# Patient Record
Sex: MALE | Race: WHITE | NOT HISPANIC OR LATINO | Employment: FULL TIME | URBAN - NONMETROPOLITAN AREA
[De-identification: names, ages, dates, MRNs, and addresses within clinical notes are randomized per-mention and may not be internally consistent; named-entity substitution may affect disease eponyms.]

---

## 2023-04-20 ENCOUNTER — OFFICE VISIT (OUTPATIENT)
Dept: PRIMARY CARE | Facility: CLINIC | Age: 63
End: 2023-04-20
Payer: COMMERCIAL

## 2023-04-20 VITALS
SYSTOLIC BLOOD PRESSURE: 116 MMHG | TEMPERATURE: 97.2 F | DIASTOLIC BLOOD PRESSURE: 64 MMHG | HEIGHT: 69 IN | BODY MASS INDEX: 21.62 KG/M2 | OXYGEN SATURATION: 99 % | RESPIRATION RATE: 18 BRPM | WEIGHT: 146 LBS | HEART RATE: 89 BPM

## 2023-04-20 DIAGNOSIS — I73.9 PAD (PERIPHERAL ARTERY DISEASE) (CMS-HCC): ICD-10-CM

## 2023-04-20 DIAGNOSIS — I25.10 CORONARY ARTERY DISEASE INVOLVING NATIVE HEART, UNSPECIFIED VESSEL OR LESION TYPE, UNSPECIFIED WHETHER ANGINA PRESENT: ICD-10-CM

## 2023-04-20 DIAGNOSIS — J44.9 CHRONIC OBSTRUCTIVE PULMONARY DISEASE, UNSPECIFIED COPD TYPE (MULTI): Primary | ICD-10-CM

## 2023-04-20 DIAGNOSIS — D50.9 IRON DEFICIENCY ANEMIA, UNSPECIFIED IRON DEFICIENCY ANEMIA TYPE: ICD-10-CM

## 2023-04-20 DIAGNOSIS — I15.9 SECONDARY HYPERTENSION: ICD-10-CM

## 2023-04-20 PROBLEM — I10 HYPERTENSION: Status: ACTIVE | Noted: 2023-04-20

## 2023-04-20 PROCEDURE — 99213 OFFICE O/P EST LOW 20 MIN: CPT | Performed by: NURSE PRACTITIONER

## 2023-04-20 PROCEDURE — 3074F SYST BP LT 130 MM HG: CPT | Performed by: NURSE PRACTITIONER

## 2023-04-20 PROCEDURE — 83550 IRON BINDING TEST: CPT

## 2023-04-20 PROCEDURE — 36415 COLL VENOUS BLD VENIPUNCTURE: CPT | Performed by: NURSE PRACTITIONER

## 2023-04-20 PROCEDURE — 83540 ASSAY OF IRON: CPT

## 2023-04-20 PROCEDURE — 3078F DIAST BP <80 MM HG: CPT | Performed by: NURSE PRACTITIONER

## 2023-04-20 RX ORDER — AMLODIPINE BESYLATE 10 MG/1
10 TABLET ORAL DAILY
Qty: 30 TABLET | Refills: 1 | Status: SHIPPED | OUTPATIENT
Start: 2023-04-20 | End: 2023-06-19 | Stop reason: SDUPTHER

## 2023-04-20 RX ORDER — CLOPIDOGREL BISULFATE 75 MG/1
75 TABLET ORAL DAILY
COMMUNITY
End: 2023-08-25 | Stop reason: ALTCHOICE

## 2023-04-20 RX ORDER — AMLODIPINE BESYLATE 10 MG/1
10 TABLET ORAL DAILY
COMMUNITY
End: 2023-04-20 | Stop reason: SDUPTHER

## 2023-04-20 RX ORDER — FLUTICASONE PROPIONATE AND SALMETEROL 250; 50 UG/1; UG/1
1 POWDER RESPIRATORY (INHALATION)
Qty: 1 EACH | Refills: 1 | Status: SHIPPED | OUTPATIENT
Start: 2023-04-20 | End: 2023-06-19 | Stop reason: SDUPTHER

## 2023-04-20 RX ORDER — ATORVASTATIN CALCIUM 40 MG/1
40 TABLET, FILM COATED ORAL DAILY
Qty: 30 TABLET | Refills: 1 | Status: SHIPPED | OUTPATIENT
Start: 2023-04-20 | End: 2023-06-19 | Stop reason: SDUPTHER

## 2023-04-20 RX ORDER — ATORVASTATIN CALCIUM 40 MG/1
40 TABLET, FILM COATED ORAL DAILY
COMMUNITY
End: 2023-04-20 | Stop reason: SDUPTHER

## 2023-04-20 RX ORDER — MULTIVITAMIN/IRON/FOLIC ACID 18MG-0.4MG
1 TABLET ORAL DAILY
COMMUNITY

## 2023-04-20 RX ORDER — FLUTICASONE PROPIONATE AND SALMETEROL 250; 50 UG/1; UG/1
POWDER RESPIRATORY (INHALATION)
COMMUNITY
Start: 2021-02-04 | End: 2023-04-20 | Stop reason: SDUPTHER

## 2023-04-20 RX ORDER — LISINOPRIL AND HYDROCHLOROTHIAZIDE 12.5; 2 MG/1; MG/1
1 TABLET ORAL DAILY
Qty: 30 TABLET | Refills: 1 | Status: SHIPPED | OUTPATIENT
Start: 2023-04-20 | End: 2023-06-19 | Stop reason: SDUPTHER

## 2023-04-20 RX ORDER — LISINOPRIL AND HYDROCHLOROTHIAZIDE 12.5; 2 MG/1; MG/1
1 TABLET ORAL DAILY
COMMUNITY
End: 2023-04-20 | Stop reason: SDUPTHER

## 2023-04-20 RX ORDER — ALBUTEROL SULFATE 90 UG/1
1 AEROSOL, METERED RESPIRATORY (INHALATION) EVERY 4 HOURS PRN
COMMUNITY
End: 2023-06-19 | Stop reason: SDUPTHER

## 2023-04-20 NOTE — PROGRESS NOTES
Subjective   Iam Acevedo is a 63 y.o. male who presents for Follow-up (6month ).  Sees Vascular q 6 mos sched next month. Still on Plavix  HTN- on multiple meds, no recent adjustments  COPD- on albuterol with rare use. And Advair- need refill. NO exacerbations or recent illness. At baseline  PAD- on plavix. Bruises easily  Nicotene-  less than 1 PPD x 50 years. Trying to quit  ETOH-  approx 1 case beer.   Exercise- none. Work FT. Very active- lifting, painting            The ROS have been reviewed otherwise negative except for what is in the HPI    Objective   Physical Exam  Constitutional:       Appearance: He is obese.   Cardiovascular:      Rate and Rhythm: Normal rate and regular rhythm.   Pulmonary:      Effort: Pulmonary effort is normal.      Breath sounds: Normal breath sounds.   Abdominal:      General: Bowel sounds are normal.      Palpations: Abdomen is soft.   Musculoskeletal:         General: Normal range of motion.   Neurological:      Mental Status: He is alert and oriented to person, place, and time.   Psychiatric:         Mood and Affect: Mood normal.         Behavior: Behavior normal.       Visit Vitals  /64   Pulse 89   Temp 36.2 °C (97.2 °F)   Resp 18        Assessment/Plan   Problem List Items Addressed This Visit    None    Iam was seen today for follow-up.  Diagnoses and all orders for this visit:  Chronic obstructive pulmonary disease, unspecified COPD type (CMS/HCC) (Primary)  Comments:  stable. refill inhalers  Orders:  -     fluticasone propion-salmeteroL (Advair Diskus) 250-50 mcg/dose diskus inhaler; Inhale 1 puff  in the morning and 1 puff in the evening.  Coronary artery disease involving native heart, unspecified vessel or lesion type, unspecified whether angina present  Comments:  stable on meds  Orders:  -     atorvastatin (Lipitor) 40 mg tablet; Take 1 tablet (40 mg) by mouth once daily.  Secondary hypertension  Comments:  very well controlled on current regimen.  No chnage. refills sent  Orders:  -     amLODIPine (Norvasc) 10 mg tablet; Take 1 tablet (10 mg) by mouth once daily. as directed  -     lisinopriL-hydrochlorothiazide 20-12.5 mg tablet; Take 1 tablet by mouth once daily.  PAD (peripheral artery disease) (CMS/HCC)  Comments:  managed by cv. on AC  Orders:  -     atorvastatin (Lipitor) 40 mg tablet; Take 1 tablet (40 mg) by mouth once daily.  Iron deficiency anemia, unspecified iron deficiency anemia type  Comments:  on supplement. update levels  Orders:  -     Iron and TIBC

## 2023-04-21 LAB
IRON (UG/DL) IN SER/PLAS: 57 UG/DL (ref 35–150)
IRON BINDING CAPACITY (UG/DL) IN SER/PLAS: 286 UG/DL (ref 240–445)
IRON SATURATION (%) IN SER/PLAS: 20 % (ref 25–45)

## 2023-06-19 DIAGNOSIS — I15.9 SECONDARY HYPERTENSION: ICD-10-CM

## 2023-06-19 DIAGNOSIS — I73.9 PAD (PERIPHERAL ARTERY DISEASE) (CMS-HCC): ICD-10-CM

## 2023-06-19 DIAGNOSIS — J44.9 CHRONIC OBSTRUCTIVE PULMONARY DISEASE, UNSPECIFIED COPD TYPE (MULTI): ICD-10-CM

## 2023-06-19 DIAGNOSIS — I25.10 CORONARY ARTERY DISEASE INVOLVING NATIVE HEART, UNSPECIFIED VESSEL OR LESION TYPE, UNSPECIFIED WHETHER ANGINA PRESENT: ICD-10-CM

## 2023-06-19 RX ORDER — AMLODIPINE BESYLATE 10 MG/1
10 TABLET ORAL DAILY
Qty: 30 TABLET | Refills: 1 | Status: SHIPPED | OUTPATIENT
Start: 2023-06-19 | End: 2023-08-25 | Stop reason: SDUPTHER

## 2023-06-19 RX ORDER — ATORVASTATIN CALCIUM 40 MG/1
40 TABLET, FILM COATED ORAL DAILY
Qty: 30 TABLET | Refills: 1 | Status: SHIPPED | OUTPATIENT
Start: 2023-06-19 | End: 2023-08-25 | Stop reason: SDUPTHER

## 2023-06-19 RX ORDER — FLUTICASONE PROPIONATE AND SALMETEROL 250; 50 UG/1; UG/1
1 POWDER RESPIRATORY (INHALATION)
Qty: 1 EACH | Refills: 1 | Status: SHIPPED | OUTPATIENT
Start: 2023-06-19 | End: 2023-08-25 | Stop reason: SDUPTHER

## 2023-06-19 RX ORDER — LISINOPRIL AND HYDROCHLOROTHIAZIDE 12.5; 2 MG/1; MG/1
1 TABLET ORAL DAILY
Qty: 30 TABLET | Refills: 1 | Status: SHIPPED | OUTPATIENT
Start: 2023-06-19 | End: 2023-08-25 | Stop reason: SDUPTHER

## 2023-06-19 RX ORDER — ALBUTEROL SULFATE 90 UG/1
1 AEROSOL, METERED RESPIRATORY (INHALATION) EVERY 4 HOURS PRN
Qty: 18 G | Refills: 1 | Status: SHIPPED | OUTPATIENT
Start: 2023-06-19 | End: 2024-03-27 | Stop reason: SDUPTHER

## 2023-06-19 NOTE — TELEPHONE ENCOUNTER
Recent PHQ 2/9 Score - 0    PHQ 2:  Date Adult PHQ 2 Score Adult PHQ 2 Interpretation   2/2/2022 0 No further screening needed       PHQ 9:     Most Recent CAM[place] 7 Score - 0             SABINA sent

## 2023-08-25 ENCOUNTER — OFFICE VISIT (OUTPATIENT)
Dept: PRIMARY CARE | Facility: CLINIC | Age: 63
End: 2023-08-25
Payer: COMMERCIAL

## 2023-08-25 VITALS
TEMPERATURE: 97.3 F | WEIGHT: 143 LBS | SYSTOLIC BLOOD PRESSURE: 120 MMHG | HEIGHT: 67 IN | DIASTOLIC BLOOD PRESSURE: 80 MMHG | HEART RATE: 96 BPM | BODY MASS INDEX: 22.44 KG/M2 | OXYGEN SATURATION: 98 %

## 2023-08-25 DIAGNOSIS — I73.9 PAD (PERIPHERAL ARTERY DISEASE) (CMS-HCC): ICD-10-CM

## 2023-08-25 DIAGNOSIS — I25.10 CORONARY ARTERY DISEASE INVOLVING NATIVE HEART, UNSPECIFIED VESSEL OR LESION TYPE, UNSPECIFIED WHETHER ANGINA PRESENT: ICD-10-CM

## 2023-08-25 DIAGNOSIS — I15.9 SECONDARY HYPERTENSION: ICD-10-CM

## 2023-08-25 DIAGNOSIS — J44.9 CHRONIC OBSTRUCTIVE PULMONARY DISEASE, UNSPECIFIED COPD TYPE (MULTI): ICD-10-CM

## 2023-08-25 PROCEDURE — 3079F DIAST BP 80-89 MM HG: CPT

## 2023-08-25 PROCEDURE — 3074F SYST BP LT 130 MM HG: CPT

## 2023-08-25 PROCEDURE — 99396 PREV VISIT EST AGE 40-64: CPT

## 2023-08-25 RX ORDER — LISINOPRIL AND HYDROCHLOROTHIAZIDE 12.5; 2 MG/1; MG/1
1 TABLET ORAL DAILY
Qty: 90 TABLET | Refills: 1 | Status: SHIPPED | OUTPATIENT
Start: 2023-08-25 | End: 2024-03-27 | Stop reason: SDUPTHER

## 2023-08-25 RX ORDER — AMLODIPINE BESYLATE 10 MG/1
10 TABLET ORAL DAILY
Qty: 90 TABLET | Refills: 1 | Status: SHIPPED | OUTPATIENT
Start: 2023-08-25 | End: 2024-03-27 | Stop reason: SDUPTHER

## 2023-08-25 RX ORDER — FERROUS SULFATE 325(65) MG
65 TABLET, DELAYED RELEASE (ENTERIC COATED) ORAL
COMMUNITY

## 2023-08-25 RX ORDER — ATORVASTATIN CALCIUM 40 MG/1
40 TABLET, FILM COATED ORAL DAILY
Qty: 90 TABLET | Refills: 1 | Status: SHIPPED | OUTPATIENT
Start: 2023-08-25 | End: 2024-03-27 | Stop reason: SDUPTHER

## 2023-08-25 RX ORDER — FLUTICASONE PROPIONATE AND SALMETEROL 250; 50 UG/1; UG/1
1 POWDER RESPIRATORY (INHALATION)
Qty: 90 EACH | Refills: 1 | Status: SHIPPED | OUTPATIENT
Start: 2023-08-25 | End: 2023-11-23

## 2023-08-25 ASSESSMENT — ENCOUNTER SYMPTOMS
UNEXPECTED WEIGHT CHANGE: 0
ABDOMINAL PAIN: 0
DEPRESSION: 0
ALLERGIC/IMMUNOLOGIC NEGATIVE: 1
FEVER: 0
MUSCULOSKELETAL NEGATIVE: 1
LOSS OF SENSATION IN FEET: 0
ACTIVITY CHANGE: 0
CARDIOVASCULAR NEGATIVE: 1
ENDOCRINE NEGATIVE: 1
SHORTNESS OF BREATH: 0
FATIGUE: 0
WEAKNESS: 0
RESPIRATORY NEGATIVE: 1
DIZZINESS: 0
GASTROINTESTINAL NEGATIVE: 1
OCCASIONAL FEELINGS OF UNSTEADINESS: 0
HEADACHES: 0
PSYCHIATRIC NEGATIVE: 1

## 2023-08-25 ASSESSMENT — PATIENT HEALTH QUESTIONNAIRE - PHQ9
1. LITTLE INTEREST OR PLEASURE IN DOING THINGS: NOT AT ALL
SUM OF ALL RESPONSES TO PHQ9 QUESTIONS 1 & 2: 0
2. FEELING DOWN, DEPRESSED OR HOPELESS: NOT AT ALL

## 2023-08-25 NOTE — PROGRESS NOTES
Subjective   Patient ID: Iam Acevedo is a 63 y.o. male who presents for Establish Care (Iam is being seen today to establish care. Needs refills. ).  Stent placed last fall - PAD    Seeing vasc surg once yearly  Now off plavix since May    He does drink alcohol, admits to about a case per week (30 pack)  Chronic anemia, appears to be macrocytic. - needs labs in October, recheck vitamins B and CBC as well as Folate.     COPD good control, Advair twice daily, rarely using albuterol.         Vitals:    08/25/23 1519   BP: 120/80   Pulse: 96   Temp: 36.3 °C (97.3 °F)   SpO2: 98%       Review of Systems   Constitutional:  Negative for activity change, fatigue, fever and unexpected weight change.   HENT: Negative.     Respiratory: Negative.  Negative for shortness of breath.    Cardiovascular: Negative.  Negative for chest pain.   Gastrointestinal: Negative.  Negative for abdominal pain.   Endocrine: Negative.    Musculoskeletal: Negative.    Skin: Negative.    Allergic/Immunologic: Negative.    Neurological:  Negative for dizziness, weakness and headaches.   Psychiatric/Behavioral: Negative.         Objective   Physical Exam  Vitals and nursing note reviewed.   Constitutional:       Appearance: Normal appearance.   HENT:      Head: Normocephalic.      Mouth/Throat:      Mouth: Mucous membranes are moist.   Cardiovascular:      Rate and Rhythm: Normal rate and regular rhythm.      Pulses: Normal pulses.      Heart sounds: Normal heart sounds. No murmur heard.     No friction rub. No gallop.   Pulmonary:      Effort: Pulmonary effort is normal. No respiratory distress.      Breath sounds: Normal breath sounds. No wheezing.   Abdominal:      General: Bowel sounds are normal. There is no distension.      Palpations: Abdomen is soft.      Tenderness: There is no abdominal tenderness.   Musculoskeletal:         General: No deformity. Normal range of motion.   Skin:     General: Skin is warm and dry.      Capillary  Refill: Capillary refill takes less than 2 seconds.   Neurological:      General: No focal deficit present.      Mental Status: He is alert and oriented to person, place, and time.   Psychiatric:         Mood and Affect: Mood normal.         Assessment/Plan   Problem List Items Addressed This Visit       CAD (coronary artery disease)    Relevant Medications    amLODIPine (Norvasc) 10 mg tablet    atorvastatin (Lipitor) 40 mg tablet    Other Relevant Orders    Vitamin B12    CBC and Auto Differential    Comprehensive Metabolic Panel    Lipid Panel    Vitamin D 25-Hydroxy,Total    Iron and TIBC    Ferritin    Folate    COPD (chronic obstructive pulmonary disease) (CMS/[facility])    Relevant Medications    fluticasone propion-salmeteroL (Advair Diskus) 250-50 mcg/dose diskus inhaler    Hypertension    Relevant Medications    amLODIPine (Norvasc) 10 mg tablet    lisinopriL-hydrochlorothiazide 20-12.5 mg tablet    PAD (peripheral artery disease) (CMS/[facility])    Relevant Medications    atorvastatin (Lipitor) 40 mg tablet            Thank you for coming in today, please call my office if you have any concerns or questions.     Ángel BONNER, CNP

## 2023-08-25 NOTE — PATIENT INSTRUCTIONS
Refills sent  Labs in 2 months with JEROME Robin    Thank you for coming in today, if any questions or concerns arise, please call my office.   Ángel Mccarty, KAILEY-CNP

## 2023-10-23 ENCOUNTER — APPOINTMENT (OUTPATIENT)
Dept: PRIMARY CARE | Facility: CLINIC | Age: 63
End: 2023-10-23
Payer: COMMERCIAL

## 2023-10-27 ENCOUNTER — CLINICAL SUPPORT (OUTPATIENT)
Dept: PRIMARY CARE | Facility: CLINIC | Age: 63
End: 2023-10-27
Payer: COMMERCIAL

## 2023-10-27 DIAGNOSIS — I25.10 CORONARY ARTERY DISEASE INVOLVING NATIVE HEART, UNSPECIFIED VESSEL OR LESION TYPE, UNSPECIFIED WHETHER ANGINA PRESENT: ICD-10-CM

## 2023-10-27 PROCEDURE — 82306 VITAMIN D 25 HYDROXY: CPT

## 2023-10-27 PROCEDURE — 83550 IRON BINDING TEST: CPT

## 2023-10-27 PROCEDURE — 80061 LIPID PANEL: CPT

## 2023-10-27 PROCEDURE — 36415 COLL VENOUS BLD VENIPUNCTURE: CPT

## 2023-10-27 PROCEDURE — 80053 COMPREHEN METABOLIC PANEL: CPT

## 2023-10-27 PROCEDURE — 82728 ASSAY OF FERRITIN: CPT

## 2023-10-27 PROCEDURE — 82746 ASSAY OF FOLIC ACID SERUM: CPT

## 2023-10-27 PROCEDURE — 82607 VITAMIN B-12: CPT

## 2023-10-27 PROCEDURE — 83540 ASSAY OF IRON: CPT

## 2023-10-27 PROCEDURE — 85025 COMPLETE CBC W/AUTO DIFF WBC: CPT

## 2023-10-28 LAB
25(OH)D3 SERPL-MCNC: 25 NG/ML (ref 30–100)
ALBUMIN SERPL BCP-MCNC: 4.2 G/DL (ref 3.4–5)
ALP SERPL-CCNC: 72 U/L (ref 33–136)
ALT SERPL W P-5'-P-CCNC: 22 U/L (ref 10–52)
ANION GAP SERPL CALC-SCNC: 19 MMOL/L (ref 10–20)
AST SERPL W P-5'-P-CCNC: 20 U/L (ref 9–39)
BASOPHILS # BLD AUTO: 0.03 X10*3/UL (ref 0–0.1)
BASOPHILS NFR BLD AUTO: 0.5 %
BILIRUB SERPL-MCNC: 0.8 MG/DL (ref 0–1.2)
BUN SERPL-MCNC: 16 MG/DL (ref 6–23)
CALCIUM SERPL-MCNC: 9.5 MG/DL (ref 8.6–10.6)
CHLORIDE SERPL-SCNC: 100 MMOL/L (ref 98–107)
CHOLEST SERPL-MCNC: 141 MG/DL (ref 0–199)
CHOLESTEROL/HDL RATIO: 1.6
CO2 SERPL-SCNC: 23 MMOL/L (ref 21–32)
CREAT SERPL-MCNC: 0.7 MG/DL (ref 0.5–1.3)
EOSINOPHIL # BLD AUTO: 0.12 X10*3/UL (ref 0–0.7)
EOSINOPHIL NFR BLD AUTO: 1.9 %
ERYTHROCYTE [DISTWIDTH] IN BLOOD BY AUTOMATED COUNT: 13 % (ref 11.5–14.5)
FERRITIN SERPL-MCNC: 103 NG/ML (ref 20–300)
FOLATE SERPL-MCNC: 15.6 NG/ML
GFR SERPL CREATININE-BSD FRML MDRD: >90 ML/MIN/1.73M*2
GLUCOSE SERPL-MCNC: 64 MG/DL (ref 74–99)
HCT VFR BLD AUTO: 45.1 % (ref 41–52)
HDLC SERPL-MCNC: 86.8 MG/DL
HGB BLD-MCNC: 14.5 G/DL (ref 13.5–17.5)
IMM GRANULOCYTES # BLD AUTO: 0.01 X10*3/UL (ref 0–0.7)
IMM GRANULOCYTES NFR BLD AUTO: 0.2 % (ref 0–0.9)
IRON SATN MFR SERPL: 65 % (ref 25–45)
IRON SERPL-MCNC: 191 UG/DL (ref 35–150)
LDLC SERPL CALC-MCNC: 44 MG/DL
LYMPHOCYTES # BLD AUTO: 1.73 X10*3/UL (ref 1.2–4.8)
LYMPHOCYTES NFR BLD AUTO: 27.2 %
MCH RBC QN AUTO: 33.6 PG (ref 26–34)
MCHC RBC AUTO-ENTMCNC: 32.2 G/DL (ref 32–36)
MCV RBC AUTO: 104 FL (ref 80–100)
MONOCYTES # BLD AUTO: 0.59 X10*3/UL (ref 0.1–1)
MONOCYTES NFR BLD AUTO: 9.3 %
NEUTROPHILS # BLD AUTO: 3.89 X10*3/UL (ref 1.2–7.7)
NEUTROPHILS NFR BLD AUTO: 60.9 %
NON HDL CHOLESTEROL: 54 MG/DL (ref 0–149)
NRBC BLD-RTO: 0 /100 WBCS (ref 0–0)
PLATELET # BLD AUTO: 307 X10*3/UL (ref 150–450)
PMV BLD AUTO: 9.4 FL (ref 7.5–11.5)
POTASSIUM SERPL-SCNC: 4.8 MMOL/L (ref 3.5–5.3)
PROT SERPL-MCNC: 6.6 G/DL (ref 6.4–8.2)
RBC # BLD AUTO: 4.32 X10*6/UL (ref 4.5–5.9)
SODIUM SERPL-SCNC: 137 MMOL/L (ref 136–145)
TIBC SERPL-MCNC: 296 UG/DL (ref 240–445)
TRIGL SERPL-MCNC: 50 MG/DL (ref 0–149)
UIBC SERPL-MCNC: 105 UG/DL (ref 110–370)
VIT B12 SERPL-MCNC: 368 PG/ML (ref 211–911)
VLDL: 10 MG/DL (ref 0–40)
WBC # BLD AUTO: 6.4 X10*3/UL (ref 4.4–11.3)

## 2023-10-30 ENCOUNTER — TELEPHONE (OUTPATIENT)
Dept: PRIMARY CARE | Facility: CLINIC | Age: 63
End: 2023-10-30
Payer: COMMERCIAL

## 2023-10-30 DIAGNOSIS — E55.9 VITAMIN D DEFICIENCY: Primary | ICD-10-CM

## 2023-10-30 RX ORDER — ERGOCALCIFEROL 1.25 MG/1
50000 CAPSULE ORAL
Qty: 12 CAPSULE | Refills: 0 | Status: SHIPPED | OUTPATIENT
Start: 2023-10-30 | End: 2024-01-22

## 2023-10-30 NOTE — TELEPHONE ENCOUNTER
----- Message from KAILEY Neville-CNP sent at 10/30/2023  1:01 PM EDT -----  Vitamin D is low, started supplement, sent to his pharmacy

## 2023-10-31 NOTE — TELEPHONE ENCOUNTER
Annabella left a message stating they did [facility] Will  medications.

## 2024-03-27 DIAGNOSIS — J44.9 CHRONIC OBSTRUCTIVE PULMONARY DISEASE, UNSPECIFIED COPD TYPE (MULTI): ICD-10-CM

## 2024-03-27 DIAGNOSIS — I15.9 SECONDARY HYPERTENSION: ICD-10-CM

## 2024-03-27 DIAGNOSIS — I73.9 PAD (PERIPHERAL ARTERY DISEASE) (CMS-HCC): ICD-10-CM

## 2024-03-27 DIAGNOSIS — I25.10 CORONARY ARTERY DISEASE INVOLVING NATIVE HEART, UNSPECIFIED VESSEL OR LESION TYPE, UNSPECIFIED WHETHER ANGINA PRESENT: ICD-10-CM

## 2024-03-28 RX ORDER — LISINOPRIL AND HYDROCHLOROTHIAZIDE 12.5; 2 MG/1; MG/1
1 TABLET ORAL DAILY
Qty: 90 TABLET | Refills: 1 | Status: SHIPPED | OUTPATIENT
Start: 2024-03-28 | End: 2024-09-24

## 2024-03-28 RX ORDER — AMLODIPINE BESYLATE 10 MG/1
10 TABLET ORAL DAILY
Qty: 90 TABLET | Refills: 1 | Status: SHIPPED | OUTPATIENT
Start: 2024-03-28

## 2024-03-28 RX ORDER — ALBUTEROL SULFATE 90 UG/1
1 AEROSOL, METERED RESPIRATORY (INHALATION) EVERY 4 HOURS PRN
Qty: 18 G | Refills: 1 | Status: SHIPPED | OUTPATIENT
Start: 2024-03-28

## 2024-03-28 RX ORDER — ATORVASTATIN CALCIUM 40 MG/1
40 TABLET, FILM COATED ORAL DAILY
Qty: 90 TABLET | Refills: 1 | Status: SHIPPED | OUTPATIENT
Start: 2024-03-28 | End: 2024-09-24

## 2024-04-01 ENCOUNTER — APPOINTMENT (OUTPATIENT)
Dept: PRIMARY CARE | Facility: CLINIC | Age: 64
End: 2024-04-01
Payer: COMMERCIAL

## 2024-05-17 ENCOUNTER — APPOINTMENT (OUTPATIENT)
Dept: VASCULAR SURGERY | Facility: HOSPITAL | Age: 64
End: 2024-05-17
Payer: COMMERCIAL

## 2024-05-17 ENCOUNTER — APPOINTMENT (OUTPATIENT)
Dept: VASCULAR MEDICINE | Facility: HOSPITAL | Age: 64
End: 2024-05-17
Payer: COMMERCIAL

## 2024-09-16 ENCOUNTER — APPOINTMENT (OUTPATIENT)
Dept: RADIOLOGY | Facility: HOSPITAL | Age: 64
End: 2024-09-16
Payer: COMMERCIAL

## 2024-09-16 ENCOUNTER — HOSPITAL ENCOUNTER (EMERGENCY)
Facility: HOSPITAL | Age: 64
Discharge: HOME | End: 2024-09-16
Attending: FAMILY MEDICINE
Payer: COMMERCIAL

## 2024-09-16 VITALS
WEIGHT: 145 LBS | TEMPERATURE: 98.4 F | SYSTOLIC BLOOD PRESSURE: 116 MMHG | HEIGHT: 68 IN | OXYGEN SATURATION: 100 % | RESPIRATION RATE: 16 BRPM | HEART RATE: 89 BPM | BODY MASS INDEX: 21.98 KG/M2 | DIASTOLIC BLOOD PRESSURE: 84 MMHG

## 2024-09-16 DIAGNOSIS — M19.032 PRIMARY OSTEOARTHRITIS OF LEFT WRIST: ICD-10-CM

## 2024-09-16 DIAGNOSIS — S63.502A SPRAIN OF LEFT WRIST, INITIAL ENCOUNTER: Primary | ICD-10-CM

## 2024-09-16 DIAGNOSIS — M79.645 THUMB PAIN, LEFT: ICD-10-CM

## 2024-09-16 PROCEDURE — 73110 X-RAY EXAM OF WRIST: CPT | Mod: LT

## 2024-09-16 PROCEDURE — 73110 X-RAY EXAM OF WRIST: CPT | Mod: LEFT SIDE | Performed by: RADIOLOGY

## 2024-09-16 PROCEDURE — 99284 EMERGENCY DEPT VISIT MOD MDM: CPT | Mod: 25

## 2024-09-16 PROCEDURE — 73130 X-RAY EXAM OF HAND: CPT | Mod: LEFT SIDE | Performed by: RADIOLOGY

## 2024-09-16 PROCEDURE — 73130 X-RAY EXAM OF HAND: CPT | Mod: LT

## 2024-09-16 ASSESSMENT — PAIN DESCRIPTION - PAIN TYPE: TYPE: ACUTE PAIN

## 2024-09-16 ASSESSMENT — COLUMBIA-SUICIDE SEVERITY RATING SCALE - C-SSRS
6. HAVE YOU EVER DONE ANYTHING, STARTED TO DO ANYTHING, OR PREPARED TO DO ANYTHING TO END YOUR LIFE?: NO
1. IN THE PAST MONTH, HAVE YOU WISHED YOU WERE DEAD OR WISHED YOU COULD GO TO SLEEP AND NOT WAKE UP?: NO
2. HAVE YOU ACTUALLY HAD ANY THOUGHTS OF KILLING YOURSELF?: NO

## 2024-09-16 ASSESSMENT — PAIN DESCRIPTION - LOCATION: LOCATION: WRIST

## 2024-09-16 ASSESSMENT — PAIN DESCRIPTION - ORIENTATION: ORIENTATION: LEFT

## 2024-09-16 ASSESSMENT — PAIN SCALES - GENERAL: PAINLEVEL_OUTOF10: 4

## 2024-09-16 ASSESSMENT — PAIN - FUNCTIONAL ASSESSMENT: PAIN_FUNCTIONAL_ASSESSMENT: 0-10

## 2024-09-16 NOTE — DISCHARGE INSTRUCTIONS
Tylenol or Motrin for pain and gentle cool compress affected area.  X-ray showed no fracture or dislocation there is some arthritis as well as mechanical sprain to the left wrist causing pain which should improve with the time.  You been referred to your company physician as well as orthopedic service.  If symptoms persist you will need to see a hand specialist.  If worsening symptoms return to ER

## 2024-09-16 NOTE — Clinical Note
Iam Acevedo was seen and treated in our emergency department on 9/16/2024.  He may return to work on 09/16/2024.  Limited use of left hand no more than 5 pound lifting no pushing or pulling with the left hand.  No repetitive type activity left hand     If you have any questions or concerns, please don't hesitate to call.      Juli Babcock MD

## 2024-09-16 NOTE — ED PROVIDER NOTES
HPI   Chief Complaint   Patient presents with    Wrist Injury     Pt presents with c/o pain in L wrist, states he injured it last Friday and it is not feeling better       Left hand and wrist x-ray showed no fracture or dislocation patient however has pain at the base of the        This is a 64-year-old male patient who is left-hand dominant came to the emergency room with a complaint of left wrist and left thumb pain pain is worse when he tried to move his thumb pain is mostly at the base of the first metacarpal and close to the second metacarpal.  Symptoms started when he was using a spray machine which apparently swung around and hit in the dorsum of the left hand at the base of the first metacarpal and side of the second metacarpal area causing pain mostly in the base of the first metacarpal and adjacent area admitted tried to move his arm he cannot fully flex his thumb due to pain in the base of the first metacarpal.  He denies injury to other part of the hand or any other part of body.  Denies any numbness or tingling finger.  He denies any prior history of wrist problems or any surgeries in the past.  He denies any other symptoms.  Denies any fever chills cough chest pain short of breath nausea vomiting or diarrhea.  Denies history of peptic ulcers or renal problem.  Injury occurred at work.    Family history: Reviewed  Social history: Reviewed, denies substance abuse.  Review of system: 10 review of system obtained review of system described in HPI otherwise negative.  Patient History   Past Medical History:   Diagnosis Date    Atherosclerosis of native arteries of extremities with intermittent claudication, left leg (CMS-[facility]) 03/21/2022    Atherosclerosis of native arteries of extremities with intermittent claudication, left leg    Encounter for immunization 09/22/2016    Flu vaccine need    Other acute postprocedural pain 02/08/2021    Acute post-operative pain    Personal history of nicotine dependence  03/22/2022    History of tobacco abuse    Personal history of nicotine dependence 11/08/2018    History of tobacco abuse    Personal history of other diseases of the digestive system 03/11/2021    History of right inguinal hernia    Pneumonia, unspecified organism     Atypical pneumonia    Solitary pulmonary nodule 04/30/2015    Lung nodule    Unspecified abdominal hernia without obstruction or gangrene     Hernia     Past Surgical History:   Procedure Laterality Date    HERNIA REPAIR  03/11/2021    Hernia Repair    OTHER SURGICAL HISTORY  10/05/2020    Vascular surgical procedure     No family history on file.  Social History     Tobacco Use    Smoking status: Every Day     Current packs/day: 1.00     Types: Cigarettes    Smokeless tobacco: Never   Vaping Use    Vaping status: Never Used   Substance Use Topics    Alcohol use: Yes     Alcohol/week: 24.0 standard drinks of alcohol     Types: 24 Cans of beer per week     Comment: weekly    Drug use: Not Currently       Physical Exam   ED Triage Vitals [09/16/24 0720]   Temperature Heart Rate Respirations BP   36.9 °C (98.4 °F) (!) 101 18 126/81      Pulse Ox Temp Source Heart Rate Source Patient Position   99 % Temporal -- --      BP Location FiO2 (%)     -- --       Physical Exam  Constitutional:       Appearance: Normal appearance.      Comments: Patient well-developed well-nourished without any obvious acute distress talking breathing comfortably.  Alert oriented x 3.  Patient was noted to have a mild edema of the first metacarpal left hand and tenderness to the base of the first and second metacarpal of the left hand, when he tried to move his thumb he could not fully flex thumb due to pain but he could move his thumb pain is most of the MP joint area.  Good capillary refill at tip of finger rest of the finger good motion nontender wrist anatomic snuffbox nontender good motion shoulder with discharge bilaterally.  Right hand without any evidence of discomfort or  tenderness.  Rest of MSK examination is normal including motion shoulder elbow wrist hip knee and ankle joint spine nontender neck is supple calf ulcer nontender Homans' sign negative.  No facial bruise edema or erythema.  Throat is clear intact lungs are clear.  Heart with regular rate and rhythm is auscultated abdomen soft nontender no guarding rebound rigidity.  Calf ulcer nontender Homans' sign negative.  Patient walking with steady gait.     HENT:      Head: Normocephalic and atraumatic.      Right Ear: External ear normal.      Left Ear: External ear normal.      Nose: Nose normal. No congestion or rhinorrhea.      Mouth/Throat:      Mouth: Mucous membranes are moist.      Comments: Patient has some missing teeth, but no gum edema Shilpi no facial edema or erythema.  Eyes:      Extraocular Movements: Extraocular movements intact.      Conjunctiva/sclera: Conjunctivae normal.      Pupils: Pupils are equal, round, and reactive to light.   Cardiovascular:      Rate and Rhythm: Normal rate and regular rhythm.      Pulses: Normal pulses.      Heart sounds: Normal heart sounds.   Pulmonary:      Effort: Pulmonary effort is normal.      Breath sounds: Normal breath sounds.   Abdominal:      General: Abdomen is flat. Bowel sounds are normal.      Palpations: Abdomen is soft.   Musculoskeletal:         General: Normal range of motion.      Cervical back: Normal range of motion and neck supple.      Comments: Patient well-developed well-nourished without any obvious acute distress talking breathing comfortably.  Alert oriented x 3.  Patient was noted to have a mild edema of the first metacarpal left hand and tenderness to the base of the first and second metacarpal of the left hand, when he tried to move his thumb he could not fully flex thumb due to pain but he could move his thumb pain is most of the MP joint area.  Good capillary refill at tip of finger rest of the finger good motion nontender wrist anatomic snuffbox  nontender good motion shoulder with discharge bilaterally.  Right hand without any evidence of discomfort or tenderness.  Rest of MSK examination is normal including motion shoulder elbow wrist hip knee and ankle joint spine nontender neck is supple calf ulcer nontender Homans' sign negative.   Skin:     General: Skin is warm.      Capillary Refill: Capillary refill takes less than 2 seconds.   Neurological:      General: No focal deficit present.      Mental Status: He is alert and oriented to person, place, and time. Mental status is at baseline.      Cranial Nerves: No cranial nerve deficit.      Sensory: No sensory deficit.      Motor: No weakness.      Coordination: Coordination normal.      Gait: Gait normal.   Psychiatric:         Mood and Affect: Mood normal.         Behavior: Behavior normal.           ED Course & MDM   Diagnoses as of 09/16/24 0837   Sprain of left wrist, initial encounter   Thumb pain, left   Primary osteoarthritis of left wrist   This is a 64-year-old male patient who is left-hand dominant came to the emergency room with a complaint of left wrist and left thumb pain pain is worse when he tried to move his thumb pain is mostly at the base of the first metacarpal and close to the second metacarpal.  Symptoms started when he was using a spray machine which apparently swung around and hit in the dorsum of the left hand at the base of the first metacarpal and side of the second metacarpal area causing pain mostly in the base of the first metacarpal and adjacent area admitted tried to move his arm he cannot fully flex his thumb due to pain in the base of the first metacarpal.  He denies injury to other part of the hand or any other part of body.  Denies any numbness or tingling finger.  He denies any prior history of wrist problems or any surgeries in the past.  He denies any other symptoms.  Denies any fever chills cough chest pain short of breath nausea vomiting or diarrhea.  Denies history  of peptic ulcers or renal problem.  Injury occurred at work.          Patient well-developed well-nourished without any obvious acute distress talking breathing comfortably.  Alert oriented x 3.  Patient was noted to have a mild edema of the first metacarpal left hand and tenderness to the base of the first and second metacarpal of the left hand, when he tried to move his thumb he could not fully flex thumb due to pain but he could move his thumb pain is most of the MP joint area.  Good capillary refill at tip of finger rest of the finger good motion nontender wrist anatomic snuffbox nontender good motion shoulder with discharge bilaterally.  Right hand without any evidence of discomfort or tenderness.  Rest of MSK examination is normal including motion shoulder elbow wrist hip knee and ankle joint spine nontender neck is supple calf ulcer nontender Homans' sign negative.  No facial bruise edema or erythema.  Throat is clear intact lungs are clear.  Heart with regular rate and rhythm is auscultated abdomen soft nontender no guarding rebound rigidity.  Calf ulcer nontender Homans' sign negative.  Patient walking with steady gait.    No data recorded                                 Medical Decision Making  Patient was found to have a negative x-ray of the left hand and wrist except degenerative joint disease no fracture or dislocation.  Patient clinically tenderness and clinical has some swelling and pain in the base of the thumb as result patient was fitted with thumb spica splint advised ibuprofen pain and ache and limited use of left hand work restriction as described 5 pound weight lifting limit no repetitive type activities refer to complete physician as well as orthopedic surgeon specializing in hand and wrist.  If any problem concern return to ER discharged home in stable condition all questions answered.  Understood instructions well  Procedure  Procedures     Juli Babcock MD  09/16/24 [id number]       Juli  MD Sadiq  09/16/24 0838

## 2024-10-01 ENCOUNTER — OFFICE VISIT (OUTPATIENT)
Dept: ORTHOPEDIC SURGERY | Facility: CLINIC | Age: 64
End: 2024-10-01
Payer: COMMERCIAL

## 2024-10-01 DIAGNOSIS — S63.502A SPRAIN OF LEFT WRIST, INITIAL ENCOUNTER: ICD-10-CM

## 2024-10-01 PROCEDURE — L3924 HFO WITHOUT JOINTS PRE OTS: HCPCS | Performed by: ORTHOPAEDIC SURGERY

## 2024-10-01 PROCEDURE — 99203 OFFICE O/P NEW LOW 30 MIN: CPT | Performed by: ORTHOPAEDIC SURGERY

## 2024-10-01 PROCEDURE — 99213 OFFICE O/P EST LOW 20 MIN: CPT | Performed by: ORTHOPAEDIC SURGERY

## 2024-10-01 ASSESSMENT — ENCOUNTER SYMPTOMS
ARTHRALGIAS: 0
WHEEZING: 0
CHILLS: 0
BACK PAIN: 1
NECK PAIN: 1
FATIGUE: 0
SHORTNESS OF BREATH: 0
NECK STIFFNESS: 0
NUMBNESS: 0
FEVER: 0
JOINT SWELLING: 1
BRUISES/BLEEDS EASILY: 1

## 2024-10-01 ASSESSMENT — PAIN SCALES - GENERAL: PAINLEVEL_OUTOF10: 4

## 2024-10-01 ASSESSMENT — PAIN - FUNCTIONAL ASSESSMENT: PAIN_FUNCTIONAL_ASSESSMENT: 0-10

## 2024-10-01 NOTE — PROGRESS NOTES
Reason for Appointment  Chief Complaint   Patient presents with    Left Wrist - Pain     History of Present Illness  New patient is a 64 y.o. male here today for evaluation of right hand and thumb pain.  ER notes and x-rays are reviewed and just show the significant osteoarthritis at the first CMC joint and the index and long MP joints.  ER notes reviewed he had a drill injury where torqued and then came up and hit his dorsum of his right thumb and hand.  He sustained a contusion.  Pain continues but is much better organ to get him to a smaller brace today and we need to place a C9 for the Comfort Cool brace but he is doing well we will let him go back to work full duty and he can just use a lighter brace isolated pain is much better swelling down no significant resultant bruising.  Never has had a significant injury to this area in the past before.     Past Medical History:   Diagnosis Date    Atherosclerosis of native arteries of extremities with intermittent claudication, left leg (CMS-[facility]) 03/21/2022    Atherosclerosis of native arteries of extremities with intermittent claudication, left leg    Encounter for immunization 09/22/2016    Flu vaccine need    Other acute postprocedural pain 02/08/2021    Acute post-operative pain    Personal history of nicotine dependence 03/22/2022    History of tobacco abuse    Personal history of nicotine dependence 11/08/2018    History of tobacco abuse    Personal history of other diseases of the digestive system 03/11/2021    History of right inguinal hernia    Pneumonia, unspecified organism     Atypical pneumonia    Solitary pulmonary nodule 04/30/2015    Lung nodule    Unspecified abdominal hernia without obstruction or gangrene     Hernia       Past Surgical History:   Procedure Laterality Date    HERNIA REPAIR  03/11/2021    Hernia Repair    OTHER SURGICAL HISTORY  10/05/2020    Vascular surgical procedure       Medication Documentation Review Audit       Reviewed by  Benjy Menard MD (Physician) on 10/01/24 at 1221      Medication Order Taking? Sig Documenting Provider Last Dose Status   albuterol 90 mcg/actuation inhaler [id number] Yes Inhale 1 puff every 4 hours if needed for wheezing or shortness of breath. Ángel Mccarty APRKATHERIN-CNP Taking Active   amLODIPine (Norvasc) 10 mg tablet [id number] Yes Take 1 tablet (10 mg) by mouth once daily. as directed Ángel CONNER MccartyJRCNP Taking Active   atorvastatin (Lipitor) 40 mg tablet [id number]  Take 1 tablet (40 mg) by mouth once daily. JAN Neville   24 2359   b complex 0.4 mg tablet [id number] Yes Take 1 tablet by mouth once daily. Historical Provider, MD Taking Active   ferrous sulfate 325 (65 Fe) MG EC tablet [id number] Yes Take 1 tablet (65 mg) by mouth once daily with a meal. Do not crush, chew, or split. Historical Provider, MD Taking Active   fluticasone propion-salmeteroL (Advair Diskus) 250-50 mcg/dose diskus inhaler [id number]  Inhale 1 puff 2 times a day. Ángel CONNER Mccarty APRKATHERINARELIS   23 2359   lisinopriL-hydrochlorothiazide 20-12.5 mg tablet [id number]  Take 1 tablet by mouth once daily. Ángel Mccarty APRKATHERINARELIS   24 2359   multivitamin with minerals (multivitamin-iron-folic acid) tablet [id number] Yes Take 1 tablet by mouth once daily. Historical Provider, MD Taking Active                    No Known Allergies    Review of Systems   Constitutional:  Negative for chills, fatigue and fever.   Respiratory:  Negative for shortness of breath and wheezing.    Cardiovascular:  Negative for chest pain and leg swelling.   Musculoskeletal:  Positive for back pain, joint swelling and neck pain. Negative for arthralgias and neck stiffness.   Skin: Negative.    Allergic/Immunologic: Negative for immunocompromised state.   Neurological:  Negative for numbness.   Hematological:  Bruises/bleeds easily.       Exam   Patient is alert and awake no acute distress mood is good good cervical  shoulder elbow wrist and hand range of motion overall isolated tenderness that is mild over the CMC joint and over the wrist negative Finkelstein's test good tendon function throughout good pulses good sensation  Assessment   Encounter Diagnoses   Name Primary?    Sprain of left wrist, initial encounter     Thumb pain, left     Primary osteoarthritis of left wrist        Plan

## 2024-10-10 DIAGNOSIS — I25.10 CORONARY ARTERY DISEASE INVOLVING NATIVE HEART, UNSPECIFIED VESSEL OR LESION TYPE, UNSPECIFIED WHETHER ANGINA PRESENT: ICD-10-CM

## 2024-10-10 DIAGNOSIS — I73.9 PAD (PERIPHERAL ARTERY DISEASE) (CMS-HCC): ICD-10-CM

## 2024-10-10 DIAGNOSIS — I15.9 SECONDARY HYPERTENSION: ICD-10-CM

## 2024-10-18 RX ORDER — ATORVASTATIN CALCIUM 40 MG/1
40 TABLET, FILM COATED ORAL DAILY
Qty: 90 TABLET | Refills: 1 | OUTPATIENT
Start: 2024-10-18 | End: 2025-04-16

## 2024-10-18 RX ORDER — LISINOPRIL AND HYDROCHLOROTHIAZIDE 12.5; 2 MG/1; MG/1
1 TABLET ORAL DAILY
Qty: 90 TABLET | Refills: 1 | OUTPATIENT
Start: 2024-10-18 | End: 2025-04-16

## 2024-10-18 RX ORDER — AMLODIPINE BESYLATE 10 MG/1
10 TABLET ORAL DAILY
Qty: 90 TABLET | Refills: 1 | OUTPATIENT
Start: 2024-10-18

## 2024-11-11 ENCOUNTER — APPOINTMENT (OUTPATIENT)
Dept: PRIMARY CARE | Facility: CLINIC | Age: 64
End: 2024-11-11
Payer: COMMERCIAL

## 2024-11-11 VITALS
OXYGEN SATURATION: 93 % | TEMPERATURE: 97 F | HEIGHT: 67 IN | WEIGHT: 144.2 LBS | DIASTOLIC BLOOD PRESSURE: 88 MMHG | HEART RATE: 78 BPM | BODY MASS INDEX: 22.63 KG/M2 | SYSTOLIC BLOOD PRESSURE: 140 MMHG

## 2024-11-11 DIAGNOSIS — I15.9 SECONDARY HYPERTENSION: ICD-10-CM

## 2024-11-11 DIAGNOSIS — Z00.00 HEALTHCARE MAINTENANCE: Primary | ICD-10-CM

## 2024-11-11 DIAGNOSIS — I73.9 PAD (PERIPHERAL ARTERY DISEASE) (CMS-HCC): ICD-10-CM

## 2024-11-11 DIAGNOSIS — I25.10 CORONARY ARTERY DISEASE INVOLVING NATIVE HEART, UNSPECIFIED VESSEL OR LESION TYPE, UNSPECIFIED WHETHER ANGINA PRESENT: ICD-10-CM

## 2024-11-11 DIAGNOSIS — J44.9 CHRONIC OBSTRUCTIVE PULMONARY DISEASE, UNSPECIFIED COPD TYPE (MULTI): ICD-10-CM

## 2024-11-11 DIAGNOSIS — Z12.11 COLON CANCER SCREENING: ICD-10-CM

## 2024-11-11 LAB
ALBUMIN SERPL BCP-MCNC: 4 G/DL (ref 3.4–5)
ALP SERPL-CCNC: 67 U/L (ref 33–136)
ALT SERPL W P-5'-P-CCNC: 20 U/L (ref 10–52)
ANION GAP SERPL CALC-SCNC: 12 MMOL/L (ref 10–20)
AST SERPL W P-5'-P-CCNC: 22 U/L (ref 9–39)
BASOPHILS # BLD AUTO: 0.04 X10*3/UL (ref 0–0.1)
BASOPHILS NFR BLD AUTO: 0.6 %
BILIRUB SERPL-MCNC: 0.4 MG/DL (ref 0–1.2)
BUN SERPL-MCNC: 16 MG/DL (ref 6–23)
CALCIUM SERPL-MCNC: 9.1 MG/DL (ref 8.6–10.3)
CHLORIDE SERPL-SCNC: 101 MMOL/L (ref 98–107)
CHOLEST SERPL-MCNC: 164 MG/DL (ref 0–199)
CHOLESTEROL/HDL RATIO: 1.8
CO2 SERPL-SCNC: 25 MMOL/L (ref 21–32)
CREAT SERPL-MCNC: 0.87 MG/DL (ref 0.5–1.3)
EGFRCR SERPLBLD CKD-EPI 2021: >90 ML/MIN/1.73M*2
EOSINOPHIL # BLD AUTO: 0.17 X10*3/UL (ref 0–0.7)
EOSINOPHIL NFR BLD AUTO: 2.5 %
ERYTHROCYTE [DISTWIDTH] IN BLOOD BY AUTOMATED COUNT: 14.4 % (ref 11.5–14.5)
GLUCOSE SERPL-MCNC: 70 MG/DL (ref 74–99)
HCT VFR BLD AUTO: 39.8 % (ref 41–52)
HDLC SERPL-MCNC: 89.8 MG/DL
HGB BLD-MCNC: 13.2 G/DL (ref 13.5–17.5)
IMM GRANULOCYTES # BLD AUTO: 0.02 X10*3/UL (ref 0–0.7)
IMM GRANULOCYTES NFR BLD AUTO: 0.3 % (ref 0–0.9)
LDLC SERPL CALC-MCNC: 62 MG/DL
LYMPHOCYTES # BLD AUTO: 1.95 X10*3/UL (ref 1.2–4.8)
LYMPHOCYTES NFR BLD AUTO: 28.7 %
MCH RBC QN AUTO: 32.8 PG (ref 26–34)
MCHC RBC AUTO-ENTMCNC: 33.2 G/DL (ref 32–36)
MCV RBC AUTO: 99 FL (ref 80–100)
MONOCYTES # BLD AUTO: 0.65 X10*3/UL (ref 0.1–1)
MONOCYTES NFR BLD AUTO: 9.6 %
NEUTROPHILS # BLD AUTO: 3.96 X10*3/UL (ref 1.2–7.7)
NEUTROPHILS NFR BLD AUTO: 58.3 %
NON HDL CHOLESTEROL: 74 MG/DL (ref 0–149)
NRBC BLD-RTO: 0 /100 WBCS (ref 0–0)
PLATELET # BLD AUTO: 288 X10*3/UL (ref 150–450)
POTASSIUM SERPL-SCNC: 4.8 MMOL/L (ref 3.5–5.3)
PROT SERPL-MCNC: 6 G/DL (ref 6.4–8.2)
RBC # BLD AUTO: 4.02 X10*6/UL (ref 4.5–5.9)
SODIUM SERPL-SCNC: 133 MMOL/L (ref 136–145)
TRIGL SERPL-MCNC: 59 MG/DL (ref 0–149)
TSH SERPL-ACNC: 2.75 MIU/L (ref 0.44–3.98)
VLDL: 12 MG/DL (ref 0–40)
WBC # BLD AUTO: 6.8 X10*3/UL (ref 4.4–11.3)

## 2024-11-11 PROCEDURE — 99396 PREV VISIT EST AGE 40-64: CPT

## 2024-11-11 PROCEDURE — 83036 HEMOGLOBIN GLYCOSYLATED A1C: CPT

## 2024-11-11 PROCEDURE — 80061 LIPID PANEL: CPT

## 2024-11-11 PROCEDURE — 82306 VITAMIN D 25 HYDROXY: CPT

## 2024-11-11 PROCEDURE — 80053 COMPREHEN METABOLIC PANEL: CPT

## 2024-11-11 PROCEDURE — 82746 ASSAY OF FOLIC ACID SERUM: CPT

## 2024-11-11 PROCEDURE — 84443 ASSAY THYROID STIM HORMONE: CPT

## 2024-11-11 PROCEDURE — 3008F BODY MASS INDEX DOCD: CPT

## 2024-11-11 PROCEDURE — 85025 COMPLETE CBC W/AUTO DIFF WBC: CPT

## 2024-11-11 PROCEDURE — 3079F DIAST BP 80-89 MM HG: CPT

## 2024-11-11 PROCEDURE — 82607 VITAMIN B-12: CPT

## 2024-11-11 PROCEDURE — 3077F SYST BP >= 140 MM HG: CPT

## 2024-11-11 RX ORDER — ATORVASTATIN CALCIUM 40 MG/1
40 TABLET, FILM COATED ORAL DAILY
Qty: 30 TABLET | Refills: 0 | Status: SHIPPED | OUTPATIENT
Start: 2024-11-11 | End: 2024-12-11

## 2024-11-11 RX ORDER — AMLODIPINE BESYLATE 10 MG/1
10 TABLET ORAL DAILY
Qty: 30 TABLET | Refills: 0 | Status: SHIPPED | OUTPATIENT
Start: 2024-11-11 | End: 2024-12-11

## 2024-11-11 RX ORDER — FLUTICASONE PROPIONATE AND SALMETEROL 250; 50 UG/1; UG/1
1 POWDER RESPIRATORY (INHALATION)
Qty: 60 EACH | Refills: 0 | Status: SHIPPED | OUTPATIENT
Start: 2024-11-11 | End: 2024-12-11

## 2024-11-11 RX ORDER — LISINOPRIL AND HYDROCHLOROTHIAZIDE 12.5; 2 MG/1; MG/1
1 TABLET ORAL DAILY
Qty: 30 TABLET | Refills: 0 | Status: SHIPPED | OUTPATIENT
Start: 2024-11-11 | End: 2024-12-11

## 2024-11-11 RX ORDER — ALBUTEROL SULFATE 90 UG/1
1 INHALANT RESPIRATORY (INHALATION) EVERY 4 HOURS PRN
Qty: 18 G | Refills: 1 | Status: SHIPPED | OUTPATIENT
Start: 2024-11-11

## 2024-11-11 ASSESSMENT — PROMIS GLOBAL HEALTH SCALE
RATE_AVERAGE_PAIN: 0
CARRYOUT_SOCIAL_ACTIVITIES: EXCELLENT
RATE_PHYSICAL_HEALTH: GOOD
RATE_GENERAL_HEALTH: VERY GOOD
CARRYOUT_PHYSICAL_ACTIVITIES: COMPLETELY
RATE_QUALITY_OF_LIFE: EXCELLENT
RATE_SOCIAL_SATISFACTION: EXCELLENT
EMOTIONAL_PROBLEMS: NEVER
RATE_MENTAL_HEALTH: EXCELLENT

## 2024-11-11 ASSESSMENT — ENCOUNTER SYMPTOMS
LOSS OF SENSATION IN FEET: 0
DEPRESSION: 0
OCCASIONAL FEELINGS OF UNSTEADINESS: 0

## 2024-11-11 ASSESSMENT — PATIENT HEALTH QUESTIONNAIRE - PHQ9
SUM OF ALL RESPONSES TO PHQ9 QUESTIONS 1 AND 2: 0
1. LITTLE INTEREST OR PLEASURE IN DOING THINGS: NOT AT ALL
2. FEELING DOWN, DEPRESSED OR HOPELESS: NOT AT ALL

## 2024-11-11 NOTE — PROGRESS NOTES
"Subjective   Patient ID: Iam Acevedo \"Brandon\" is a 64 y.o. male who presents for Annual Exam.  Subjective  Brandon Acevedo is a 64 y.o. male and is here for a comprehensive physical exam. The patient reports no problems.    Do you take any herbs or supplements that were not prescribed by a doctor? no  Are you taking calcium supplements? no  Are you taking aspirin daily? no       Do you have pain that bothers you in your daily life? no  [unfilled]     Objective  [unfilled]    Assessment/Plan  Healthy male exam.      1. Doing well, COPD stable, declined CT lung screening, declined colonoscopy, ordered cologuard  2. Patient Counseling:  --Nutrition: Stressed importance of moderation in sodium/caffeine intake, saturated fat and cholesterol, caloric balance, sufficient intake of fresh fruits, vegetables, fiber, calcium, iron, and 1 mg of folate supplement per day (for females capable of pregnancy).  --Exercise: Stressed the importance of regular exercise.   --Substance Abuse: Discussed cessation/primary prevention of tobacco, alcohol, or other drug use; driving or other dangerous activities under the influence; availability of treatment for abuse.    --Sexuality: Discussed sexually transmitted diseases, partner selection, use of condoms, avoidance of unintended pregnancy  and contraceptive alternatives.   --Injury prevention: Discussed safety belts, safety helmets, smoke detector, smoking near bedding or upholstery.   --Dental health: Discussed importance of regular tooth brushing, flossing, and dental visits.  --Immunizations reviewed.  --Discussed benefits of screening colonoscopy.  --After hours service discussed with patient  3. Discussed the patient's BMI with him.  The BMI is in the acceptable range.  4. Follow up as needed for acute illness          Vitals:    11/11/24 1609   BP: 140/88   Pulse: 78   Temp: 36.1 °C (97 °F)   SpO2: 93%       Review of Systems    Objective   Physical Exam    Assessment/Plan "   Problem List Items Addressed This Visit       CAD (coronary artery disease)    Relevant Medications    amLODIPine (Norvasc) 10 mg tablet    atorvastatin (Lipitor) 40 mg tablet    COPD (chronic obstructive pulmonary disease) (Multi)    Relevant Medications    albuterol 90 mcg/actuation inhaler    fluticasone propion-salmeteroL (Advair Diskus) 250-50 mcg/dose diskus inhaler    Hypertension    Relevant Medications    amLODIPine (Norvasc) 10 mg tablet    lisinopriL-hydrochlorothiazide 20-12.5 mg tablet    PAD (peripheral artery disease) (CMS-HCC)    Relevant Medications    atorvastatin (Lipitor) 40 mg tablet    Other Relevant Orders    Referral to Vascular Surgery     Other Visit Diagnoses       Healthcare maintenance    -  Primary    Relevant Orders    Vitamin D 25-Hydroxy,Total (for eval of Vitamin D levels)    CBC and Auto Differential    Comprehensive Metabolic Panel    Lipid Panel    TSH with reflex to Free T4 if abnormal    Folate    Vitamin B12    Hemoglobin A1C    Colon cancer screening        Relevant Orders    Cologuard® colon cancer screening                 Thank you for coming in today, please call my office if you have any concerns or questions.     Ángel BONNER, CNP

## 2024-11-11 NOTE — PATIENT INSTRUCTIONS
Blood work  Referral to Dr. Stern, Vascular surgeon for PAD    Thank you for coming in today, if any questions or concerns arise, please call my office.   Ángel Mccarty, KAILEY-CNP

## 2024-11-12 LAB
25(OH)D3 SERPL-MCNC: 19 NG/ML (ref 30–100)
EST. AVERAGE GLUCOSE BLD GHB EST-MCNC: 108 MG/DL
FOLATE SERPL-MCNC: 13.5 NG/ML
HBA1C MFR BLD: 5.4 %
VIT B12 SERPL-MCNC: 341 PG/ML (ref 211–911)

## 2024-11-13 NOTE — RESULT ENCOUNTER NOTE
Results are normal, please notify the patient. Low vitamin d I would add 2k units daily otc for the winter

## 2024-12-10 DIAGNOSIS — I15.9 SECONDARY HYPERTENSION: ICD-10-CM

## 2024-12-10 DIAGNOSIS — J44.9 CHRONIC OBSTRUCTIVE PULMONARY DISEASE, UNSPECIFIED COPD TYPE (MULTI): Primary | ICD-10-CM

## 2024-12-10 DIAGNOSIS — I25.10 CORONARY ARTERY DISEASE INVOLVING NATIVE HEART, UNSPECIFIED VESSEL OR LESION TYPE, UNSPECIFIED WHETHER ANGINA PRESENT: ICD-10-CM

## 2024-12-10 DIAGNOSIS — I73.9 PAD (PERIPHERAL ARTERY DISEASE) (CMS-HCC): ICD-10-CM

## 2024-12-11 RX ORDER — ATORVASTATIN CALCIUM 40 MG/1
40 TABLET, FILM COATED ORAL DAILY
Qty: 90 TABLET | Refills: 1 | Status: SHIPPED | OUTPATIENT
Start: 2024-12-11 | End: 2025-06-09

## 2024-12-11 RX ORDER — FLUTICASONE PROPIONATE AND SALMETEROL 250; 50 UG/1; UG/1
1 POWDER RESPIRATORY (INHALATION)
Qty: 180 EACH | Refills: 1 | Status: SHIPPED | OUTPATIENT
Start: 2024-12-11 | End: 2025-06-09

## 2024-12-11 RX ORDER — AMLODIPINE BESYLATE 10 MG/1
10 TABLET ORAL DAILY
Qty: 90 TABLET | Refills: 1 | Status: SHIPPED | OUTPATIENT
Start: 2024-12-11 | End: 2025-06-09

## 2024-12-11 RX ORDER — LISINOPRIL AND HYDROCHLOROTHIAZIDE 12.5; 2 MG/1; MG/1
1 TABLET ORAL DAILY
Qty: 90 TABLET | Refills: 1 | Status: SHIPPED | OUTPATIENT
Start: 2024-12-11 | End: 2025-06-09

## 2024-12-26 ENCOUNTER — OFFICE VISIT (OUTPATIENT)
Dept: VASCULAR SURGERY | Facility: CLINIC | Age: 64
End: 2024-12-26
Payer: COMMERCIAL

## 2024-12-26 VITALS
HEIGHT: 67 IN | BODY MASS INDEX: 22.68 KG/M2 | SYSTOLIC BLOOD PRESSURE: 131 MMHG | OXYGEN SATURATION: 98 % | HEART RATE: 93 BPM | DIASTOLIC BLOOD PRESSURE: 80 MMHG | WEIGHT: 144.5 LBS

## 2024-12-26 DIAGNOSIS — I73.9 PAD (PERIPHERAL ARTERY DISEASE) (CMS-HCC): ICD-10-CM

## 2024-12-26 PROCEDURE — 99214 OFFICE O/P EST MOD 30 MIN: CPT | Performed by: SURGERY

## 2024-12-26 PROCEDURE — 3079F DIAST BP 80-89 MM HG: CPT | Performed by: SURGERY

## 2024-12-26 PROCEDURE — 3008F BODY MASS INDEX DOCD: CPT | Performed by: SURGERY

## 2024-12-26 PROCEDURE — 3074F SYST BP LT 130 MM HG: CPT | Performed by: SURGERY

## 2024-12-26 PROCEDURE — 99204 OFFICE O/P NEW MOD 45 MIN: CPT | Performed by: SURGERY

## 2024-12-26 ASSESSMENT — PATIENT HEALTH QUESTIONNAIRE - PHQ9
2. FEELING DOWN, DEPRESSED OR HOPELESS: NOT AT ALL
SUM OF ALL RESPONSES TO PHQ9 QUESTIONS 1 AND 2: 0
1. LITTLE INTEREST OR PLEASURE IN DOING THINGS: NOT AT ALL

## 2024-12-26 ASSESSMENT — PAIN SCALES - GENERAL: PAINLEVEL_OUTOF10: 0-NO PAIN

## 2024-12-26 NOTE — PATIENT INSTRUCTIONS
- Plan for PVR with exercise to fully evaluate degree of perfusion  - Continue to wean down smoking  - Followup in 1 month to discuss. Can schedule PVR on same day  - Call office [phone] with any questions or concerns

## 2024-12-26 NOTE — PROGRESS NOTES
Vascular Surgery Clinic Note    CC: BL LE CLTI 3    HPI:  Iam Acevedo is 64 y.o. male with history of BL LE CLTI 3, left worse than right. He has a history of left femoropopliteal atherectomy and iliac stent by Dr. Cyr. He does still smoke 1 ppd but has cut down significantly from 3 ppd. He was taken off plavix by Deena Zhou due to bleeding intolerance. He does have history of cardiac stents for MI. Denies rest pain or foot wounds    Past Vascular History:  3/10/22 ([facility]) - left iliac stent, left femoropopliteal atherectomy    Past Vascular Testing:  RONNA (5/12/23) - R 1.1 (0.68), L 1.09 (0.75)    Medical History:  Patient Active Problem List   Diagnosis    CAD (coronary artery disease)    Claudication of lower extremity (CMS-[facility])    COPD (chronic obstructive pulmonary disease) (Multi)    Hypertension    PAD (peripheral artery disease) (CMS-HCC)    Primary osteoarthritis of left wrist    Thumb pain, left    Sprain of left wrist        Meds:   Current Outpatient Medications on File Prior to Visit   Medication Sig Dispense Refill    albuterol 90 mcg/actuation inhaler Inhale 1 puff every 4 hours if needed for wheezing or shortness of breath. 18 g 1    amLODIPine (Norvasc) 10 mg tablet Take 1 tablet (10 mg) by mouth once daily. 90 tablet 1    atorvastatin (Lipitor) 40 mg tablet Take 1 tablet (40 mg) by mouth once daily. 90 tablet 1    b complex 0.4 mg tablet Take 1 tablet by mouth once daily.      ferrous sulfate 325 (65 Fe) MG EC tablet Take 1 tablet (65 mg) by mouth once daily with a meal. Do not crush, chew, or split.      fluticasone propion-salmeteroL (Advair Diskus) 250-50 mcg/dose diskus inhaler Inhale 1 puff 2 times a day. 180 each 1    lisinopriL-hydrochlorothiazide 20-12.5 mg tablet Take 1 tablet by mouth once daily. 90 tablet 1    multivitamin with minerals (multivitamin-iron-folic acid) tablet Take 1 tablet by mouth once daily.      fluticasone propion-salmeteroL (Advair Diskus) 250-50 mcg/dose  diskus inhaler Inhale 1 puff 2 times a day. (Patient not taking: Reported on 11/11/2024) 90 each 1    fluticasone propion-salmeteroL (Advair Diskus) 250-50 mcg/dose diskus inhaler Inhale 1 puff 2 times a day. Rinse mouth with water after use to reduce aftertaste and incidence of candidiasis. Do not swallow. 60 each 0     No current facility-administered medications on file prior to visit.        Allergies:   No Known Allergies    SH:    Social Drivers of Health     Tobacco Use: High Risk (12/26/2024)    Patient History     Smoking Tobacco Use: Every Day     Smokeless Tobacco Use: Never     Passive Exposure: Not on file   Alcohol Use: Not on file   Financial Resource Strain: Not on file   Food Insecurity: Not on file   Transportation Needs: Not on file   Physical Activity: Not on file   Stress: Not on file   Social Connections: Not on file   Intimate Partner Violence: Not At Risk (8/25/2023)    Humiliation, Afraid, Rape, and Kick questionnaire     Fear of Current or Ex-Partner: No     Emotionally Abused: No     Physically Abused: No     Sexually Abused: No   Depression: Not at risk (12/26/2024)    PHQ-2     PHQ-2 Score: 0   Housing Stability: Not on file   Utilities: Not on file   Digital Equity: Not on file   Health Literacy: Not on file        FH:  No family history on file.     ROS:  All systems were reviewed and are negative except as per HPI.    Objective:  Vitals:  Vitals:    12/26/24 1058   BP: 131/80   Pulse: 93   SpO2:         Exam:  Constitutional: normal, well appearing  HEENT: normocephalic  CV: regular rate  RESP: symmetric expansion, unlabored breathing  GI: soft, nontender, nondistended  MSK: normal ROM  INT: no lesions  PSYCH: appropriate mood  NEURO: no deficits  VASC: palpable femoral bilaterally, left weaker than right; nonpalpable pedal pulses but no wounds    Assessment & Plan:  Iam Acevedo is 64 y.o. male with BL LE CLTI 3 hx of left iliac stent and femoropopliteal atherectomy    - Plan  for PVR with exercise to fully evaluate degree of perfusion  - Continue to wean down smoking  - Followup in 1 month to discuss. Can schedule PVR on same day      Meds  - Atorvastatin 40 mg    Screening/Surveillance  - PVR with exercise (January 2025)    Next Followup  - 1 month    Mabel Turner MD

## 2025-01-30 ENCOUNTER — HOSPITAL ENCOUNTER (OUTPATIENT)
Dept: VASCULAR MEDICINE | Facility: CLINIC | Age: 65
Discharge: HOME | End: 2025-01-30
Payer: COMMERCIAL

## 2025-01-30 ENCOUNTER — APPOINTMENT (OUTPATIENT)
Dept: VASCULAR SURGERY | Facility: CLINIC | Age: 65
End: 2025-01-30
Payer: COMMERCIAL

## 2025-01-30 DIAGNOSIS — I73.9 PAD (PERIPHERAL ARTERY DISEASE) (CMS-HCC): ICD-10-CM

## 2025-01-30 PROCEDURE — 93924 LWR XTR VASC STDY BILAT: CPT

## 2025-01-30 PROCEDURE — 93924 LWR XTR VASC STDY BILAT: CPT | Performed by: INTERNAL MEDICINE

## 2025-02-06 ENCOUNTER — APPOINTMENT (OUTPATIENT)
Dept: VASCULAR SURGERY | Facility: CLINIC | Age: 65
End: 2025-02-06
Payer: COMMERCIAL

## 2025-02-06 VITALS
OXYGEN SATURATION: 95 % | HEIGHT: 67 IN | WEIGHT: 145.5 LBS | DIASTOLIC BLOOD PRESSURE: 82 MMHG | SYSTOLIC BLOOD PRESSURE: 136 MMHG | HEART RATE: 87 BPM | BODY MASS INDEX: 22.84 KG/M2

## 2025-02-06 DIAGNOSIS — I73.9 PAD (PERIPHERAL ARTERY DISEASE) (CMS-HCC): Primary | ICD-10-CM

## 2025-02-06 PROCEDURE — 3078F DIAST BP <80 MM HG: CPT | Performed by: SURGERY

## 2025-02-06 PROCEDURE — 99214 OFFICE O/P EST MOD 30 MIN: CPT | Performed by: SURGERY

## 2025-02-06 PROCEDURE — 3074F SYST BP LT 130 MM HG: CPT | Performed by: SURGERY

## 2025-02-06 PROCEDURE — 3008F BODY MASS INDEX DOCD: CPT | Performed by: SURGERY

## 2025-02-06 ASSESSMENT — PATIENT HEALTH QUESTIONNAIRE - PHQ9
2. FEELING DOWN, DEPRESSED OR HOPELESS: NOT AT ALL
1. LITTLE INTEREST OR PLEASURE IN DOING THINGS: NOT AT ALL
SUM OF ALL RESPONSES TO PHQ9 QUESTIONS 1 AND 2: 0

## 2025-02-06 ASSESSMENT — PAIN SCALES - GENERAL: PAINLEVEL_OUTOF10: 0-NO PAIN

## 2025-02-06 NOTE — PROGRESS NOTES
Vascular Surgery Clinic Note    CC: BL LE CLTI 3     HPI:  Iam Acevedo is 64 y.o. male with history of BL LE CLTI 3, left worse than right. He has a history of left femoropopliteal atherectomy and iliac stent by Dr. Cyr. He does still smoke 1 ppd but has cut down significantly from 3 ppd. He was taken off plavix by Deena Zhou due to bleeding intolerance. He does have history of cardiac stents for MI. Denies rest pain or foot wounds.    He was last seen in vascular surgery clinic on 12/26/24. He presents today to discuss his most recent PVR results.    Past Vascular History:  3/10/22 ([facility]) - left iliac stent, left femoropopliteal atherectomy     Past Vascular Testing:  PVR (1/30/25) - R 0.87 (0.5), L 0.56 (0.38)  RONNA (5/12/23) - R 1.1 (0.68), L 1.09 (0.75)      Medical History:  Patient Active Problem List   Diagnosis    CAD (coronary artery disease)    Claudication of lower extremity (CMS-[facility])    COPD (chronic obstructive pulmonary disease) (Multi)    Hypertension    PAD (peripheral artery disease) (CMS-HCC)    Primary osteoarthritis of left wrist    Thumb pain, left    Sprain of left wrist        Meds:   Current Outpatient Medications on File Prior to Visit   Medication Sig Dispense Refill    albuterol 90 mcg/actuation inhaler Inhale 1 puff every 4 hours if needed for wheezing or shortness of breath. 18 g 1    amLODIPine (Norvasc) 10 mg tablet Take 1 tablet (10 mg) by mouth once daily. 90 tablet 1    atorvastatin (Lipitor) 40 mg tablet Take 1 tablet (40 mg) by mouth once daily. 90 tablet 1    b complex 0.4 mg tablet Take 1 tablet by mouth once daily.      ferrous sulfate 325 (65 Fe) MG EC tablet Take 1 tablet (65 mg) by mouth once daily with a meal. Do not crush, chew, or split.      fluticasone propion-salmeteroL (Advair Diskus) 250-50 mcg/dose diskus inhaler Inhale 1 puff 2 times a day. 180 each 1    lisinopriL-hydrochlorothiazide 20-12.5 mg tablet Take 1 tablet by mouth once daily. 90 tablet 1     multivitamin with minerals (multivitamin-iron-folic acid) tablet Take 1 tablet by mouth once daily.      fluticasone propion-salmeteroL (Advair Diskus) 250-50 mcg/dose diskus inhaler Inhale 1 puff 2 times a day. (Patient not taking: Reported on 11/11/2024) 90 each 1    fluticasone propion-salmeteroL (Advair Diskus) 250-50 mcg/dose diskus inhaler Inhale 1 puff 2 times a day. Rinse mouth with water after use to reduce aftertaste and incidence of candidiasis. Do not swallow. 60 each 0     No current facility-administered medications on file prior to visit.        Allergies:   No Known Allergies    SH:    Social Drivers of Health     Tobacco Use: High Risk (2/6/2025)    Patient History     Smoking Tobacco Use: Every Day     Smokeless Tobacco Use: Never     Passive Exposure: Not on file   Alcohol Use: Not on file   Financial Resource Strain: Not on file   Food Insecurity: Not on file   Transportation Needs: Not on file   Physical Activity: Not on file   Stress: Not on file   Social Connections: Not on file   Intimate Partner Violence: Not At Risk (8/25/2023)    Humiliation, Afraid, Rape, and Kick questionnaire     Fear of Current or Ex-Partner: No     Emotionally Abused: No     Physically Abused: No     Sexually Abused: No   Depression: Not at risk (2/6/2025)    PHQ-2     PHQ-2 Score: 0   Housing Stability: Not on file   Utilities: Not on file   Digital Equity: Not on file   Health Literacy: Not on file        FH:  No family history on file.     ROS:  All systems were reviewed and are negative except as per HPI.    Objective:  Vitals:  Vitals:    02/06/25 1130   BP: 136/82   Pulse: 87   SpO2:         Exam:  Constitutional: normal, well appearing  HEENT: normocephalic  CV: regular rate  RESP: symmetric expansion, unlabored breathing  GI: soft, nontender, nondistended  MSK: normal ROM  INT: no lesions  PSYCH: appropriate mood  NEURO: no deficits  VASC: No foot wounds    Assessment & Plan:  Iam Acevedo is 64 y.o.  male with BL LE CLTI (L > R)    - CTA a/p with runoff to assess iliac stent and blood flow for further evaluation and planning  - Smoking cessation advised  - Followup in 3 weeks to discuss results    Meds  - Atorvastatin 40mg    Screening/Surveillance  - CTA a/p runoff (February 2025)    Next Followup  - 3 weeks    Mabel Turner MD

## 2025-02-06 NOTE — PATIENT INSTRUCTIONS
- CTA a/p with runoff to assess iliac stent and blood flow for further evaluation and planning  - Call radiology [phone] to schedule scan  - Followup in 3 weeks to discuss results  - Call office [phone] with any questions or concerns

## 2025-02-11 DIAGNOSIS — F17.200 SMOKER: Primary | ICD-10-CM

## 2025-02-11 LAB
ALBUMIN SERPL-MCNC: 4.3 G/DL (ref 3.6–5.1)
BUN SERPL-MCNC: 13 MG/DL (ref 7–25)
BUN/CREAT SERPL: ABNORMAL (CALC) (ref 6–22)
CALCIUM SERPL-MCNC: 9.2 MG/DL (ref 8.6–10.3)
CHLORIDE SERPL-SCNC: 100 MMOL/L (ref 98–110)
CO2 SERPL-SCNC: 26 MMOL/L (ref 20–32)
CREAT SERPL-MCNC: 0.77 MG/DL (ref 0.7–1.35)
EGFRCR SERPLBLD CKD-EPI 2021: 100 ML/MIN/1.73M2
GLUCOSE SERPL-MCNC: 78 MG/DL (ref 65–99)
PHOSPHATE SERPL-MCNC: 3.6 MG/DL (ref 2.5–4.5)
POTASSIUM SERPL-SCNC: 3.9 MMOL/L (ref 3.5–5.3)
SODIUM SERPL-SCNC: 134 MMOL/L (ref 135–146)

## 2025-02-11 RX ORDER — IBUPROFEN 200 MG
1 TABLET ORAL EVERY 24 HOURS
Qty: 30 PATCH | Refills: 0 | Status: SHIPPED | OUTPATIENT
Start: 2025-02-11 | End: 2025-03-13

## 2025-02-20 ENCOUNTER — HOSPITAL ENCOUNTER (OUTPATIENT)
Dept: RADIOLOGY | Facility: HOSPITAL | Age: 65
Discharge: HOME | End: 2025-02-20
Payer: COMMERCIAL

## 2025-02-20 DIAGNOSIS — I73.9 PAD (PERIPHERAL ARTERY DISEASE) (CMS-HCC): ICD-10-CM

## 2025-02-20 PROCEDURE — 2550000001 HC RX 255 CONTRASTS: Performed by: SURGERY

## 2025-02-20 PROCEDURE — 75635 CT ANGIO ABDOMINAL ARTERIES: CPT

## 2025-02-20 RX ADMIN — IOHEXOL 100 ML: 350 INJECTION, SOLUTION INTRAVENOUS at 14:36

## 2025-02-27 ENCOUNTER — OFFICE VISIT (OUTPATIENT)
Dept: VASCULAR SURGERY | Facility: CLINIC | Age: 65
End: 2025-02-27
Payer: COMMERCIAL

## 2025-02-27 VITALS
HEIGHT: 67 IN | WEIGHT: 149.4 LBS | HEART RATE: 84 BPM | DIASTOLIC BLOOD PRESSURE: 84 MMHG | BODY MASS INDEX: 23.45 KG/M2 | SYSTOLIC BLOOD PRESSURE: 135 MMHG | OXYGEN SATURATION: 95 %

## 2025-02-27 DIAGNOSIS — I73.9 PAD (PERIPHERAL ARTERY DISEASE) (CMS-HCC): Primary | ICD-10-CM

## 2025-02-27 PROCEDURE — 99215 OFFICE O/P EST HI 40 MIN: CPT | Performed by: SURGERY

## 2025-02-27 PROCEDURE — 3008F BODY MASS INDEX DOCD: CPT | Performed by: SURGERY

## 2025-02-27 PROCEDURE — 3079F DIAST BP 80-89 MM HG: CPT | Performed by: SURGERY

## 2025-02-27 PROCEDURE — 3074F SYST BP LT 130 MM HG: CPT | Performed by: SURGERY

## 2025-02-27 ASSESSMENT — PATIENT HEALTH QUESTIONNAIRE - PHQ9
1. LITTLE INTEREST OR PLEASURE IN DOING THINGS: NOT AT ALL
2. FEELING DOWN, DEPRESSED OR HOPELESS: NOT AT ALL
SUM OF ALL RESPONSES TO PHQ9 QUESTIONS 1 AND 2: 0

## 2025-02-27 ASSESSMENT — PAIN SCALES - GENERAL: PAINLEVEL_OUTOF10: 0-NO PAIN

## 2025-02-27 NOTE — PATIENT INSTRUCTIONS
- Plan for left femoral endarterectomy and angiography at [facility] [place]  - Cardiac clearance   - Will call with scheduling updates and timing  - Call office [phone] with any questions or concerns

## 2025-03-09 NOTE — PROGRESS NOTES
Vascular Surgery Clinic Note    CC: BL LE CLTI 3     HPI:  Iam Acevedo is 64 y.o. male with history of BL LE CLTI 3, left worse than right. He has a history of left femoropopliteal atherectomy and iliac stent by Dr. Cyr. He does still smoke 1 ppd but has cut down significantly from 3 ppd. He was taken off plavix by De[facility] Zhou due to bleeding intolerance. He does have history of cardiac stents for MI. Denies rest pain or foot wounds.      His CTA demonstrates left femoral disease and infrainguinal disease. He has nearly quit smoking now.    Past Vascular History:      Past Vascular Testing:      Medical History:  Patient Active Problem List   Diagnosis    CAD (coronary artery disease)    Claudication of lower extremity (CMS-[facility])    COPD (chronic obstructive pulmonary disease) (Multi)    Hypertension    PAD (peripheral artery disease) (CMS-HCC)    Primary osteoarthritis of left wrist    Thumb pain, left    Sprain of left wrist        Meds:   Current Outpatient Medications on File Prior to Visit   Medication Sig Dispense Refill    albuterol 90 mcg/actuation inhaler Inhale 1 puff every 4 hours if needed for wheezing or shortness of breath. 18 g 1    amLODIPine (Norvasc) 10 mg tablet Take 1 tablet (10 mg) by mouth once daily. 90 tablet 1    atorvastatin (Lipitor) 40 mg tablet Take 1 tablet (40 mg) by mouth once daily. 90 tablet 1    b complex 0.4 mg tablet Take 1 tablet by mouth once daily.      ferrous sulfate 325 (65 Fe) MG EC tablet Take 1 tablet (65 mg) by mouth once daily with a meal. Do not crush, chew, or split.      fluticasone propion-salmeteroL (Advair Diskus) 250-50 mcg/dose diskus inhaler Inhale 1 puff 2 times a day. 180 each 1    lisinopriL-hydrochlorothiazide 20-12.5 mg tablet Take 1 tablet by mouth once daily. 90 tablet 1    multivitamin with minerals (multivitamin-iron-folic acid) tablet Take 1 tablet by mouth once daily.      nicotine (Nicoderm CQ) 14 mg/24 hr patch Place 1 patch over 24  hours on the skin once every 24 hours. 30 patch 0    fluticasone propion-salmeteroL (Advair Diskus) 250-50 mcg/dose diskus inhaler Inhale 1 puff 2 times a day. (Patient not taking: Reported on 11/11/2024) 90 each 1    fluticasone propion-salmeteroL (Advair Diskus) 250-50 mcg/dose diskus inhaler Inhale 1 puff 2 times a day. Rinse mouth with water after use to reduce aftertaste and incidence of candidiasis. Do not swallow. 60 each 0     No current facility-administered medications on file prior to visit.        Allergies:   No Known Allergies    SH:    Social Drivers of Health     Tobacco Use: High Risk (2/27/2025)    Patient History     Smoking Tobacco Use: Some Days     Smokeless Tobacco Use: Never     Passive Exposure: Not on file   Alcohol Use: Not on file   Financial Resource Strain: Not on file   Food Insecurity: Not on file   Transportation Needs: Not on file   Physical Activity: Not on file   Stress: Not on file   Social Connections: Not on file   Intimate Partner Violence: Not At Risk (8/25/2023)    Humiliation, Afraid, Rape, and Kick questionnaire     Fear of Current or Ex-Partner: No     Emotionally Abused: No     Physically Abused: No     Sexually Abused: No   Depression: Not at risk (2/27/2025)    PHQ-2     PHQ-2 Score: 0   Housing Stability: Not on file   Utilities: Not on file   Digital Equity: Not on file   Health Literacy: Not on file        FH:  No family history on file.     ROS:  All systems were reviewed and are negative except as per HPI.    Objective:  Vitals:  Vitals:    02/27/25 1434   BP: 135/84   Pulse: 84   SpO2:         Exam:  Constitutional: normal, well appearing  HEENT: normocephalic  CV: regular rate  RESP: symmetric expansion, unlabored breathing  GI: soft, nontender, nondistended  MSK: normal ROM  INT: no lesions  PSYCH: appropriate mood  NEURO: no deficits  VASC:     Assessment & Plan:  Iam Acevedo is 64 y.o. male with LLE CLTI 3  - Plan for left femoral endarterectomy and  angiography at [facility] [place]  - Cardiac clearance   - Will call with scheduling updates and timing    Meds      Screening/Surveillance      Next Followup      Mabel Turner MD

## 2025-03-09 NOTE — H&P (VIEW-ONLY)
Vascular Surgery Clinic Note    CC: BL LE CLTI 3     HPI:  Iam Acevedo is 64 y.o. male with history of BL LE CLTI 3, left worse than right. He has a history of left femoropopliteal atherectomy and iliac stent by Dr. Cyr. He does still smoke 1 ppd but has cut down significantly from 3 ppd. He was taken off plavix by Deena Zhou due to bleeding intolerance. He does have history of cardiac stents for MI. Denies rest pain or foot wounds.      His CTA demonstrates left femoral disease and infrainguinal disease. He has nearly quit smoking now.    Past Vascular History:      Past Vascular Testing:      Medical History:  Patient Active Problem List   Diagnosis    CAD (coronary artery disease)    Claudication of lower extremity (CMS-[facility])    COPD (chronic obstructive pulmonary disease) (Multi)    Hypertension    PAD (peripheral artery disease) (CMS-HCC)    Primary osteoarthritis of left wrist    Thumb pain, left    Sprain of left wrist        Meds:   Current Outpatient Medications on File Prior to Visit   Medication Sig Dispense Refill    albuterol 90 mcg/actuation inhaler Inhale 1 puff every 4 hours if needed for wheezing or shortness of breath. 18 g 1    amLODIPine (Norvasc) 10 mg tablet Take 1 tablet (10 mg) by mouth once daily. 90 tablet 1    atorvastatin (Lipitor) 40 mg tablet Take 1 tablet (40 mg) by mouth once daily. 90 tablet 1    b complex 0.4 mg tablet Take 1 tablet by mouth once daily.      ferrous sulfate 325 (65 Fe) MG EC tablet Take 1 tablet (65 mg) by mouth once daily with a meal. Do not crush, chew, or split.      fluticasone propion-salmeteroL (Advair Diskus) 250-50 mcg/dose diskus inhaler Inhale 1 puff 2 times a day. 180 each 1    lisinopriL-hydrochlorothiazide 20-12.5 mg tablet Take 1 tablet by mouth once daily. 90 tablet 1    multivitamin with minerals (multivitamin-iron-folic acid) tablet Take 1 tablet by mouth once daily.      nicotine (Nicoderm CQ) 14 mg/24 hr patch Place 1 patch over 24  hours on the skin once every 24 hours. 30 patch 0    fluticasone propion-salmeteroL (Advair Diskus) 250-50 mcg/dose diskus inhaler Inhale 1 puff 2 times a day. (Patient not taking: Reported on 11/11/2024) 90 each 1    fluticasone propion-salmeteroL (Advair Diskus) 250-50 mcg/dose diskus inhaler Inhale 1 puff 2 times a day. Rinse mouth with water after use to reduce aftertaste and incidence of candidiasis. Do not swallow. 60 each 0     No current facility-administered medications on file prior to visit.        Allergies:   No Known Allergies    SH:    Social Drivers of Health     Tobacco Use: High Risk (2/27/2025)    Patient History     Smoking Tobacco Use: Some Days     Smokeless Tobacco Use: Never     Passive Exposure: Not on file   Alcohol Use: Not on file   Financial Resource Strain: Not on file   Food Insecurity: Not on file   Transportation Needs: Not on file   Physical Activity: Not on file   Stress: Not on file   Social Connections: Not on file   Intimate Partner Violence: Not At Risk (8/25/2023)    Humiliation, Afraid, Rape, and Kick questionnaire     Fear of Current or Ex-Partner: No     Emotionally Abused: No     Physically Abused: No     Sexually Abused: No   Depression: Not at risk (2/27/2025)    PHQ-2     PHQ-2 Score: 0   Housing Stability: Not on file   Utilities: Not on file   Digital Equity: Not on file   Health Literacy: Not on file        FH:  No family history on file.     ROS:  All systems were reviewed and are negative except as per HPI.    Objective:  Vitals:  Vitals:    02/27/25 1434   BP: 135/84   Pulse: 84   SpO2:         Exam:  Constitutional: normal, well appearing  HEENT: normocephalic  CV: regular rate  RESP: symmetric expansion, unlabored breathing  GI: soft, nontender, nondistended  MSK: normal ROM  INT: no lesions  PSYCH: appropriate mood  NEURO: no deficits  VASC:     Assessment & Plan:  Iam Acevedo is 64 y.o. male with LLE CLTI 3  - Plan for left femoral endarterectomy and  angiography at [place]  - Cardiac clearance   - Will call with scheduling updates and timing    Meds      Screening/Surveillance      Next Followup      Mabel Turner MD

## 2025-03-17 ENCOUNTER — PRE-ADMISSION TESTING (OUTPATIENT)
Dept: PREADMISSION TESTING | Facility: HOSPITAL | Age: 65
End: 2025-03-17
Payer: COMMERCIAL

## 2025-03-17 ENCOUNTER — ANESTHESIA EVENT (OUTPATIENT)
Dept: OPERATING ROOM | Facility: HOSPITAL | Age: 65
End: 2025-03-17
Payer: COMMERCIAL

## 2025-03-17 VITALS
DIASTOLIC BLOOD PRESSURE: 84 MMHG | SYSTOLIC BLOOD PRESSURE: 121 MMHG | TEMPERATURE: 98.4 F | WEIGHT: 145.1 LBS | OXYGEN SATURATION: 97 % | HEART RATE: 100 BPM | HEIGHT: 67 IN | BODY MASS INDEX: 22.78 KG/M2

## 2025-03-17 DIAGNOSIS — Z01.811 PREOPERATIVE RESPIRATORY EXAMINATION: ICD-10-CM

## 2025-03-17 DIAGNOSIS — Z01.810 PREOPERATIVE CARDIOVASCULAR EXAMINATION: ICD-10-CM

## 2025-03-17 DIAGNOSIS — Z01.818 PREOP TESTING: ICD-10-CM

## 2025-03-17 DIAGNOSIS — I73.9 PAD (PERIPHERAL ARTERY DISEASE) (CMS-HCC): Primary | ICD-10-CM

## 2025-03-17 DIAGNOSIS — Z01.818 PREOPERATIVE CLEARANCE: ICD-10-CM

## 2025-03-17 DIAGNOSIS — I25.10 CORONARY ARTERY DISEASE INVOLVING NATIVE HEART WITHOUT ANGINA PECTORIS, UNSPECIFIED VESSEL OR LESION TYPE: ICD-10-CM

## 2025-03-17 DIAGNOSIS — I50.9 HEART FAILURE, UNSPECIFIED HF CHRONICITY, UNSPECIFIED HEART FAILURE TYPE: ICD-10-CM

## 2025-03-17 LAB
ABO GROUP (TYPE) IN BLOOD: NORMAL
ANION GAP SERPL CALC-SCNC: 14 MMOL/L (ref 10–20)
ANTIBODY SCREEN: NORMAL
BUN SERPL-MCNC: 13 MG/DL (ref 6–23)
CALCIUM SERPL-MCNC: 9.5 MG/DL (ref 8.6–10.6)
CHLORIDE SERPL-SCNC: 98 MMOL/L (ref 98–107)
CO2 SERPL-SCNC: 26 MMOL/L (ref 21–32)
CREAT SERPL-MCNC: 0.72 MG/DL (ref 0.5–1.3)
EGFRCR SERPLBLD CKD-EPI 2021: >90 ML/MIN/1.73M*2
ERYTHROCYTE [DISTWIDTH] IN BLOOD BY AUTOMATED COUNT: 12.8 % (ref 11.5–14.5)
GLUCOSE SERPL-MCNC: 91 MG/DL (ref 74–99)
HCT VFR BLD AUTO: 43.8 % (ref 41–52)
HGB BLD-MCNC: 14.9 G/DL (ref 13.5–17.5)
MCH RBC QN AUTO: 32.8 PG (ref 26–34)
MCHC RBC AUTO-ENTMCNC: 34 G/DL (ref 32–36)
MCV RBC AUTO: 97 FL (ref 80–100)
NRBC BLD-RTO: 0 /100 WBCS (ref 0–0)
PLATELET # BLD AUTO: 284 X10*3/UL (ref 150–450)
POTASSIUM SERPL-SCNC: 4.7 MMOL/L (ref 3.5–5.3)
RBC # BLD AUTO: 4.54 X10*6/UL (ref 4.5–5.9)
RH FACTOR (ANTIGEN D): NORMAL
SODIUM SERPL-SCNC: 133 MMOL/L (ref 136–145)
WBC # BLD AUTO: 7.5 X10*3/UL (ref 4.4–11.3)

## 2025-03-17 PROCEDURE — 85027 COMPLETE CBC AUTOMATED: CPT | Performed by: SURGERY

## 2025-03-17 PROCEDURE — 80048 BASIC METABOLIC PNL TOTAL CA: CPT | Performed by: SURGERY

## 2025-03-17 PROCEDURE — 86901 BLOOD TYPING SEROLOGIC RH(D): CPT | Performed by: SURGERY

## 2025-03-17 PROCEDURE — 99205 OFFICE O/P NEW HI 60 MIN: CPT | Performed by: ANESTHESIOLOGY

## 2025-03-17 RX ORDER — ASPIRIN 81 MG/1
81 TABLET ORAL DAILY
COMMUNITY

## 2025-03-17 RX ORDER — CHLORHEXIDINE GLUCONATE ORAL RINSE 1.2 MG/ML
15 SOLUTION DENTAL DAILY
Qty: 30 ML | Refills: 0 | Status: SHIPPED | OUTPATIENT
Start: 2025-03-17 | End: 2025-03-19

## 2025-03-17 RX ORDER — CHLORHEXIDINE GLUCONATE 40 MG/ML
1 SOLUTION TOPICAL DAILY
Qty: 25 ML | Refills: 0 | Status: SHIPPED | OUTPATIENT
Start: 2025-03-17 | End: 2025-03-22

## 2025-03-17 ASSESSMENT — DUKE ACTIVITY SCORE INDEX (DASI)
CAN YOU TAKE CARE OF YOURSELF (EAT, DRESS, BATHE, OR USE TOILET): YES
CAN YOU DO LIGHT WORK AROUND THE HOUSE LIKE DUSTING OR WASHING DISHES: YES
TOTAL_SCORE: 42.7
CAN YOU DO MODERATE WORK AROUND THE HOUSE LIKE VACUUMING, SWEEPING FLOORS OR CARRYING GROCERIES: YES
CAN YOU DO HEAVY WORK AROUND THE HOUSE LIKE SCRUBBING FLOORS OR LIFTING AND MOVING HEAVY FURNITURE: YES
DASI METS SCORE: 8
CAN YOU PARTICIPATE IN MODERATE RECREATIONAL ACTIVITIES LIKE GOLF, BOWLING, DANCING, DOUBLES TENNIS OR THROWING A BASEBALL OR FOOTBALL: YES
CAN YOU DO YARD WORK LIKE RAKING LEAVES, WEEDING OR PUSHING A MOWER: YES
CAN YOU CLIMB A FLIGHT OF STAIRS OR WALK UP A HILL: YES
CAN YOU WALK INDOORS, SUCH AS AROUND YOUR HOUSE: YES
CAN YOU WALK A BLOCK OR TWO ON LEVEL GROUND: YES
CAN YOU HAVE SEXUAL RELATIONS: YES
CAN YOU RUN A SHORT DISTANCE: NO
CAN YOU PARTICIPATE IN STRENOUS SPORTS LIKE SWIMMING, SINGLES TENNIS, FOOTBALL, BASKETBALL, OR SKIING: NO

## 2025-03-17 ASSESSMENT — ENCOUNTER SYMPTOMS
COUGH: 0
TROUBLE SWALLOWING: 0
NUMBNESS: 0
NECK PAIN: 0
NECK SWELLING: 0
DYSURIA: 0
NAUSEA: 0
VISUAL CHANGE: 0
DIFFICULTY URINATING: 0
FEVER: 0
RHINORRHEA: 0
DIARRHEA: 0
ARTHRALGIAS: 0
MYALGIAS: 0
WEAKNESS: 0
SINUS CONGESTION: 0
WHEEZING: 0
PALPITATIONS: 0
EXCESSIVE BLEEDING: 0
CHILLS: 0
SKIN CHANGES: 0
EYE DISCHARGE: 0

## 2025-03-17 ASSESSMENT — PAIN - FUNCTIONAL ASSESSMENT: PAIN_FUNCTIONAL_ASSESSMENT: 0-10

## 2025-03-17 ASSESSMENT — PAIN SCALES - GENERAL: PAINLEVEL_OUTOF10: 0 - NO PAIN

## 2025-03-17 NOTE — PREPROCEDURE INSTRUCTIONS
Thank you for visiting [facility] today for your pre-procedure evaluation, you were seen by Dr. Ken Jarvis ([phone])    This summary includes instructions and information to aid you during your perioperative period.  Please read carefully. If you have any questions about your visit today, please call the number listed above.  If you become ill or have any changes to your health before your surgery, please contact your primary care provider and alert your surgeon.    Preparing for your Surgery       Exercises  Preoperative Deep Breathing Exercises  Why it is important to do deep breathing exercises before my surgery?  Deep breathing exercises strengthen your breathing muscles.  This helps you to recover after your surgery and decreases the chance of breathing complications.  How are the deep breathing exercises done?  Sit straight with your back supported.  Breathe in deeply and slowly through your nose. Your lower rib cage should expand and your abdomen may move forward.  Hold that breath for 3 to 5 seconds.  Breathe out through pursed lips, slowly and completely.  Rest and repeat 10 times every hour while awake.  Rest longer if you become dizzy or lightheaded.        Preoperative Brain Exercises    What are brain exercises?  A brain exercise is any activity that engages your thinking (cognitive) skills.    What types of activities are considered brain exercises?  Jigsaw puzzles, crossword puzzles, word jumble, memory games, word search, and many more.  Many can be found free online or on your phone via a mobile maria a.    Why should I do brain exercises before my surgery?  More recent research has shown brain exercise before surgery can lower the risk of postoperative delirium (confusion) which can be especially important for older adults.  Patients who did brain exercises for 5 to 10 hours the days before surgery, cut their risk of postoperative delirium in half up to 1  week after surgery.    Sit-to-Stand Exercise    What is the sit-to-stand exercise?  The sit-to-stand exercise strengthens the muscles of your lower body and muscles in the center of your body (core muscles for stability) helping to maintain and improve your strength and mobility.  How do I do the sit-to-stand exercise?  The goal is to do this exercise without using your arms or hands.  If this is too difficult, use your arms and hands or a chair with armrests to help slowly push yourself to the standing position and lower yourself back to the sitting position. As the movement becomes easier use your arms and hands less.    Steps to the sit-to-stand exercise  Sit up tall in a sturdy chair, knees bent, feet flat on the floor shoulder-width apart.  Shift your hips/pelvis forward in the chair to correctly position yourself for the next movement.  Lean forward at your hips.  Stand up straight putting equal weight on both feet.  Check to be sure you are properly aligned with the chair, in a slow controlled movement sit back down.  Repeat this exercise 10-15 times.  If needed you can do it fewer times until your strength improves.  Rest for 1 minute.  Do another 10-15 sit-to-stand exercises.  Try to do this in the morning and evening.        Instructions    Preoperative Fasting Guidelines    Why must I stop eating and drinking near surgery time?  With sedation, food or liquid in your stomach can enter your lungs causing serious complications  Food can increase nausea and vomiting  When do I need to stop eating and drinking before my surgery?      Do not eat any food after midnight the night before your surgery/procedure. You may have up to 13.5 ounces of clear liquid until TWO hours before your instructed arrival time to the hospital.  This includes water, black tea/coffee, (no milk or cream) apple juice, and electrolyte drinks (Gatorade). You may chew gum until TWO hours before your surgery/procedure            Simple  things you can do to help prevent blood clots     Blood clots are blockages that can form in the body's veins. When a blood clot forms in your deep veins, it may be called a deep vein thrombosis, or DVT for short. Blood clots can happen in any part of the body where blood flows, but they are most common in the arms and legs. If a piece of a blood clot breaks free and travels to the lungs, it is called a pulmonary embolus (PE). A PE can be a very serious problem.         Being in the hospital or having surgery can raise your chances of getting a blood clot because you may not be well enough to move around as much as you normally do.         Ways you can help prevent blood clots in the hospital       Wearing SCDs  SCDs stands for Sequential Compression Devices.   SCDs are special sleeves that wrap around your legs. They attach to a pump that fills them with air to gently squeeze your legs every few minutes.  This helps return the blood in your legs to your heart.   SCDs should only be taken off when walking or bathing. SCDs may not be comfortable, but they can help save your life.              Pump SCD leg sleeves  Wearing compression stockings - if your doctor orders them. These special snug-fitting stockings gently squeeze your legs to help blood flow.       Walking. Walking helps move the blood in your legs.   If your doctor says it is ok, try walking the halls at least   5 times a day. Ask us to help you get up, so you don't fall.      Taking any blood-thinning medicines your doctor orders.              Ways you can help prevent blood clots at home         Wearing compression stockings - if your doctor orders them.   Walking - to help move the blood in your legs.    Taking any blood-thinning medicines your doctor orders.      Signs of a blood clot or PE    Tell your doctor or nurse right away if you have any of the problems listed below.         If you are at home, seek medical care right away. Call 911 for chest  pain or problems breathing.            Signs of a blood clot (DVT) - such as pain, swelling, redness, or warmth in your arm or legs.  Signs of a pulmonary embolism (PE) - such as chest pain or feeling short of breath      Tobacco and Alcohol;  Do not drink alcohol or smoke within 24 hours of surgery.  It is best to quit smoking for as long as possible before any surgery or procedure.    Quitting Smoking; Recognizing Dangerous Situations:  1-Alcohol use during the first month after quitting, 2-Being around smoke or someone who smokes 3-Times situation routinely smoked  4-Triggers-car, breaks, coffee, when awakening, social events  Coping Skills: 1-Learning new ways to manage stress 2-Exercising 3-Relaxation breathing   4-Change routines 5-Distraction techniques    Websites:  Smoke-Free - offers free text messages and an maria a to help you quit. Info includes eating and mood issues that may come with quitting. There is a Live Helpline to talk to an expert. Go to smokefree.gov; Become an Ex-Smoker - the free EX Plan is based on scientific research and useful advice from ex-smokers. It isn't just about quitting smoking.  It's about re-learning life without cigarettes using a 3-step program.  Go to becomeanex.org   Centers for Disease Control offer many suggestions for helping you quit. Includes a Quit Guide and real-life stories. There are sections for specific groups such as LGBT, , different ethnic groups, and pregnant women.  Go to cdc.gov/tobacco/campaign/tips    Other Resources:  [place] Tobacco Quit Line - call 1-800-QUIT-NOW or 1-241.659.6225.  [facility] 2-1-1 - to find local programs and resources. Call 211 or go to 211.org.  [facility] Tobacco Cessation Program - call 355-631-0021.  American Lung Association offers classes for quitting smoking. Some places may charge a fee. For a list of classes, go to lung.org or call 1-800-LUNG-[place].     Some things to think about:; The health benefits of quitting  smoking can help most of the major parts of your body. There is no safe amount of cigarette smoke. Quitting smoking can add years to your life. When you quit, you'll also protect your loved ones from dangerous secondhand smoke. Make a plan, join a support group, and talk to your physician to assist in quitting smoking.                                                                                                              , Quitting Alcohol; Things to think about: Alcohol use disorder is a health condition that can improve with treatment. Each person is unique. A treatment that is good for one person may not be a good fit for someone else. Simply knowing the different options can be an important first step. Treatment can be inpatient, where you stay at a facility, or outpatient, where you stay at home. Your insurance plan may cover some treatment costs.   Resources:    NIAAA Alcohol Treatment Navigator - from the National [place] on Alcohol Abuse and  Alcoholism. Offers an online tool to help you find the right treatment for you - near you. Go to alcoholtreatment.niaaa.nih.gov.  [facility] National Hotline  - from the Substance Abuse and Mental Health Services Administration. A free, confidential 24-hour treatment referral and information service for anyone facing mental and/or substance use disorders. Go to findtreatment.gov or call 0-039-733-HELP (3164).    Alcoholics Anonymous (AA) - offers group meetings and a 12-step program to anyone who has a drinking problem. Go to aa.org or call 238-700-9074    [facility] 2-1-1 - to find local programs and resources. Call 211 or go to 211.org    Other people you can talk to about treatment options include your primary care doctor, health insurance plan, local health department, or employee assistance program. You can also ask to talk to a hospital .                   The Week before Surgery        Seven days before Surgery  Check your [facility] medication  instructions  Do the exercises provided to you by [facility]   Arrange for a responsible, adult licensed  to take you home after surgery and stay with you for 24 hours.  You will not be permitted to drive yourself home if you have received any anesthetic/sedation  Six days before surgery  Check your [facility] medication instructions  Do the exercises provided to you by [facility]   Start using Chlorhexidene (CHG) body wash if prescribed  Five days before surgery  Check your [facility] medication instructions  Do the exercises provided to you by [facility]   Continue to use CHG body wash if prescribed  Three days before surgery  Check your [facility] medication instructions  Do the exercises provided to you by [facility]   Continue to use CHG body wash if prescribed  Two days before surgery  Check your [facility] medication instructions  Do the exercises provided to you by [facility]   Continue to use CHG body wash if prescribed    The Day before Surgery       Check your [facility] medication and all other [facility] instructions including when to stop eating and drinking  You will be called with your arrival time for surgery in the late afternoon.  If you do not receive a call please reach out to your surgeon's office.  Do not smoke or drink 24 hours before surgery  Prepare items to bring with you to the hospital  Shower with your chlorhexidine wash if prescribed  Brush your teeth and use your chlorhexidine dental rinse if prescribed    The Day of Surgery       Check your [facility] medication instructions  Ensure you follow the instructions for when to stop eating and drinking  Shower, if prescribed use CHG.  Do not apply any lotions, creams, moisturizers, perfume or deodorant  Brush your teeth and use your CHG dental rinse if prescribed  Wear loose comfortable clothing  Avoid make-up  Remove  jewelry and piercings, consider professional piercing removal with a plastic spacer if needed  Bring photo ID and Insurance card  Bring an accurate medication list that includes medication dose,  frequency and allergies  Bring a copy of your advanced directives (will, health care power of )  Bring any devices and controllers as well as medical devices you have been provided with for surgery (CPAP, slings, braces, etc.)  Dentures, eyeglasses, and contacts will be removed before surgery, please bring cases for contacts or glasses

## 2025-03-17 NOTE — CPM/PAT H&P
"CPM/PAT Evaluation       Name: Iam Acevedo (Iam Acevedo \"Brandon\")  /Age: 1960/64 y.o.     In-Person       Chief Complaint: Left Lower Extremity Endarterectomy with Dr. Turner on 3/28    HPI  65 y/o male scheduled for Left Lower extremity Endarterectomy on 3/28 with Dr. Turner secondary.  PMHX includes CAD, HTN, PAD, and COPD.  Presents to [facility] today for perioperative risk stratification and optimization.     Past Medical History:   Diagnosis Date    Atherosclerosis of native arteries of extremities with intermittent claudication, left leg (CMS-[facility]) 2022    Atherosclerosis of native arteries of extremities with intermittent claudication, left leg    Encounter for immunization 2016    Flu vaccine need    Other acute postprocedural pain 2021    Acute post-operative pain    Personal history of nicotine dependence 2022    History of tobacco abuse    Personal history of nicotine dependence 2018    History of tobacco abuse    Personal history of other diseases of the digestive system 2021    History of right inguinal hernia    Pneumonia, unspecified organism     Atypical pneumonia    Solitary pulmonary nodule 2015    Lung nodule    Unspecified abdominal hernia without obstruction or gangrene     Hernia       Past Surgical History:   Procedure Laterality Date    HERNIA REPAIR  2021    Hernia Repair    MULTIPLE TOOTH EXTRACTIONS Bilateral 10/21/2024    OTHER SURGICAL HISTORY  10/05/2020    Vascular surgical procedure       Patient  reports being sexually active.    No family history on file.    No Known Allergies    Prior to Admission medications    Medication Sig Start Date End Date Taking? Authorizing Provider   albuterol 90 mcg/actuation inhaler Inhale 1 puff every 4 hours if needed for wheezing or shortness of breath. 24   Ángel Mccarty, APRN-CNP   amLODIPine (Norvasc) 10 mg tablet Take 1 tablet (10 mg) by mouth once daily. 24  Ángel" JAN Hedrick   atorvastatin (Lipitor) 40 mg tablet Take 1 tablet (40 mg) by mouth once daily. 12/11/24 6/9/25  JAN Neville   b complex 0.4 mg tablet Take 1 tablet by mouth once daily.    Historical Provider, MD   ferrous sulfate 325 (65 Fe) MG EC tablet Take 1 tablet (65 mg) by mouth once daily with a meal. Do not crush, chew, or split.    Historical Provider, MD   fluticasone propion-salmeteroL (Advair Diskus) 250-50 mcg/dose diskus inhaler Inhale 1 puff 2 times a day.  Patient not taking: Reported on 11/11/2024 8/25/23 11/11/24  JAN Neville   fluticasone propion-salmeteroL (Advair Diskus) 250-50 mcg/dose diskus inhaler Inhale 1 puff 2 times a day. Rinse mouth with water after use to reduce aftertaste and incidence of candidiasis. Do not swallow. 11/11/24 12/11/24  JAN Neville   fluticasone propion-salmeteroL (Advair Diskus) 250-50 mcg/dose diskus inhaler Inhale 1 puff 2 times a day. 12/11/24 6/9/25  JAN Neville   lisinopriL-hydrochlorothiazide 20-12.5 mg tablet Take 1 tablet by mouth once daily. 12/11/24 6/9/25  JAN Neville   multivitamin with minerals (multivitamin-iron-folic acid) tablet Take 1 tablet by mouth once daily.    Historical Provider, MD   nicotine (Nicoderm CQ) 14 mg/24 hr patch Place 1 patch over 24 hours on the skin once every 24 hours. 2/11/25 3/13/25  JAN Neville        PAT ROS:   Constitutional:    no fever   no chills  Neuro/Psych:    no numbness   no weakness  Eyes:    no discharge   no vision loss  Ears:    no ear pain   no hearing loss  Nose:    no nasal discharge   no sinus congestion  Mouth:    no dental issues   no mouth pain  Throat:    no throat pain   no dysphagia  Neck:    no neck pain   neck swelling  Cardio:    no chest pain   no palpitations  Respiratory:    no cough   no wheezing  Endocrine:    no cold intolerance   no heat intolerance  GI:    no diarrhea   no nausea  :    no  "difficulty urinating   no dysuria  Musculoskeletal:    no arthralgias   no myalgias  Hematologic:    no excessive bleeding   no history of blood transfusion  Skin:   no skin changes   no rash      Physical Exam  Vitals and nursing note reviewed.   Constitutional:       Appearance: Normal appearance.   HENT:      Head: Normocephalic and atraumatic.      Nose: Nose normal.      Mouth/Throat:      Mouth: Mucous membranes are moist.   Eyes:      Pupils: Pupils are equal, round, and reactive to light.   Cardiovascular:      Rate and Rhythm: Normal rate and regular rhythm.   Pulmonary:      Effort: Pulmonary effort is normal.      Breath sounds: Normal breath sounds.   Abdominal:      General: Abdomen is flat.      Palpations: Abdomen is soft.   Musculoskeletal:      Cervical back: Normal range of motion and neck supple.   Skin:     General: Skin is dry.   Neurological:      General: No focal deficit present.      Mental Status: He is alert and oriented to person, place, and time.   Psychiatric:         Mood and Affect: Mood normal.         Behavior: Behavior normal.          PAT AIRWAY:   Airway:     Mallampati::  II    TM distance::  >3 FB    Neck ROM::  Full   upper dentures and lower dentures      Testing/Diagnostic:     Patient Specialist/PCP:     Visit Vitals  /84   Pulse 100   Temp 36.9 °C (98.4 °F) (Oral)   Ht 1.702 m (5' 7\")   Wt 65.8 kg (145 lb 1.6 oz)   SpO2 97%   BMI 22.73 kg/m²   Smoking Status Some Days   BSA 1.76 m²       DASI Risk Score      Flowsheet Row Pre-Admission Testing from 3/17/2025 in [facility] Questionnaire Series Submission from 3/9/2025 in [facility] with Generic Provider Monisha   Can you take care of yourself (eat, dress, bathe, or use toilet)?  2.75 filed at 03/17/2025 1038 2.75  filed at 03/09/2025 2015   Can you walk indoors, such as around your house? 1.75 filed at 03/17/2025 1038 1.75  filed at 03/09/2025 2015   Can you walk a block or two on level ground?  " 2.75 filed at 03/17/2025 1038 2.75  filed at 03/09/2025 2015   Can you climb a flight of stairs or walk up a hill? 5.5 filed at 03/17/2025 1038 5.5  filed at 03/09/2025 2015   Can you run a short distance? 0 filed at 03/17/2025 1038 0  filed at 03/09/2025 2015   Can you do light work around the house like dusting or washing dishes? 2.7 filed at 03/17/2025 1038 2.7  filed at 03/09/2025 2015   Can you do moderate work around the house like vacuuming, sweeping floors or carrying groceries? 3.5 filed at 03/17/2025 1038 3.5  filed at 03/09/2025 2015   Can you do heavy work around the house like scrubbing floors or lifting and moving heavy furniture?  8 filed at 03/17/2025 1038 8  filed at 03/09/2025 2015   Can you do yard work like raking leaves, weeding or pushing a mower? 4.5 filed at 03/17/2025 1038 4.5  filed at 03/09/2025 2015   Can you have sexual relations? 5.25 filed at 03/17/2025 1038 5.25  filed at 03/09/2025 2015   Can you participate in moderate recreational activities like golf, bowling, dancing, doubles tennis or throwing a baseball or football? 6 filed at 03/17/2025 1038 6  filed at 03/09/2025 2015   Can you participate in strenous sports like swimming, singles tennis, football, basketball, or skiing? 0 filed at 03/17/2025 1038 0  filed at 03/09/2025 2015   DASI SCORE 42.7 filed at 03/17/2025 1038 42.7  filed at 03/09/2025 2015   METS Score (Will be calculated only when all the questions are answered) 8 filed at 03/17/2025 1038 8  filed at 03/09/2025 2015          Caprini DVT Assessment      Flowsheet Row Pre-Admission Testing from 3/17/2025 in [facility]   DVT Score (IF A SCORE IS NOT CALCULATING, MUST SELECT A BMI TO COMPLETE) 8 filed at 03/17/2025 1114   Surgical Factors Major surgery planned, lasting over 3 hours filed at 03/17/2025 1114   BMI (BMI MUST BE CHOSEN) 30 or less filed at 03/17/2025 1114          Modified Frailty Index    No data to display       [id number] Stroke Risk  Points  Current as of 55 minutes ago        N/A 0 to 9 Points:      Last Change: N/A          The [id number] risk score (Lip EBONY, et al. 2009. © 2010 American College [employer]) quantifies the risk of stroke for a patient with atrial fibrillation. For patients without atrial fibrillation or under the age of 18 this score appears as N/A. Higher score values generally indicate higher risk of stroke.        This score is not applicable to this patient. Components are not calculated.          Revised Cardiac Risk Index      Flowsheet Row Pre-Admission Testing from 3/17/2025 in [facility]   High-Risk Surgery (Intraperitoneal, Intrathoracic,Suprainguinal vascular) 0 filed at 03/17/2025 1112   History of ischemic heart disease (History of MI, History of positive exercuse test, Current chest paint considered due to myocardial ischemia, Use of nitrate therapy, ECG with pathological Q Waves) 1 filed at 03/17/2025 1112   History of congestive heart failure (pulmonary edemia, bilateral rales or S3 gallop, Paroxysmal nocturnal dyspnea, CXR showing pulmonary vascular redistribution) 0 filed at 03/17/2025 1112   History of cerebrovascular disease (Prior TIA or stroke) 0 filed at 03/17/2025 1112   Pre-operative insulin treatment 0 filed at 03/17/2025 1112   Pre-operative creatinine>2 mg/dl 0 filed at 03/17/2025 1112   Revised Cardiac Risk Calculator 1 filed at 03/17/2025 1112          Apfel Simplified Score    No data to display       Risk Analysis Index Results This Encounter    No data found in the last 10 encounters.       Stop Bang Score      Flowsheet Row Pre-Admission Testing from 3/17/2025 in [facility] Questionnaire Series Submission from 3/9/2025 in [facility] with Generic Provider Monisha   Do you snore loudly? 0 filed at 03/17/2025 1037 0  filed at 03/09/2025 2015   Do you often feel tired or fatigued after your sleep? 0 filed at 03/17/2025 1037 0  filed at 03/09/2025 2015    Has anyone ever observed you stop breathing in your sleep? 0 filed at 03/17/2025 1037 0  filed at 03/09/2025 2015   Do you have or are you being treated for high blood pressure? 1 filed at 03/17/2025 1037 1  filed at 03/09/2025 2015   Recent BMI (Calculated) 22.7 filed at 03/17/2025 1037 23.4  filed at 03/09/2025 2015   Is BMI greater than 35 kg/m2? 0=No filed at 03/17/2025 1037 0=No  filed at 03/09/2025 2015   Age older than 50 years old? 1=Yes filed at 03/17/2025 1037 1=Yes  filed at 03/09/2025 2015   Is your neck circumference greater than 17 inches (Male) or 16 inches (Female)? 0 filed at 03/17/2025 1037 --   Gender - Male 1=Yes filed at 03/17/2025 1037 1=Yes  filed at 03/09/2025 2015   STOP-BANG Total Score 3 filed at 03/17/2025 1037 --          Prodigy: High Risk  Total Score: 16              Prodigy Age Score      Prodigy Gender Score          ARISCAT Score for Postoperative Pulmonary Complications      Flowsheet Row Pre-Admission Testing from 3/17/2025 in [facility]   Age Calculated Score 3 filed at 03/17/2025 1113   Preoperative SpO2 0 filed at 03/17/2025 1113   Respiratory infection in the last month Either upper or lower (i.e., URI, bronchitis, pneumonia), with fever and antibiotic treatment 0 filed at 03/17/2025 1113   Preoperative anemia (Hgb less than 10 g/dl) 0 filed at 03/17/2025 1113   Surgical incision  0 filed at 03/17/2025 1113   Duration of surgery  16 filed at 03/17/2025 1113   Emergency Procedure  0 filed at 03/17/2025 1113   ARISCAT Total Score  19 filed at 03/17/2025 1113          [place] Perioperative Risk for Myocardial Infarction or Cardiac Arrest (LEE)      Flowsheet Row Pre-Admission Testing from 3/17/2025 in [facility]   Calculated Age Score 1.28 filed at 03/17/2025 1112   Functional Status  0 filed at 03/17/2025 1112   ASA Class  -1.92 filed at 03/17/2025 1112   Creatinine 0 filed at 03/17/2025 1112            Assessment and Plan:   No results found  for this or any previous visit (from the past 24 hours).     Assessment and Plan:    65 y/o male scheduled for Left Lower extremity Endarterectomy on 3/28 with Dr. Johnny saavedra.  PMHX includes CAD, HTN, PAD, and COPD.  Presents to [facility] today for perioperative risk stratification and optimization.     Neuro:  No neurologic diagnosis or significant findings on chart review, clinical presentation and evaluation.  No grossly apparent neurologic perioperative risk.    Patient is at increased risk for perioperative CVA secondary to  cardiac disease, HTN    HEENT:  No HEENT diagnosis or significant findings on chart review or clinical presentation and evaluation. No further preoperative testing/intervention indicated at this time.    Cardiovascular:  Pmh Of PAD, HTN and CAD      History of BL LE CLTI 3, left worse than right. He has a history of left femoropopliteal atherectomy and iliac stent by Dr. Cyr     Currently taking Lisinopril-hydrochlorothiazide, amlodipine and atorvastatin     [facility] 11/20/2017   1. Non-obstructive coronary artery disease and no evidence of significant coronary artery disease.    Cardiologist is Dr. Schuyler Giang, currently pending a stress test and ECHO. Following up with Dr. Schuyler Giang on 3/19/2025 to receive final results. Records have been requested to be faxed    UPDATE:  3/10/2025 NSR with septal infarct that was seen on previous EKG  3/13/2025 Nuclear Stress Test:   No chest discomfort or ischemic EKG changes with exercise  Normal Left ventricular systolic function  No perfusion evidence of scar or focal ischemia   4. LVEF 70%, normal regional and global function    3/12/2025 Carotid Ultrasound  Showed less than 50% occulusion of bilateral carotic arteries.     METS: 8  RCRI: 1 point, 6.0% risk for postoperative MACE   LEE: 1.28% risk for 30 day postoperative MACE      Pulmonary:  History of Extensive tobacco use and COPD currently taking Advair and  Albuterol.    Currently on Room air and saturation on 97%     Stop Bang score is 3 placing patient at intermediate risk for ARJUN  ARISCAT: <26 points, 1.6% risk of in-hospital postoperative pulmonary complication    Smoking cessation discussed with patient, patient also provided cessation education/hotline handout, Pumonary toilet education discussed, patient also provided deep breathing exercises and incentive spirometry educational handout    Renal:   No renal diagnosis, however patient is at increase risk for perioperative renal complications secondary to  Age equal to or greater than 56, HTN, PAD, COPD, use of an ace, arb, or NSAID    Endocrine:  No endocrine diagnosis or significant findings on chart review or clinical presentation and evaluation. No further testing or intervention is indicated at this time.    Hematologic:  No hematologic diagnosis, however patient is at an increased risk for DVT  Caprini Score 8, patient at High risk for perioperative DVT.  Patient provided with VTE education/handout.    Gastrointestinal:   No GI diagnosis or significant findings on chart review or clinical presentation and evaluation.   Eat-10 score 0      Infectious disease:   No infectious diagnosis or significant findings on chart review or clinical presentation and evaluation.       Musculoskeletal:   No diagnosis or significant findings on chart review or clinical presentation and evaluation.     Anesthesia/Airway:  No anesthesia complications      Medication instructions and NPO guidelines reviewed with the patient.  All questions or concerns discussed and addressed.      Patient seen and staffed with Dr. Key

## 2025-03-24 ENCOUNTER — HOSPITAL ENCOUNTER (OUTPATIENT)
Dept: RADIOLOGY | Facility: HOSPITAL | Age: 65
Discharge: HOME | End: 2025-03-24
Payer: COMMERCIAL

## 2025-03-24 DIAGNOSIS — I71.21 ANEURYSM OF ASCENDING AORTA WITHOUT RUPTURE: ICD-10-CM

## 2025-03-24 PROCEDURE — 71275 CT ANGIOGRAPHY CHEST: CPT

## 2025-03-24 PROCEDURE — 2550000001 HC RX 255 CONTRASTS: Performed by: INTERNAL MEDICINE

## 2025-03-24 RX ADMIN — IOHEXOL 75 ML: 350 INJECTION, SOLUTION INTRAVENOUS at 14:45

## 2025-03-27 NOTE — PROGRESS NOTES
"Pharmacy Medication History Review    Iam Acevedo \"Brandon\" is a 64 y.o. male who is planned to be admitted for PAD (peripheral artery disease) (CMS-[facility]). Pharmacy called the patient prior to their scheduled procedure and reviewed the patient's [id number] medications for accuracy.    Medications ADDED:  none  Medications CHANGED:  none  Medications REMOVED:   none    Please review updated prior to admission medication list and comments regarding how patient may be taking medications differently by going to Admission tab --> Admission Orders --> Admit Orders / Review prior to admission medications.     Preferred pharmacy, last doses of medications, and allergies to be confirmed with patient by nursing the day of procedure.     Sources used to complete the med history include:  [employer]  Pharmacy dispense history  Spouse Good historian  Chart Review  Care Everywhere     Below are additional concerns with the patient's PTA list.  States patient uses their albuterol inhaler as needed, not daily    Myrna Davison CPhT  Please reach out via Secure Chat for questions   "

## 2025-03-28 ENCOUNTER — ANESTHESIA (OUTPATIENT)
Dept: OPERATING ROOM | Facility: HOSPITAL | Age: 65
End: 2025-03-28
Payer: COMMERCIAL

## 2025-03-28 ENCOUNTER — APPOINTMENT (OUTPATIENT)
Dept: RADIOLOGY | Facility: HOSPITAL | Age: 65
End: 2025-03-28
Payer: COMMERCIAL

## 2025-03-28 ENCOUNTER — HOSPITAL ENCOUNTER (INPATIENT)
Facility: HOSPITAL | Age: 65
LOS: 1 days | Discharge: HOME | End: 2025-03-29
Attending: SURGERY | Admitting: SURGERY
Payer: COMMERCIAL

## 2025-03-28 DIAGNOSIS — I73.9 PAD (PERIPHERAL ARTERY DISEASE) (CMS-HCC): Primary | ICD-10-CM

## 2025-03-28 LAB
ABO GROUP (TYPE) IN BLOOD: NORMAL
ACT BLD: 149 SEC (ref 83–199)
ACT BLD: 157 SEC (ref 83–199)
ACT BLD: 237 SEC (ref 83–199)
ACT BLD: 303 SEC (ref 83–199)
ACT BLD: 326 SEC (ref 83–199)
RH FACTOR (ANTIGEN D): NORMAL

## 2025-03-28 PROCEDURE — 2500000004 HC RX 250 GENERAL PHARMACY W/ HCPCS (ALT 636 FOR OP/ED)

## 2025-03-28 PROCEDURE — A35355 PR THROMBOENDARTECTMY ILIOFEMORAL

## 2025-03-28 PROCEDURE — 3700000002 HC GENERAL ANESTHESIA TIME - EACH INCREMENTAL 1 MINUTE: Performed by: SURGERY

## 2025-03-28 PROCEDURE — 04CL0ZZ EXTIRPATION OF MATTER FROM LEFT FEMORAL ARTERY, OPEN APPROACH: ICD-10-PCS | Performed by: SURGERY

## 2025-03-28 PROCEDURE — 35355 RECHANNELING OF ARTERY: CPT | Performed by: SURGERY

## 2025-03-28 PROCEDURE — 04CJ0ZZ EXTIRPATION OF MATTER FROM LEFT EXTERNAL ILIAC ARTERY, OPEN APPROACH: ICD-10-PCS | Performed by: SURGERY

## 2025-03-28 PROCEDURE — 3600000009 HC OR TIME - EACH INCREMENTAL 1 MINUTE - PROCEDURE LEVEL FOUR: Performed by: SURGERY

## 2025-03-28 PROCEDURE — S4991 NICOTINE PATCH NONLEGEND: HCPCS | Performed by: STUDENT IN AN ORGANIZED HEALTH CARE EDUCATION/TRAINING PROGRAM

## 2025-03-28 PROCEDURE — 36415 COLL VENOUS BLD VENIPUNCTURE: CPT | Performed by: ANESTHESIOLOGY

## 2025-03-28 PROCEDURE — 2500000004 HC RX 250 GENERAL PHARMACY W/ HCPCS (ALT 636 FOR OP/ED): Mod: JZ | Performed by: ANESTHESIOLOGY

## 2025-03-28 PROCEDURE — 96372 THER/PROPH/DIAG INJ SC/IM: CPT

## 2025-03-28 PROCEDURE — 3600000004 HC OR TIME - INITIAL BASE CHARGE - PROCEDURE LEVEL FOUR: Performed by: SURGERY

## 2025-03-28 PROCEDURE — 2500000001 HC RX 250 WO HCPCS SELF ADMINISTERED DRUGS (ALT 637 FOR MEDICARE OP): Performed by: STUDENT IN AN ORGANIZED HEALTH CARE EDUCATION/TRAINING PROGRAM

## 2025-03-28 PROCEDURE — 2500000002 HC RX 250 W HCPCS SELF ADMINISTERED DRUGS (ALT 637 FOR MEDICARE OP, ALT 636 FOR OP/ED): Performed by: STUDENT IN AN ORGANIZED HEALTH CARE EDUCATION/TRAINING PROGRAM

## 2025-03-28 PROCEDURE — C1762 CONN TISS, HUMAN(INC FASCIA): HCPCS | Performed by: SURGERY

## 2025-03-28 PROCEDURE — 2500000004 HC RX 250 GENERAL PHARMACY W/ HCPCS (ALT 636 FOR OP/ED): Performed by: STUDENT IN AN ORGANIZED HEALTH CARE EDUCATION/TRAINING PROGRAM

## 2025-03-28 PROCEDURE — A35355 PR THROMBOENDARTECTMY ILIOFEMORAL: Performed by: ANESTHESIOLOGY

## 2025-03-28 PROCEDURE — C1769 GUIDE WIRE: HCPCS | Performed by: SURGERY

## 2025-03-28 PROCEDURE — 2720000007 HC OR 272 NO HCPCS: Performed by: SURGERY

## 2025-03-28 PROCEDURE — 2060000001 HC INTERMEDIATE ICU ROOM DAILY

## 2025-03-28 PROCEDURE — B41G1ZZ FLUOROSCOPY OF LEFT LOWER EXTREMITY ARTERIES USING LOW OSMOLAR CONTRAST: ICD-10-PCS | Performed by: SURGERY

## 2025-03-28 PROCEDURE — 04UJ0KZ SUPPLEMENT LEFT EXTERNAL ILIAC ARTERY WITH NONAUTOLOGOUS TISSUE SUBSTITUTE, OPEN APPROACH: ICD-10-PCS | Performed by: SURGERY

## 2025-03-28 PROCEDURE — 3700000001 HC GENERAL ANESTHESIA TIME - INITIAL BASE CHARGE: Performed by: SURGERY

## 2025-03-28 PROCEDURE — 7100000001 HC RECOVERY ROOM TIME - INITIAL BASE CHARGE: Performed by: SURGERY

## 2025-03-28 PROCEDURE — 36620 INSERTION CATHETER ARTERY: CPT | Performed by: ANESTHESIOLOGY

## 2025-03-28 PROCEDURE — 7100000002 HC RECOVERY ROOM TIME - EACH INCREMENTAL 1 MINUTE: Performed by: SURGERY

## 2025-03-28 PROCEDURE — 2780000003 HC OR 278 NO HCPCS: Performed by: SURGERY

## 2025-03-28 PROCEDURE — 85347 COAGULATION TIME ACTIVATED: CPT

## 2025-03-28 PROCEDURE — 2500000005 HC RX 250 GENERAL PHARMACY W/O HCPCS

## 2025-03-28 PROCEDURE — 04UL0KZ SUPPLEMENT LEFT FEMORAL ARTERY WITH NONAUTOLOGOUS TISSUE SUBSTITUTE, OPEN APPROACH: ICD-10-PCS | Performed by: SURGERY

## 2025-03-28 DEVICE — XENOSURE BIOLOGIC PATCH, 0.8CM X 8CM, EIFU
Type: IMPLANTABLE DEVICE | Site: ARTERIAL | Status: FUNCTIONAL
Brand: XENOSURE BIOLOGIC PATCH

## 2025-03-28 RX ORDER — SODIUM CHLORIDE, SODIUM LACTATE, POTASSIUM CHLORIDE, CALCIUM CHLORIDE 600; 310; 30; 20 MG/100ML; MG/100ML; MG/100ML; MG/100ML
100 INJECTION, SOLUTION INTRAVENOUS CONTINUOUS
Status: DISCONTINUED | OUTPATIENT
Start: 2025-03-28 | End: 2025-03-28 | Stop reason: HOSPADM

## 2025-03-28 RX ORDER — ONDANSETRON 4 MG/1
4 TABLET, ORALLY DISINTEGRATING ORAL EVERY 8 HOURS PRN
Status: DISCONTINUED | OUTPATIENT
Start: 2025-03-28 | End: 2025-03-29 | Stop reason: HOSPADM

## 2025-03-28 RX ORDER — OXYCODONE HYDROCHLORIDE 5 MG/1
5 TABLET ORAL EVERY 4 HOURS PRN
Status: DISCONTINUED | OUTPATIENT
Start: 2025-03-28 | End: 2025-03-28 | Stop reason: HOSPADM

## 2025-03-28 RX ORDER — OXYCODONE HYDROCHLORIDE 5 MG/1
10 TABLET ORAL EVERY 4 HOURS PRN
Status: DISCONTINUED | OUTPATIENT
Start: 2025-03-28 | End: 2025-03-29 | Stop reason: HOSPADM

## 2025-03-28 RX ORDER — FENTANYL CITRATE 50 UG/ML
INJECTION, SOLUTION INTRAMUSCULAR; INTRAVENOUS AS NEEDED
Status: DISCONTINUED | OUTPATIENT
Start: 2025-03-28 | End: 2025-03-28

## 2025-03-28 RX ORDER — AMLODIPINE BESYLATE 10 MG/1
10 TABLET ORAL DAILY
Status: DISCONTINUED | OUTPATIENT
Start: 2025-03-28 | End: 2025-03-29 | Stop reason: HOSPADM

## 2025-03-28 RX ORDER — ONDANSETRON HYDROCHLORIDE 2 MG/ML
4 INJECTION, SOLUTION INTRAVENOUS EVERY 8 HOURS PRN
Status: DISCONTINUED | OUTPATIENT
Start: 2025-03-28 | End: 2025-03-29 | Stop reason: HOSPADM

## 2025-03-28 RX ORDER — FLUTICASONE FUROATE AND VILANTEROL 200; 25 UG/1; UG/1
1 POWDER RESPIRATORY (INHALATION)
Status: DISCONTINUED | OUTPATIENT
Start: 2025-03-28 | End: 2025-03-28 | Stop reason: SDUPTHER

## 2025-03-28 RX ORDER — ONDANSETRON HYDROCHLORIDE 2 MG/ML
4 INJECTION, SOLUTION INTRAVENOUS ONCE AS NEEDED
Status: DISCONTINUED | OUTPATIENT
Start: 2025-03-28 | End: 2025-03-28 | Stop reason: HOSPADM

## 2025-03-28 RX ORDER — CEFAZOLIN 1 G/1
INJECTION, POWDER, FOR SOLUTION INTRAVENOUS AS NEEDED
Status: DISCONTINUED | OUTPATIENT
Start: 2025-03-28 | End: 2025-03-28

## 2025-03-28 RX ORDER — METOPROLOL TARTRATE 1 MG/ML
INJECTION, SOLUTION INTRAVENOUS AS NEEDED
Status: DISCONTINUED | OUTPATIENT
Start: 2025-03-28 | End: 2025-03-28

## 2025-03-28 RX ORDER — ONDANSETRON HYDROCHLORIDE 2 MG/ML
INJECTION, SOLUTION INTRAVENOUS AS NEEDED
Status: DISCONTINUED | OUTPATIENT
Start: 2025-03-28 | End: 2025-03-28

## 2025-03-28 RX ORDER — MEPERIDINE HYDROCHLORIDE 25 MG/ML
12.5 INJECTION INTRAMUSCULAR; INTRAVENOUS; SUBCUTANEOUS EVERY 10 MIN PRN
Status: DISCONTINUED | OUTPATIENT
Start: 2025-03-28 | End: 2025-03-28 | Stop reason: HOSPADM

## 2025-03-28 RX ORDER — ACETAMINOPHEN 325 MG/1
650 TABLET ORAL EVERY 6 HOURS SCHEDULED
Status: DISCONTINUED | OUTPATIENT
Start: 2025-03-28 | End: 2025-03-29 | Stop reason: HOSPADM

## 2025-03-28 RX ORDER — HYDROMORPHONE HYDROCHLORIDE 0.2 MG/ML
0.2 INJECTION INTRAMUSCULAR; INTRAVENOUS; SUBCUTANEOUS EVERY 5 MIN PRN
Status: DISCONTINUED | OUTPATIENT
Start: 2025-03-28 | End: 2025-03-28 | Stop reason: HOSPADM

## 2025-03-28 RX ORDER — PHENYLEPHRINE HYDROCHLORIDE 10 MG/ML
INJECTION INTRAVENOUS AS NEEDED
Status: DISCONTINUED | OUTPATIENT
Start: 2025-03-28 | End: 2025-03-28

## 2025-03-28 RX ORDER — PROTAMINE SULFATE 10 MG/ML
INJECTION, SOLUTION INTRAVENOUS AS NEEDED
Status: DISCONTINUED | OUTPATIENT
Start: 2025-03-28 | End: 2025-03-28

## 2025-03-28 RX ORDER — LIDOCAINE HCL/PF 100 MG/5ML
SYRINGE (ML) INTRAVENOUS AS NEEDED
Status: DISCONTINUED | OUTPATIENT
Start: 2025-03-28 | End: 2025-03-28

## 2025-03-28 RX ORDER — NORETHINDRONE AND ETHINYL ESTRADIOL 0.5-0.035
KIT ORAL AS NEEDED
Status: DISCONTINUED | OUTPATIENT
Start: 2025-03-28 | End: 2025-03-28

## 2025-03-28 RX ORDER — SODIUM CHLORIDE, SODIUM LACTATE, POTASSIUM CHLORIDE, CALCIUM CHLORIDE 600; 310; 30; 20 MG/100ML; MG/100ML; MG/100ML; MG/100ML
INJECTION, SOLUTION INTRAVENOUS CONTINUOUS PRN
Status: DISCONTINUED | OUTPATIENT
Start: 2025-03-28 | End: 2025-03-28

## 2025-03-28 RX ORDER — CEFAZOLIN SODIUM 2 G/100ML
2 INJECTION, SOLUTION INTRAVENOUS EVERY 8 HOURS
Status: COMPLETED | OUTPATIENT
Start: 2025-03-28 | End: 2025-03-29

## 2025-03-28 RX ORDER — MIDAZOLAM HYDROCHLORIDE 1 MG/ML
INJECTION INTRAMUSCULAR; INTRAVENOUS AS NEEDED
Status: DISCONTINUED | OUTPATIENT
Start: 2025-03-28 | End: 2025-03-28

## 2025-03-28 RX ORDER — HEPARIN SODIUM 5000 [USP'U]/ML
5000 INJECTION, SOLUTION INTRAVENOUS; SUBCUTANEOUS EVERY 8 HOURS
Status: DISCONTINUED | OUTPATIENT
Start: 2025-03-28 | End: 2025-03-29 | Stop reason: HOSPADM

## 2025-03-28 RX ORDER — ATORVASTATIN CALCIUM 40 MG/1
40 TABLET, FILM COATED ORAL DAILY
Status: DISCONTINUED | OUTPATIENT
Start: 2025-03-28 | End: 2025-03-29 | Stop reason: HOSPADM

## 2025-03-28 RX ORDER — OXYCODONE HYDROCHLORIDE 5 MG/1
5 TABLET ORAL EVERY 6 HOURS PRN
Status: DISCONTINUED | OUTPATIENT
Start: 2025-03-28 | End: 2025-03-29 | Stop reason: HOSPADM

## 2025-03-28 RX ORDER — LIDOCAINE HYDROCHLORIDE 10 MG/ML
0.1 INJECTION, SOLUTION INFILTRATION; PERINEURAL ONCE
Status: DISCONTINUED | OUTPATIENT
Start: 2025-03-28 | End: 2025-03-28 | Stop reason: HOSPADM

## 2025-03-28 RX ORDER — NALOXONE HYDROCHLORIDE 0.4 MG/ML
0.2 INJECTION, SOLUTION INTRAMUSCULAR; INTRAVENOUS; SUBCUTANEOUS EVERY 5 MIN PRN
Status: DISCONTINUED | OUTPATIENT
Start: 2025-03-28 | End: 2025-03-29 | Stop reason: HOSPADM

## 2025-03-28 RX ORDER — HEPARIN SODIUM 1000 [USP'U]/ML
INJECTION, SOLUTION INTRAVENOUS; SUBCUTANEOUS AS NEEDED
Status: DISCONTINUED | OUTPATIENT
Start: 2025-03-28 | End: 2025-03-28

## 2025-03-28 RX ORDER — IBUPROFEN 200 MG
1 TABLET ORAL EVERY 24 HOURS
Status: DISCONTINUED | OUTPATIENT
Start: 2025-03-28 | End: 2025-03-29 | Stop reason: HOSPADM

## 2025-03-28 RX ORDER — FLUTICASONE FUROATE AND VILANTEROL 200; 25 UG/1; UG/1
1 POWDER RESPIRATORY (INHALATION)
Status: DISCONTINUED | OUTPATIENT
Start: 2025-03-29 | End: 2025-03-29 | Stop reason: HOSPADM

## 2025-03-28 RX ORDER — ROCURONIUM BROMIDE 10 MG/ML
INJECTION, SOLUTION INTRAVENOUS AS NEEDED
Status: DISCONTINUED | OUTPATIENT
Start: 2025-03-28 | End: 2025-03-28

## 2025-03-28 RX ORDER — PROPOFOL 10 MG/ML
INJECTION, EMULSION INTRAVENOUS AS NEEDED
Status: DISCONTINUED | OUTPATIENT
Start: 2025-03-28 | End: 2025-03-28

## 2025-03-28 RX ORDER — ASPIRIN 81 MG/1
81 TABLET ORAL DAILY
Status: DISCONTINUED | OUTPATIENT
Start: 2025-03-29 | End: 2025-03-29 | Stop reason: HOSPADM

## 2025-03-28 RX ORDER — DROPERIDOL 2.5 MG/ML
0.62 INJECTION, SOLUTION INTRAMUSCULAR; INTRAVENOUS ONCE AS NEEDED
Status: DISCONTINUED | OUTPATIENT
Start: 2025-03-28 | End: 2025-03-28 | Stop reason: HOSPADM

## 2025-03-28 RX ORDER — ALBUTEROL SULFATE 90 UG/1
1 INHALANT RESPIRATORY (INHALATION) EVERY 4 HOURS PRN
Status: DISCONTINUED | OUTPATIENT
Start: 2025-03-28 | End: 2025-03-29 | Stop reason: HOSPADM

## 2025-03-28 RX ADMIN — PHENYLEPHRINE HYDROCHLORIDE 80 MCG: 10 INJECTION INTRAVENOUS at 10:50

## 2025-03-28 RX ADMIN — NICOTINE 1 PATCH: 14 PATCH, EXTENDED RELEASE TRANSDERMAL at 19:00

## 2025-03-28 RX ADMIN — ROCURONIUM 50 MG: 50 INJECTION, SOLUTION INTRAVENOUS at 10:19

## 2025-03-28 RX ADMIN — HEPARIN SODIUM 2000 UNITS: 1000 INJECTION, SOLUTION INTRAVENOUS; SUBCUTANEOUS at 12:22

## 2025-03-28 RX ADMIN — ROCURONIUM 20 MG: 50 INJECTION, SOLUTION INTRAVENOUS at 11:25

## 2025-03-28 RX ADMIN — OXYCODONE HYDROCHLORIDE 5 MG: 5 TABLET ORAL at 21:07

## 2025-03-28 RX ADMIN — DEXAMETHASONE SODIUM PHOSPHATE 4 MG: 4 INJECTION INTRA-ARTICULAR; INTRALESIONAL; INTRAMUSCULAR; INTRAVENOUS; SOFT TISSUE at 10:41

## 2025-03-28 RX ADMIN — MIDAZOLAM HYDROCHLORIDE 2 MG: 2 INJECTION, SOLUTION INTRAMUSCULAR; INTRAVENOUS at 10:12

## 2025-03-28 RX ADMIN — METOPROLOL TARTRATE 5 MG: 5 INJECTION, SOLUTION INTRAVENOUS at 10:18

## 2025-03-28 RX ADMIN — PROTAMINE SULFATE 40 MG: 10 INJECTION, SOLUTION INTRAVENOUS at 12:41

## 2025-03-28 RX ADMIN — SODIUM CHLORIDE, POTASSIUM CHLORIDE, SODIUM LACTATE AND CALCIUM CHLORIDE: 600; 310; 30; 20 INJECTION, SOLUTION INTRAVENOUS at 10:05

## 2025-03-28 RX ADMIN — PROPOFOL 100 MG: 10 INJECTION, EMULSION INTRAVENOUS at 10:18

## 2025-03-28 RX ADMIN — PROPOFOL 20 MG: 10 INJECTION, EMULSION INTRAVENOUS at 11:25

## 2025-03-28 RX ADMIN — PHENYLEPHRINE HYDROCHLORIDE 80 MCG: 10 INJECTION INTRAVENOUS at 10:57

## 2025-03-28 RX ADMIN — ONDANSETRON 4 MG: 2 INJECTION INTRAMUSCULAR; INTRAVENOUS at 12:56

## 2025-03-28 RX ADMIN — ROCURONIUM 20 MG: 50 INJECTION, SOLUTION INTRAVENOUS at 10:53

## 2025-03-28 RX ADMIN — FENTANYL CITRATE 50 MCG: 50 INJECTION, SOLUTION INTRAMUSCULAR; INTRAVENOUS at 10:44

## 2025-03-28 RX ADMIN — SODIUM CHLORIDE, SODIUM LACTATE, POTASSIUM CHLORIDE, AND CALCIUM CHLORIDE: 600; 310; 30; 20 INJECTION, SOLUTION INTRAVENOUS at 10:45

## 2025-03-28 RX ADMIN — SUGAMMADEX 200 MG: 100 INJECTION, SOLUTION INTRAVENOUS at 13:14

## 2025-03-28 RX ADMIN — EPHEDRINE SULFATE 5 MG: 50 INJECTION, SOLUTION INTRAVENOUS at 11:10

## 2025-03-28 RX ADMIN — HYDROMORPHONE HYDROCHLORIDE 0.2 MG: 0.2 INJECTION, SOLUTION INTRAMUSCULAR; INTRAVENOUS; SUBCUTANEOUS at 14:37

## 2025-03-28 RX ADMIN — ACETAMINOPHEN 650 MG: 325 TABLET ORAL at 19:00

## 2025-03-28 RX ADMIN — PHENYLEPHRINE HYDROCHLORIDE 80 MCG: 10 INJECTION INTRAVENOUS at 10:53

## 2025-03-28 RX ADMIN — FENTANYL CITRATE 50 MCG: 50 INJECTION, SOLUTION INTRAMUSCULAR; INTRAVENOUS at 13:07

## 2025-03-28 RX ADMIN — LIDOCAINE HYDROCHLORIDE 100 MG: 20 INJECTION INTRAVENOUS at 10:18

## 2025-03-28 RX ADMIN — ROCURONIUM 20 MG: 50 INJECTION, SOLUTION INTRAVENOUS at 12:33

## 2025-03-28 RX ADMIN — PHENYLEPHRINE HYDROCHLORIDE 80 MCG: 10 INJECTION INTRAVENOUS at 11:07

## 2025-03-28 RX ADMIN — HEPARIN SODIUM 5000 UNITS: 5000 INJECTION, SOLUTION INTRAVENOUS; SUBCUTANEOUS at 19:00

## 2025-03-28 RX ADMIN — HYDROMORPHONE HYDROCHLORIDE 0.2 MG: 0.2 INJECTION, SOLUTION INTRAMUSCULAR; INTRAVENOUS; SUBCUTANEOUS at 14:21

## 2025-03-28 RX ADMIN — PHENYLEPHRINE HYDROCHLORIDE 80 MCG: 10 INJECTION INTRAVENOUS at 11:00

## 2025-03-28 RX ADMIN — PHENYLEPHRINE HYDROCHLORIDE 120 MCG: 10 INJECTION INTRAVENOUS at 11:34

## 2025-03-28 RX ADMIN — HEPARIN SODIUM 7000 UNITS: 1000 INJECTION, SOLUTION INTRAVENOUS; SUBCUTANEOUS at 11:03

## 2025-03-28 RX ADMIN — HYDROMORPHONE HYDROCHLORIDE 0.5 MG: 1 INJECTION, SOLUTION INTRAMUSCULAR; INTRAVENOUS; SUBCUTANEOUS at 13:35

## 2025-03-28 RX ADMIN — ROCURONIUM 20 MG: 50 INJECTION, SOLUTION INTRAVENOUS at 11:55

## 2025-03-28 RX ADMIN — PHENYLEPHRINE HYDROCHLORIDE 80 MCG: 10 INJECTION INTRAVENOUS at 12:41

## 2025-03-28 RX ADMIN — ATORVASTATIN CALCIUM 40 MG: 40 TABLET, FILM COATED ORAL at 21:07

## 2025-03-28 RX ADMIN — HYDROMORPHONE HYDROCHLORIDE 0.5 MG: 1 INJECTION, SOLUTION INTRAMUSCULAR; INTRAVENOUS; SUBCUTANEOUS at 13:46

## 2025-03-28 RX ADMIN — ROCURONIUM 10 MG: 50 INJECTION, SOLUTION INTRAVENOUS at 12:51

## 2025-03-28 RX ADMIN — CEFAZOLIN SODIUM 2 G: 2 INJECTION, SOLUTION INTRAVENOUS at 19:00

## 2025-03-28 RX ADMIN — CEFAZOLIN 2 G: 330 INJECTION, POWDER, FOR SOLUTION INTRAMUSCULAR; INTRAVENOUS at 10:40

## 2025-03-28 SDOH — ECONOMIC STABILITY: HOUSING INSECURITY: AT ANY TIME IN THE PAST 12 MONTHS, WERE YOU HOMELESS OR LIVING IN A SHELTER (INCLUDING NOW)?: NO

## 2025-03-28 SDOH — SOCIAL STABILITY: SOCIAL INSECURITY: HAVE YOU HAD ANY THOUGHTS OF HARMING ANYONE ELSE?: NO

## 2025-03-28 SDOH — SOCIAL STABILITY: SOCIAL INSECURITY
WITHIN THE LAST YEAR, HAVE YOU BEEN RAPED OR FORCED TO HAVE ANY KIND OF SEXUAL ACTIVITY BY YOUR PARTNER OR EX-PARTNER?: NO

## 2025-03-28 SDOH — SOCIAL STABILITY: SOCIAL INSECURITY: WITHIN THE LAST YEAR, HAVE YOU BEEN HUMILIATED OR EMOTIONALLY ABUSED IN OTHER WAYS BY YOUR PARTNER OR EX-PARTNER?: NO

## 2025-03-28 SDOH — ECONOMIC STABILITY: HOUSING INSECURITY: IN THE LAST 12 MONTHS, WAS THERE A TIME WHEN YOU WERE NOT ABLE TO PAY THE MORTGAGE OR RENT ON TIME?: NO

## 2025-03-28 SDOH — SOCIAL STABILITY: SOCIAL INSECURITY: DO YOU FEEL ANYONE HAS EXPLOITED OR TAKEN ADVANTAGE OF YOU FINANCIALLY OR OF YOUR PERSONAL PROPERTY?: NO

## 2025-03-28 SDOH — SOCIAL STABILITY: SOCIAL INSECURITY: ABUSE: ADULT

## 2025-03-28 SDOH — SOCIAL STABILITY: SOCIAL INSECURITY: DOES ANYONE TRY TO KEEP YOU FROM HAVING/CONTACTING OTHER FRIENDS OR DOING THINGS OUTSIDE YOUR HOME?: NO

## 2025-03-28 SDOH — ECONOMIC STABILITY: FOOD INSECURITY: HOW HARD IS IT FOR YOU TO PAY FOR THE VERY BASICS LIKE FOOD, HOUSING, MEDICAL CARE, AND HEATING?: NOT VERY HARD

## 2025-03-28 SDOH — ECONOMIC STABILITY: FOOD INSECURITY: WITHIN THE PAST 12 MONTHS, THE FOOD YOU BOUGHT JUST DIDN'T LAST AND YOU DIDN'T HAVE MONEY TO GET MORE.: NEVER TRUE

## 2025-03-28 SDOH — SOCIAL STABILITY: SOCIAL INSECURITY
WITHIN THE LAST YEAR, HAVE YOU BEEN KICKED, HIT, SLAPPED, OR OTHERWISE PHYSICALLY HURT BY YOUR PARTNER OR EX-PARTNER?: NO

## 2025-03-28 SDOH — SOCIAL STABILITY: SOCIAL INSECURITY: DO YOU FEEL UNSAFE GOING BACK TO THE PLACE WHERE YOU ARE LIVING?: NO

## 2025-03-28 SDOH — ECONOMIC STABILITY: TRANSPORTATION INSECURITY: IN THE PAST 12 MONTHS, HAS LACK OF TRANSPORTATION KEPT YOU FROM MEDICAL APPOINTMENTS OR FROM GETTING MEDICATIONS?: NO

## 2025-03-28 SDOH — SOCIAL STABILITY: SOCIAL INSECURITY: ARE YOU OR HAVE YOU BEEN THREATENED OR ABUSED PHYSICALLY, EMOTIONALLY, OR SEXUALLY BY ANYONE?: NO

## 2025-03-28 SDOH — SOCIAL STABILITY: SOCIAL INSECURITY: HAVE YOU HAD THOUGHTS OF HARMING ANYONE ELSE?: NO

## 2025-03-28 SDOH — SOCIAL STABILITY: SOCIAL INSECURITY: WITHIN THE LAST YEAR, HAVE YOU BEEN AFRAID OF YOUR PARTNER OR EX-PARTNER?: NO

## 2025-03-28 SDOH — SOCIAL STABILITY: SOCIAL INSECURITY: ARE THERE ANY APPARENT SIGNS OF INJURIES/BEHAVIORS THAT COULD BE RELATED TO ABUSE/NEGLECT?: NO

## 2025-03-28 SDOH — ECONOMIC STABILITY: HOUSING INSECURITY: DO YOU FEEL UNSAFE GOING BACK TO THE PLACE WHERE YOU LIVE?: NO

## 2025-03-28 SDOH — ECONOMIC STABILITY: INCOME INSECURITY: IN THE PAST 12 MONTHS HAS THE ELECTRIC, GAS, OIL, OR WATER COMPANY THREATENED TO SHUT OFF SERVICES IN YOUR HOME?: NO

## 2025-03-28 SDOH — ECONOMIC STABILITY: HOUSING INSECURITY: IN THE PAST 12 MONTHS, HOW MANY TIMES HAVE YOU MOVED WHERE YOU WERE LIVING?: 0

## 2025-03-28 SDOH — ECONOMIC STABILITY: FOOD INSECURITY: WITHIN THE PAST 12 MONTHS, YOU WORRIED THAT YOUR FOOD WOULD RUN OUT BEFORE YOU GOT THE MONEY TO BUY MORE.: NEVER TRUE

## 2025-03-28 SDOH — SOCIAL STABILITY: SOCIAL INSECURITY: HAS ANYONE EVER THREATENED TO HURT YOUR FAMILY OR YOUR PETS?: NO

## 2025-03-28 SDOH — SOCIAL STABILITY: SOCIAL INSECURITY: WERE YOU ABLE TO COMPLETE ALL THE BEHAVIORAL HEALTH SCREENINGS?: YES

## 2025-03-28 ASSESSMENT — PAIN SCALES - GENERAL
PAINLEVEL_OUTOF10: 7
PAINLEVEL_OUTOF10: 6
PAINLEVEL_OUTOF10: 6
PAINLEVEL_OUTOF10: 5 - MODERATE PAIN
PAINLEVEL_OUTOF10: 6
PAINLEVEL_OUTOF10: 5 - MODERATE PAIN
PAINLEVEL_OUTOF10: 5 - MODERATE PAIN
PAINLEVEL_OUTOF10: 0 - NO PAIN
PAINLEVEL_OUTOF10: 6
PAINLEVEL_OUTOF10: 5 - MODERATE PAIN
PAINLEVEL_OUTOF10: 7
PAINLEVEL_OUTOF10: 8
PAINLEVEL_OUTOF10: 8
PAINLEVEL_OUTOF10: 6
PAINLEVEL_OUTOF10: 8

## 2025-03-28 ASSESSMENT — ACTIVITIES OF DAILY LIVING (ADL)
TOILETING: INDEPENDENT
ADEQUATE_TO_COMPLETE_ADL: YES
GROOMING: INDEPENDENT
HEARING - LEFT EAR: FUNCTIONAL
BATHING: INDEPENDENT
DRESSING YOURSELF: INDEPENDENT
HEARING - RIGHT EAR: FUNCTIONAL
WALKS IN HOME: INDEPENDENT
FEEDING YOURSELF: INDEPENDENT
PATIENT'S MEMORY ADEQUATE TO SAFELY COMPLETE DAILY ACTIVITIES?: YES
JUDGMENT_ADEQUATE_SAFELY_COMPLETE_DAILY_ACTIVITIES: YES
LACK_OF_TRANSPORTATION: NO

## 2025-03-28 ASSESSMENT — PATIENT HEALTH QUESTIONNAIRE - PHQ9
2. FEELING DOWN, DEPRESSED OR HOPELESS: NOT AT ALL
SUM OF ALL RESPONSES TO PHQ9 QUESTIONS 1 & 2: 0
1. LITTLE INTEREST OR PLEASURE IN DOING THINGS: NOT AT ALL

## 2025-03-28 ASSESSMENT — COGNITIVE AND FUNCTIONAL STATUS - GENERAL
DAILY ACTIVITIY SCORE: 24
MOBILITY SCORE: 24
PATIENT BASELINE BEDBOUND: NO

## 2025-03-28 ASSESSMENT — LIFESTYLE VARIABLES
AUDIT-C TOTAL SCORE: 0
AUDIT-C TOTAL SCORE: 0
HOW MANY STANDARD DRINKS CONTAINING ALCOHOL DO YOU HAVE ON A TYPICAL DAY: PATIENT DOES NOT DRINK
SKIP TO QUESTIONS 9-10: 1
HOW OFTEN DO YOU HAVE 6 OR MORE DRINKS ON ONE OCCASION: NEVER
HOW OFTEN DO YOU HAVE A DRINK CONTAINING ALCOHOL: NEVER

## 2025-03-28 ASSESSMENT — COLUMBIA-SUICIDE SEVERITY RATING SCALE - C-SSRS
1. IN THE PAST MONTH, HAVE YOU WISHED YOU WERE DEAD OR WISHED YOU COULD GO TO SLEEP AND NOT WAKE UP?: NO
6. HAVE YOU EVER DONE ANYTHING, STARTED TO DO ANYTHING, OR PREPARED TO DO ANYTHING TO END YOUR LIFE?: NO
2. HAVE YOU ACTUALLY HAD ANY THOUGHTS OF KILLING YOURSELF?: NO

## 2025-03-28 ASSESSMENT — PAIN DESCRIPTION - ORIENTATION: ORIENTATION: LEFT

## 2025-03-28 ASSESSMENT — PAIN DESCRIPTION - LOCATION: LOCATION: GROIN

## 2025-03-28 ASSESSMENT — PAIN - FUNCTIONAL ASSESSMENT
PAIN_FUNCTIONAL_ASSESSMENT: 0-10
PAIN_FUNCTIONAL_ASSESSMENT: 0-10

## 2025-03-28 NOTE — BRIEF OP NOTE
"Date: 3/28/2025  OR Location: [facility] OR    Name: Iam Acevedo \"Brandon\", : 1960, Age: 64 y.o., MRN: [id number], Sex: male    Diagnosis  Pre-op Diagnosis      * PAD (peripheral artery disease) (CMS-HCC) [I73.9] Post-op Diagnosis     * PAD (peripheral artery disease) (CMS-HCC) [I73.9]     Procedures  ENDARTERECTOMY, LOWER EXTREMITY  03547 - OH TEAEC W/WO PATCH GRAFT COMMON FEMORAL    ANGIOGRAM, LOWER EXTREMITY  34525 - CHG ANGIOGRAPHY EXTREMITY UNILATERAL RS&I    Left femoral endarterectomy with bovine patch angioplasty, left lower extremity completion angiogram    Surgeons      * Mabel Turner - Primary    Resident/Fellow/Other Assistant:  Surgeons and Role:     * Myrna Deshpande MD - Fellow    Staff:   Circulator: Preston  Scrub Person: Sarah  Scrub Person: Leia  Circulator: Miriam  Relief Circulator: Mago  Relief Scrub: Mago    Anesthesia Staff: Anesthesiologist: Jonathan Rizzo MD  CRNA: Hasmukh Reich APRN-CRNA; KAILEY Skinner-CRNA  SRNA: Milagro Mancia RN    Procedure Summary  Anesthesia: General  ASA: III  Estimated Blood Loss: 100mL  Intra-op Medications:   Administrations occurring from 1005 to 1605 on 25:   Medication Name Total Dose   ceFAZolin (Ancef) 1 gram/ 3 mL injection - reconstituted 330 mg/mL 2 g   dexAMETHasone (Decadron) injection 4 mg/mL 4 mg   ePHEDrine injection 5 mg   fentaNYL (Sublimaze) injection 50 mcg/mL 100 mcg   heparin injection 1,000 units/mL 9,000 Units   LR bolus Cannot be calculated   LR infusion Cannot be calculated   lidocaine (cardiac) injection 2% prefilled syringe 100 mg   metoprolol 5 mg/5 mL 5 mg   midazolam PF (Versed) injection 1 mg/mL 2 mg   ondansetron (Zofran) 2 mg/mL injection 4 mg   phenylephrine (Derrek-Synephrine) injection 600 mcg   propofol (Diprivan) injection 10 mg/mL 120 mg   protamine injection 40 mg   rocuronium (ZeMuron) 50 mg/5 mL injection 140 mg   sugammadex (Bridion) 200 mg/2 mL injection 200 mg              Anesthesia Record "               Intraprocedure I/O Totals          Intake    LR bolus 1000.00 mL    LR infusion 100.00 mL    Total Intake 1100 mL       Output    Urine 200 mL    Total Output 200 mL       Net    Net Volume 900 mL          Specimen:   ID Type Source Tests Collected by Time   1 : LEFT FEMORAL PLAQUE Tissue PLAQUE SURGICAL PATHOLOGY EXAM Mabel Turner MD 3/28/2025 5829                  Findings: heavy calcification of left common femoral, angiogram with occlusion of popliteal artery and distal reconstitution with two vessel runoff to the foot    Complications:  None; patient tolerated the procedure well.     Disposition: PACU - hemodynamically stable.  Condition: stable  Specimens Collected:   ID Type Source Tests Collected by Time   1 : LEFT FEMORAL PLAQUE Tissue PLAQUE SURGICAL PATHOLOGY EXAM Mabel Turner MD 3/28/2025 1129     Attending Attestation:     Mabel Turner  Phone Number: [phone]

## 2025-03-28 NOTE — ANESTHESIA PREPROCEDURE EVALUATION
"Patient: Iam Acevedo \"Brandon\"    Procedure Information       Date/Time: 03/28/25 1005    Procedures:       ENDARTERECTOMY, LOWER EXTREMITY (Left)      ANGIOGRAM, LOWER EXTREMITY (Left)    Location: [facility] OR 17 / [facility] OR    Surgeons: Mabel Turner MD            Relevant Problems   Cardiac   (+) CAD (coronary artery disease)   (+) Hypertension   (+) PAD (peripheral artery disease) (CMS-HCC)      Pulmonary   (+) COPD (chronic obstructive pulmonary disease) (Multi)      Musculoskeletal   (+) Primary osteoarthritis of left wrist       Clinical information reviewed:     Meds               NPO Detail:  No data recorded     PHYSICAL EXAM    Anesthesia Plan    History of general anesthesia?: yes  History of complications of general anesthesia?: no    ASA 3     general     intravenous induction   Postoperative administration of opioids is intended.  Trial extubation is planned.      "

## 2025-03-28 NOTE — ANESTHESIA PROCEDURE NOTES
Airway  Date/Time: 3/28/2025 10:25 AM  Urgency: elective    Airway not difficult    Staffing  Performed: [facility]   Authorized by: Jonathan Rizzo MD    Performed by: Milagro Mancia RN  Patient location during procedure: OR    Indications and Patient Condition  Indications for airway management: anesthesia  Spontaneous Ventilation: absent  Sedation level: deep  Preoxygenated: yes  Patient position: sniffing  Mask difficulty assessment: 1 - vent by mask  Planned trial extubation    Final Airway Details  Final airway type: endotracheal airway      Successful airway: ETT  Cuffed: yes   Successful intubation technique: direct laryngoscopy  Facilitating devices/methods: intubating stylet  Endotracheal tube insertion site: oral  Blade: Lilia  Blade size: #4  ETT size (mm): 7.5  Cormack-Lehane Classification: grade I - full view of glottis  Placement verified by: chest auscultation and capnometry   Measured from: lips  ETT to lips (cm): 20  Number of attempts at approach: 1

## 2025-03-28 NOTE — ANESTHESIA PROCEDURE NOTES
Arterial Line:    Date/Time: 3/28/2025 10:40 AM    Staffing  Performed: [facility]   Authorized by: Jonathan Rizzo MD    Performed by: Milagro Mancia RN    An arterial line was placed. Procedure performed using ultrasound guidance.in the OR for the following indication(s): continuous blood pressure monitoring and blood sampling needed.    A 20 gauge (size), 1 and 3/4 inch (length), Angiocath (type) catheter was placed into the Left radial artery, secured by tape,   Seldinger technique used.  Events:  patient tolerated procedure well with no complications.

## 2025-03-28 NOTE — ANESTHESIA PROCEDURE NOTES
Peripheral IV  Date/Time: 3/28/2025 10:40 AM  Inserted by: KAILEY Hardy-CRNA    Placement  Needle size: 16 G  Laterality: left  Location: hand  Local anesthetic: none  Site prep: chlorhexidine  Technique: anatomical landmarks  Attempts: 1

## 2025-03-29 VITALS
DIASTOLIC BLOOD PRESSURE: 79 MMHG | SYSTOLIC BLOOD PRESSURE: 123 MMHG | HEIGHT: 67 IN | BODY MASS INDEX: 24.6 KG/M2 | WEIGHT: 156.75 LBS | TEMPERATURE: 97.5 F | RESPIRATION RATE: 17 BRPM | OXYGEN SATURATION: 97 % | HEART RATE: 80 BPM

## 2025-03-29 LAB
ANION GAP SERPL CALC-SCNC: 10 MMOL/L (ref 10–20)
BUN SERPL-MCNC: 11 MG/DL (ref 6–23)
CALCIUM SERPL-MCNC: 8.4 MG/DL (ref 8.6–10.6)
CHLORIDE SERPL-SCNC: 102 MMOL/L (ref 98–107)
CO2 SERPL-SCNC: 26 MMOL/L (ref 21–32)
CREAT SERPL-MCNC: 0.69 MG/DL (ref 0.5–1.3)
EGFRCR SERPLBLD CKD-EPI 2021: >90 ML/MIN/1.73M*2
ERYTHROCYTE [DISTWIDTH] IN BLOOD BY AUTOMATED COUNT: 12.9 % (ref 11.5–14.5)
GLUCOSE SERPL-MCNC: 79 MG/DL (ref 74–99)
HCT VFR BLD AUTO: 38.8 % (ref 41–52)
HGB BLD-MCNC: 12.9 G/DL (ref 13.5–17.5)
MAGNESIUM SERPL-MCNC: 2.09 MG/DL (ref 1.6–2.4)
MCH RBC QN AUTO: 33.9 PG (ref 26–34)
MCHC RBC AUTO-ENTMCNC: 33.2 G/DL (ref 32–36)
MCV RBC AUTO: 102 FL (ref 80–100)
NRBC BLD-RTO: 0 /100 WBCS (ref 0–0)
PLATELET # BLD AUTO: 233 X10*3/UL (ref 150–450)
POTASSIUM SERPL-SCNC: 4 MMOL/L (ref 3.5–5.3)
RBC # BLD AUTO: 3.81 X10*6/UL (ref 4.5–5.9)
SODIUM SERPL-SCNC: 134 MMOL/L (ref 136–145)
WBC # BLD AUTO: 8.6 X10*3/UL (ref 4.4–11.3)

## 2025-03-29 PROCEDURE — 85027 COMPLETE CBC AUTOMATED: CPT | Performed by: STUDENT IN AN ORGANIZED HEALTH CARE EDUCATION/TRAINING PROGRAM

## 2025-03-29 PROCEDURE — 2500000001 HC RX 250 WO HCPCS SELF ADMINISTERED DRUGS (ALT 637 FOR MEDICARE OP): Performed by: STUDENT IN AN ORGANIZED HEALTH CARE EDUCATION/TRAINING PROGRAM

## 2025-03-29 PROCEDURE — 80048 BASIC METABOLIC PNL TOTAL CA: CPT | Performed by: STUDENT IN AN ORGANIZED HEALTH CARE EDUCATION/TRAINING PROGRAM

## 2025-03-29 PROCEDURE — 2500000004 HC RX 250 GENERAL PHARMACY W/ HCPCS (ALT 636 FOR OP/ED): Performed by: STUDENT IN AN ORGANIZED HEALTH CARE EDUCATION/TRAINING PROGRAM

## 2025-03-29 PROCEDURE — 36415 COLL VENOUS BLD VENIPUNCTURE: CPT | Performed by: STUDENT IN AN ORGANIZED HEALTH CARE EDUCATION/TRAINING PROGRAM

## 2025-03-29 PROCEDURE — 83735 ASSAY OF MAGNESIUM: CPT

## 2025-03-29 PROCEDURE — 97161 PT EVAL LOW COMPLEX 20 MIN: CPT | Mod: GP

## 2025-03-29 RX ORDER — OXYCODONE HYDROCHLORIDE 5 MG/1
5 TABLET ORAL EVERY 6 HOURS PRN
Qty: 12 TABLET | Refills: 0 | Status: SHIPPED | OUTPATIENT
Start: 2025-03-29 | End: 2025-04-01

## 2025-03-29 RX ADMIN — ASPIRIN 81 MG: 81 TABLET, COATED ORAL at 09:04

## 2025-03-29 RX ADMIN — HEPARIN SODIUM 5000 UNITS: 5000 INJECTION, SOLUTION INTRAVENOUS; SUBCUTANEOUS at 12:28

## 2025-03-29 RX ADMIN — ACETAMINOPHEN 650 MG: 325 TABLET ORAL at 00:36

## 2025-03-29 RX ADMIN — ACETAMINOPHEN 650 MG: 325 TABLET ORAL at 09:04

## 2025-03-29 RX ADMIN — CEFAZOLIN SODIUM 2 G: 2 INJECTION, SOLUTION INTRAVENOUS at 03:10

## 2025-03-29 RX ADMIN — HEPARIN SODIUM 5000 UNITS: 5000 INJECTION, SOLUTION INTRAVENOUS; SUBCUTANEOUS at 03:10

## 2025-03-29 ASSESSMENT — COGNITIVE AND FUNCTIONAL STATUS - GENERAL
MOVING TO AND FROM BED TO CHAIR: A LITTLE
MOBILITY SCORE: 24
WALKING IN HOSPITAL ROOM: A LITTLE
MOBILITY SCORE: 18
DAILY ACTIVITIY SCORE: 24
MOVING FROM LYING ON BACK TO SITTING ON SIDE OF FLAT BED WITH BEDRAILS: A LITTLE
TURNING FROM BACK TO SIDE WHILE IN FLAT BAD: A LITTLE
CLIMB 3 TO 5 STEPS WITH RAILING: A LITTLE
MOBILITY SCORE: 24
STANDING UP FROM CHAIR USING ARMS: A LITTLE
DAILY ACTIVITIY SCORE: 24

## 2025-03-29 ASSESSMENT — PAIN SCALES - GENERAL
PAINLEVEL_OUTOF10: 0 - NO PAIN
PAINLEVEL_OUTOF10: 2

## 2025-03-29 ASSESSMENT — ACTIVITIES OF DAILY LIVING (ADL): ADL_ASSISTANCE: INDEPENDENT

## 2025-03-29 ASSESSMENT — PAIN - FUNCTIONAL ASSESSMENT: PAIN_FUNCTIONAL_ASSESSMENT: 0-10

## 2025-03-29 NOTE — DISCHARGE SUMMARY
Discharge Diagnosis  PAD (peripheral artery disease) (CMS-[facility])    Issues Requiring Follow-Up  1 month follow up with Dr. Turner   Postoperative RONNA/TBI in 1 month (prior to follow up visit)    Test Results Pending At Discharge  Pending Labs       Order Current Status    Surgical Pathology Exam In process            Hospital Course  Iam Acevedo is a 64 year old male with a PMHx of CAD, PAD s/p L femoropopliteal atherectomy and iliac stent (Dr. Cyr), COPD, and HTN who presented as a surgical admit due to bilateral lower extremity CLTI 3 (L > R). He went to the OR with Dr. Turner on 3/28 for left femoral endarterectomy with patch angioplasty and completion angiogram. He recovered well postoperatively without acute concerns. On POD1, he was hemodynamically stable, saturating well on RA, tolerating a diet, voiding spontaneously, and able to walk around the unit without difficulty with pain well controlled. He worked with PT on POD1 who recommended no further PT. He was deemed medically stable for discharge home with appropriate postoperative follow up on POD1.     Pertinent Physical Exam At Time of Discharge  Constitutional: no acute distress  Neuro: alert and conversant  CV: non-cyanotic  Pulm: non-labored on 3L NC saturating %  Skin: warm and dry  Extremities: groin incision c/d/i  Pulse Exam: monophasic L PT/DP    Home Medications     Medication List      START taking these medications     oxyCODONE 5 mg immediate release tablet; Commonly known as: Roxicodone;   Take 1 tablet (5 mg) by mouth every 6 hours if needed for severe pain (7 -   10) for up to 3 days.     CONTINUE taking these medications     albuterol 90 mcg/actuation inhaler; Inhale 1 puff every 4 hours if   needed for wheezing or shortness of breath.   amLODIPine 10 mg tablet; Commonly known as: Norvasc; Take 1 tablet (10   mg) by mouth once daily.   aspirin 81 mg EC tablet   atorvastatin 40 mg tablet; Commonly known as: Lipitor; Take 1 tablet  (40   mg) by mouth once daily.   b complex 0.4 mg tablet   ferrous sulfate 325 (65 Fe) mg EC tablet   * fluticasone propion-salmeteroL 250-50 mcg/dose diskus inhaler;   Commonly known as: Advair Diskus; Inhale 1 puff 2 times a day.   * fluticasone propion-salmeteroL 250-50 mcg/dose diskus inhaler;   Commonly known as: Advair Diskus; Inhale 1 puff 2 times a day. Rinse mouth   with water after use to reduce aftertaste and incidence of candidiasis. Do   not swallow.   * fluticasone propion-salmeteroL 250-50 mcg/dose diskus inhaler;   Commonly known as: Advair Diskus; Inhale 1 puff 2 times a day.   lisinopriL-hydrochlorothiazide 20-12.5 mg tablet; Take 1 tablet by mouth   once daily.   multivitamin with minerals tablet   nicotine 14 mg/24 hr patch; Commonly known as: Nicoderm CQ; Place 1   patch over 24 hours on the skin once every 24 hours.  * This list has 3 medication(s) that are the same as other medications   prescribed for you. Read the directions carefully, and ask your doctor or   other care provider to review them with you.     STOP taking these medications     chlorhexidine 4 % external liquid; Commonly known as: Hibiclens       Outpatient Follow-Up  Future Appointments   Date Time Provider Department Center   4/7/2025 10:15 AM GEN MOBILE ADMIN ROOM PET CT [facility]   4/7/2025 11:15 AM GEN MOBILE PET CT [facility]   4/8/2025  4:00 PM Genesis Rivera MD [id number]       Patient discussed with Dr. Elia Bowden MD  PGY-1   Vascular Surgery  Service Pager: [phone]

## 2025-03-29 NOTE — CARE PLAN
The patient's goals for the shift include      The clinical goals for the shift include Patient will remain HDS throughout shift      Problem: Pain - Adult  Goal: Verbalizes/displays adequate comfort level or baseline comfort level  Outcome: Progressing     Problem: Safety - Adult  Goal: Free from fall injury  Outcome: Progressing     Problem: Discharge Planning  Goal: Discharge to home or other facility with appropriate resources  Outcome: Progressing     Problem: Chronic Conditions and Co-morbidities  Goal: Patient's chronic conditions and co-morbidity symptoms are monitored and maintained or improved  Outcome: Progressing     Problem: Nutrition  Goal: Nutrient intake appropriate for maintaining nutritional needs  Outcome: Progressing     Problem: Indwelling Catheter Maintenance  Goal: I will have no complications from indwelling catheter  Outcome: Progressing

## 2025-03-29 NOTE — CARE PLAN
The patient's goals for the shift include      The clinical goals for the shift include Patient remain free of falls/injury throughout shift.      Problem: Safety - Adult  Goal: Free from fall injury  Outcome: Progressing

## 2025-03-29 NOTE — PROGRESS NOTES
"Physical Therapy    Physical Therapy Evaluation    Patient Name: Iam Acevedo \"Brandon\"  MRN: [id number]  Department: [place] [address]  Room: [address] [place] [address]  Today's Date: 3/29/2025   Time Calculation  Start Time: 0919  Stop Time: 0939  Time Calculation (min): 20 min    Assessment/Plan   PT Assessment  Barriers to Discharge Home: No anticipated barriers  Medical Staff Made Aware: Yes  End of Session Communication: Bedside nurse  Assessment Comment: Patient is a 63yo M s/p left femoral endarterectomy with patch angioplasty and completion angiogram. Patient is indep at baseline and lives with wife who can assist if needed. Patient was supervision for mobility. no further acute PT needs.  End of Session Patient Position: Up in chair, Alarm off, not on at start of session  IP OR SWING BED PT PLAN  Inpatient or Swing Bed: Inpatient  PT Plan  PT Plan: PT Eval only  PT Eval Only Reason: At baseline function  PT Frequency: PT eval only  PT Discharge Recommendations: No further acute PT  PT - OK to Discharge: Yes    Subjective   General Visit Information:  General  Reason for Referral: s/p left femoral endarterectomy with patch angioplasty and completion angiogram  Past Medical History Relevant to Rehab: CAD, COPD, PAD,  Prior to Session Communication: Bedside nurse  Patient Position Received: Bed, 3 rail up, Alarm off, not on at start of session  General Comment: Pt supine in bed, RN cleared. very pleasant and cooperative  Home Living:  Home Living  Type of Home: House  Lives With: Spouse  Home Adaptive Equipment: Walker rolling or standard, Cane (alot of DME from late mother in law)  Home Layout: One level  Home Access: Stairs to enter with rails  Entrance Stairs-Number of Steps: 3  Bathroom Shower/Tub: Tub/shower unit  Bathroom Equipment: Grab bars in shower, Shower chair with back  Prior Level of Function:  Prior Function Per Pt/Caregiver Report  Level of [place]: Independent with ADLs and functional transfers, Independent " with homemaking with ambulation  ADL Assistance: Independent  Homemaking Assistance: Independent  Ambulatory Assistance: Independent  Vocational:  (works as a corporate )  Prior Function Comments: + drive, denies falls  Precautions:  Precautions  Medical Precautions: Fall precautions      Date/Time Vitals Session Patient Position Pulse Resp SpO2 BP MAP (mmHg)    03/29/25 0919 --  --  72  --  95 %  --  --                 Objective   Pain:  Pain Assessment  Pain Assessment: 0-10  0-10 (Numeric) Pain Score: 2  Pain Type: Surgical pain  Pain Location: Groin  Cognition:  Cognition  Overall Cognitive Status: Within Functional Limits  Orientation Level: Oriented X4    General Assessments:     Activity Tolerance  Endurance: Endurance does not limit participation in activity    Sensation  Light Touch: No apparent deficits    Functional Assessments:  Bed Mobility  Bed Mobility: Yes  Bed Mobility 1  Bed Mobility 1: Supine to sitting  Level of Assistance 1: Distant supervision    Transfers  Transfer: Yes  Transfer 1  Transfer From 1: Sit to, Stand to  Transfer to 1: Stand, Sit  Technique 1: Sit to stand, Stand to sit  Transfer Level of Assistance 1: Close supervision  Trials/Comments 1: stood from EOB    Ambulation/Gait Training  Ambulation/Gait Training Performed: Yes  Ambulation/Gait Training 1  Surface 1: Level tile  Device 1: No device  Assistance 1: Distant supervision  Comments/Distance (ft) 1: 150' x2, steady  Extremity/Trunk Assessments:  RLE   RLE : Within Functional Limits  LLE   LLE : Within Functional Limits  Outcome Measures:  [facility] Basic Mobility  Turning from your back to your side while in a flat bed without using bedrails: A little  Moving from lying on your back to sitting on the side of a flat bed without using bedrails: A little  Moving to and from bed to chair (including a wheelchair): A little  Standing up from a chair using your arms (e.g. wheelchair or bedside chair): A little  To walk in  hospital room: A little  Climbing 3-5 steps with railing: A little  Basic Mobility - Total Score: 18    Encounter Problems       Encounter Problems (Active)       Pain - Adult              Education Documentation  Precautions, taught by Nichole Alvarez PT at 3/29/2025 10:45 AM.  Learner: Patient  Readiness: Acceptance  Method: Explanation  Response: Verbalizes Understanding  Comment: edu on role of PT and dc plan    Mobility Training, taught by Nichole Alvarez PT at 3/29/2025 10:45 AM.  Learner: Patient  Readiness: Acceptance  Method: Explanation  Response: Verbalizes Understanding  Comment: edu on role of PT and dc plan    Education Comments  No comments found.

## 2025-03-29 NOTE — PROGRESS NOTES
VASCULAR SURGERY PROGRESS NOTE  Assessment/Plan   Iam Acevedo is 64 y.o. male with history of BL LE CLTI 3 (L > R) who is now s/p left femoral endarterectomy with patch angioplasty, completion angiogram  on 3/28.    Plan:  Q4h NV checks  Pain control as needed  Wean O2 as tolerated  ASA 81   Regular diet  [place] removed, TOV  Complete 24h perioperative antibiotics  PT/OT  SQH DVT ppx  Dispo: out of SDU, to RNF  Possible dc later today if able to wean off O2 and evaluated by PT    Patient discussed with Dr. Elia Bowden MD  PGY-1   Vascular Surgery  Service Pager: [phone]    Subjective   NAEON. Patient denies chest pain, shortness of breath. Denies abdominal pain. On 3L NC, not on home O2 - will wean as tolerated (saturating %).     Objective   Vitals:  Heart Rate:  [56-90]   Temp:  [36 °C (96.8 °F)-36.9 °C (98.4 °F)]   Resp:  [10-25]   BP: (100-136)/(66-77)   Weight:  [71.1 kg (156 lb 12 oz)]   SpO2:  [94 %-100 %]     Exam:  Constitutional: no acute distress  Neuro: alert and conversant  CV: non-cyanotic  Pulm: non-labored on 3L NC saturating %  Skin: warm and dry  Extremities: groin incision c/d/i  Pulse Exam: monophasic L PT/DP    Labs:  Results from last 7 days   Lab Units 03/29/25  0759   WBC AUTO x10*3/uL 8.6   HEMOGLOBIN g/dL 12.9*   PLATELETS AUTO x10*3/uL 233      Results from last 7 days   Lab Units 03/29/25  0759   SODIUM mmol/L 134*   POTASSIUM mmol/L 4.0   CHLORIDE mmol/L 102   CO2 mmol/L 26   BUN mg/dL 11   CREATININE mg/dL 0.69   GLUCOSE mg/dL 79   MAGNESIUM mg/dL 2.09

## 2025-03-30 NOTE — OP NOTE
"ENDARTERECTOMY, LOWER EXTREMITY (L), ANGIOGRAM, LOWER EXTREMITY (L) Operative Note     Date: 3/28/2025  OR Location: [facility] OR    Name: Iam Acevedo \"Brandon\", : 1960, Age: 64 y.o., MRN: [id number], Sex: male    Diagnosis  Pre-op Diagnosis      * PAD (peripheral artery disease) (CMS-HCC) [I73.9] Post-op Diagnosis     * PAD (peripheral artery disease) (CMS-HCC) [I73.9]     Procedures  Left iliofemoral endarterectomy with bovine patch angioplasty  Selective angiography of the left lower extremity      Surgeons      * Mabel Turner - Primary    Resident/Fellow/Other Assistant:  Surgeons and Role:     * Myrna Deshpande MD - Fellow    Staff:   Circulator: Preston Alfordub Person: Sarah Trujillo Person: Leia  Circulator: Miriam Sanderson Circulator: Mago  Relief Scrub: Mago    Anesthesia Staff: Anesthesiologist: Jonathan Rizzo MD  CRNA: KAILEY Hardy-CRNA; KAILEY Skinner-CRNA  SRNA: Milagro Mancia RN    Procedure Summary  Anesthesia: General  ASA: III  Estimated Blood Loss: 50 mL  Intra-op Medications:   Administrations occurring from 1005 to 1605 on 25:   Medication Name Total Dose   ceFAZolin (Ancef) 1 gram/ 3 mL injection - reconstituted 330 mg/mL 2 g   dexAMETHasone (Decadron) injection 4 mg/mL 4 mg   ePHEDrine injection 5 mg   fentaNYL (Sublimaze) injection 50 mcg/mL 100 mcg   heparin injection 1,000 units/mL 9,000 Units   HYDROmorphone (Dilaudid) injection 0.5 mg 1 mg   HYDROmorphone PF (Dilaudid) injection 0.2 mg 0.4 mg   LR bolus Cannot be calculated   LR infusion Cannot be calculated   lactated Ringer's infusion Cannot be calculated   lidocaine (cardiac) injection 2% prefilled syringe 100 mg   metoprolol 5 mg/5 mL 5 mg   midazolam PF (Versed) injection 1 mg/mL 2 mg   ondansetron (Zofran) 2 mg/mL injection 4 mg   phenylephrine (Derrek-Synephrine) injection 600 mcg   propofol (Diprivan) injection 10 mg/mL 120 mg   protamine injection 40 mg   rocuronium (ZeMuron) 50 mg/5 mL injection " "140 mg   sugammadex (Bridion) 200 mg/2 mL injection 200 mg              Anesthesia Record               Intraprocedure I/O Totals          Intake    LR bolus 1000.00 mL    LR infusion 100.00 mL    Autologous Blood 0 mL    Cell Saver 0 mL    Total Intake 1100 mL       Output    Urine 200 mL    Est. Blood Loss 25 mL    NG/OG Tube Output 0 mL    Other 0 mL    Total Output 225 mL       Net    Net Volume 875 mL          Specimen:   ID Type Source Tests Collected by Time   1 : LEFT FEMORAL PLAQUE Tissue PLAQUE SURGICAL PATHOLOGY EXAM Mabel Turner MD 3/28/2025 1129                 Drains and/or Catheters:   [REMOVED] Urethral Catheter (Removed)   Site Assessment Clean;Skin intact 03/28/25 2213   Collection Container Standard drainage bag 03/28/25 1530   Securement Method Securing device (Describe) 03/28/25 1530   Reason for Continuing Urinary Catheterization surgical procedures: urological/gynecological, pelvic oncology, anal, prolonged surgical procedure 03/28/25 1530   Output (mL) 200 mL 03/29/25 0042       Tourniquet Times:         Implants:  Implants       Type Name Action Serial No.      Implant GRAFT, XENOSURE, BIOLOGIC PATCH, 0.8CM X 8CM - [id number] Implanted               Findings: Bulky left femoral plaque    Indications: Ima Acevedo \"Ignacio" is an 64 y.o. male who is having surgery for PAD (peripheral artery disease) (CMS-[facility]) [I73.9]. Patient presents with LLE CLTI 3 lifestyle limiting claudication. He had previous endovascular intervention by Dr. Cyr and now has recurrence of symptoms. His noninvasive studies are significant for left RONNA 0.56, TBI 0.38. He presents for surgical revascularization    The patient was seen in the preoperative area. The risks, benefits, complications, treatment options, non-operative alternatives, expected recovery and outcomes were discussed with the patient. The possibilities of reaction to medication, pulmonary aspiration, injury to surrounding structures, bleeding, " recurrent infection, the need for additional procedures, failure to diagnose a condition, and creating a complication requiring transfusion or operation were discussed with the patient. The patient concurred with the proposed plan, giving informed consent.  The site of surgery was properly noted/marked if necessary per policy. The patient has been actively warmed in preoperative area. Preoperative antibiotics have been ordered and given within 1 hours of incision. Venous thrombosis prophylaxis have been ordered including chemical prophylaxis    Procedure Details:   #15 blade used to create a left femoral scimitar incision. Electrocautery used to dissect through subcutaneous tissue until inguinal ligament visualized. The artery was palpated and femoral sheath incised. Dissection continued until the distal external iliac, common femoral artery, superficial femoral artery, and profunda artery were exposed and controlled with vessel loops. Patient was systemically heparinized and serial ACT > 250 checked.    Arteries were clamped and #11 blade used to create an arteriotomy which was extended with Campbell scissors. Large bulky irregular plaque removed with freer elevator and carefully tapered to endpoints proximally and into the profunda. The proximal portion did have a small soft area which was clampable but the plaque was removed up to the distal EIA at the takeoff of the inferior epigastric artery. There were two large profunda branches and a coral reef component of the plaque which was obstructing the orifices. Heparinized saline was used to ensure no lifting flaps. A bovine patch was used for patch angioplasty using 6-0 prolene. Flushing maneuvers performed prior to completion of anastomosis. Doppler used to evaluate distal signals and noted to be adequate.     Microsheath inserted into the left femoral artery and hand injection used to perform selective angiography of the left lower extremity. This was significant  for patent CFA, profundas, SFA. 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 There is reconstitution with PT and AT to the foot.    Complications:  None; patient tolerated the procedure well.    Disposition: PACU - hemodynamically stable.  Condition: stable       Attending Attestation: I was present and scrubbed for the entire procedure.    Mabel Turner  Phone Number: [phone]

## 2025-03-31 NOTE — ANESTHESIA POSTPROCEDURE EVALUATION
"Patient: Iam Acevedo \"Brandon\"    Procedure Summary       Date: 03/28/25 Room / Location: [facility] OR 17 / [facility] OR    Anesthesia Start: 1004 Anesthesia Stop: 1326    Procedures:       ENDARTERECTOMY, LOWER EXTREMITY (Left)      ANGIOGRAM, LOWER EXTREMITY (Left) Diagnosis:       PAD (peripheral artery disease) (CMS-HCC)      (PAD (peripheral artery disease) (CMS-HCC) [I73.9])    Surgeons: Mabel Turner MD Responsible Provider: Jonathan Rizzo MD    Anesthesia Type: general ASA Status: 3            Anesthesia Type: general    Vitals Value Taken Time   /73 03/28/25 1530   Temp 36.2 °C (97.2 °F) 03/28/25 1530   Pulse 70 03/28/25 1539   Resp 9 03/28/25 1539   SpO2 98 % 03/28/25 1539   Vitals shown include unfiled device data.    Anesthesia Post Evaluation    Patient location during evaluation: PACU  Patient participation: complete - patient participated  Level of consciousness: awake  Pain management: adequate  Airway patency: patent  Cardiovascular status: acceptable  Respiratory status: acceptable  Hydration status: acceptable  Postoperative Nausea and Vomiting: none        There were no known notable events for this encounter.    "

## 2025-04-07 ENCOUNTER — HOSPITAL ENCOUNTER (OUTPATIENT)
Dept: RADIOLOGY | Facility: HOSPITAL | Age: 65
Discharge: HOME | End: 2025-04-07
Payer: COMMERCIAL

## 2025-04-07 DIAGNOSIS — R91.8 LUNG MASS: ICD-10-CM

## 2025-04-07 LAB
LABORATORY COMMENT REPORT: NORMAL
PATH REPORT.FINAL DX SPEC: NORMAL
PATH REPORT.GROSS SPEC: NORMAL
PATH REPORT.RELEVANT HX SPEC: NORMAL
PATH REPORT.TOTAL CANCER: NORMAL

## 2025-04-07 PROCEDURE — 78815 PET IMAGE W/CT SKULL-THIGH: CPT | Mod: PET TUMOR INIT TX STRAT | Performed by: RADIOLOGY

## 2025-04-07 PROCEDURE — A9552 F18 FDG: HCPCS | Performed by: INTERNAL MEDICINE

## 2025-04-07 PROCEDURE — 3430000001 HC RX 343 DIAGNOSTIC RADIOPHARMACEUTICALS: Performed by: INTERNAL MEDICINE

## 2025-04-07 PROCEDURE — 78815 PET IMAGE W/CT SKULL-THIGH: CPT | Mod: PI

## 2025-04-07 RX ORDER — FLUDEOXYGLUCOSE F 18 200 MCI/ML
10.6 INJECTION, SOLUTION INTRAVENOUS
Status: COMPLETED | OUTPATIENT
Start: 2025-04-07 | End: 2025-04-07

## 2025-04-07 RX ADMIN — FLUDEOXYGLUCOSE F 18 10.6 MILLICURIE: 200 INJECTION, SOLUTION INTRAVENOUS at 10:11

## 2025-04-08 ENCOUNTER — OFFICE VISIT (OUTPATIENT)
Dept: HEMATOLOGY/ONCOLOGY | Facility: CLINIC | Age: 65
End: 2025-04-08
Payer: COMMERCIAL

## 2025-04-08 VITALS
TEMPERATURE: 97.7 F | HEART RATE: 104 BPM | RESPIRATION RATE: 17 BRPM | DIASTOLIC BLOOD PRESSURE: 80 MMHG | SYSTOLIC BLOOD PRESSURE: 119 MMHG | WEIGHT: 148.15 LBS | BODY MASS INDEX: 23.81 KG/M2 | HEIGHT: 66 IN | OXYGEN SATURATION: 97 %

## 2025-04-08 DIAGNOSIS — R91.8 LUNG MASS: ICD-10-CM

## 2025-04-08 PROCEDURE — 3079F DIAST BP 80-89 MM HG: CPT | Performed by: INTERNAL MEDICINE

## 2025-04-08 PROCEDURE — 3008F BODY MASS INDEX DOCD: CPT | Performed by: INTERNAL MEDICINE

## 2025-04-08 PROCEDURE — 99215 OFFICE O/P EST HI 40 MIN: CPT | Performed by: INTERNAL MEDICINE

## 2025-04-08 PROCEDURE — 1123F ACP DISCUSS/DSCN MKR DOCD: CPT | Performed by: INTERNAL MEDICINE

## 2025-04-08 PROCEDURE — 1111F DSCHRG MED/CURRENT MED MERGE: CPT | Performed by: INTERNAL MEDICINE

## 2025-04-08 PROCEDURE — 1126F AMNT PAIN NOTED NONE PRSNT: CPT | Performed by: INTERNAL MEDICINE

## 2025-04-08 PROCEDURE — 99205 OFFICE O/P NEW HI 60 MIN: CPT | Performed by: INTERNAL MEDICINE

## 2025-04-08 PROCEDURE — 1159F MED LIST DOCD IN RCRD: CPT | Performed by: INTERNAL MEDICINE

## 2025-04-08 PROCEDURE — 3074F SYST BP LT 130 MM HG: CPT | Performed by: INTERNAL MEDICINE

## 2025-04-08 ASSESSMENT — ENCOUNTER SYMPTOMS
DEPRESSION: 0
GASTROINTESTINAL NEGATIVE: 1
LOSS OF SENSATION IN FEET: 0
OCCASIONAL FEELINGS OF UNSTEADINESS: 0
CONSTITUTIONAL NEGATIVE: 1
SHORTNESS OF BREATH: 1

## 2025-04-08 ASSESSMENT — PAIN SCALES - GENERAL: PAINLEVEL_OUTOF10: 0-NO PAIN

## 2025-04-08 ASSESSMENT — COLUMBIA-SUICIDE SEVERITY RATING SCALE - C-SSRS
2. HAVE YOU ACTUALLY HAD ANY THOUGHTS OF KILLING YOURSELF?: NO
1. IN THE PAST MONTH, HAVE YOU WISHED YOU WERE DEAD OR WISHED YOU COULD GO TO SLEEP AND NOT WAKE UP?: NO
6. HAVE YOU EVER DONE ANYTHING, STARTED TO DO ANYTHING, OR PREPARED TO DO ANYTHING TO END YOUR LIFE?: NO

## 2025-04-08 NOTE — H&P (VIEW-ONLY)
Patient ID: Brandon Acevedo is a 65 y.o. male.  Referring Physician: Schuyler Giang MD  [address] [place] [id number]  Primary Care Provider: JAN Neville  Visit Type:  Initial Visit     Subjective    HPI  Iam Acevedo is a 64 year old male with a PMHx of CAD, PAD s/p L femoropopliteal atherectomy and iliac stent (Dr. Cyr), COPD, and HTN who presented as a surgical admit due to bilateral lower extremity CLTI 3 (L > R). He went to the OR with Dr. Turner on 3/28 for left femoral endarterectomy with patch angioplasty and completion angiogram.    3/24/2025 patient had a CT angio which revealed a spiculated left lower lobe lung mass measuring 3.1 x 2.6 cm highly concerning for primary lung malignancy patient was referred immediately to medical oncology.    Subsequent PET scan showing localized disease amenable to surgical intervention patient is being referred to Dr. Young for consultation.    Review of Systems   Constitutional: Negative.    HENT:  Negative.     Respiratory:  Positive for shortness of breath.    Gastrointestinal: Negative.         IMPRESSION:  1. Dilated aortic root measuring up to 4.3 cm at the sinus of  Valsalva (cusp to commissure; 4.5 cm max sinus to sinus). Mid  ascending aorta is at the upper limits of normal measuring 3.9 cm and  remaining thoracic aorta is normal in course and caliber. Moderate  thoracic aortic atherosclerosis. Recommend continued imaging  surveillance as per clinical guidelines.  2. Spiculated left lower lobe lung mass measuring 3.1 x 2.6 cm which  is highly concerning for primary lung malignancy. Recommend further  evaluation with PET-CT, tissue sampling, and pulmonary medicine  referral.  3. Severe diffuse upper lobe emphysema and paraseptal emphysema.  4. Severe coronary artery calcifications.      Signed by: Jasvir Gutierrez 3/24/2025    Objective   BSA: 1.77 meters squared  /80 (BP Location: Left arm, Patient Position: Sitting, BP Cuff Size:  "Adult)   Pulse 104   Temp 36.5 °C (97.7 °F) (Temporal)   Resp 17   Ht (S) 1.683 m (5' 6.26\")   Wt 67.2 kg (148 lb 2.4 oz)   SpO2 97%   BMI 23.72 kg/m²      has a past medical history of Atherosclerosis of native arteries of extremities with intermittent claudication, left leg (03/21/2022), Encounter for immunization (09/22/2016), Other acute postprocedural pain (02/08/2021), Personal history of nicotine dependence (03/22/2022), Personal history of nicotine dependence (11/08/2018), Personal history of other diseases of the digestive system (03/11/2021), Pneumonia, unspecified organism, Solitary pulmonary nodule (04/30/2015), and Unspecified abdominal hernia without obstruction or gangrene.   has a past surgical history that includes Hernia repair (03/11/2021); Other surgical history (10/05/2020); and Multiple tooth extractions (Bilateral, 10/21/2024).  No family history on file.  Oncology History    No history exists.       Iam Acevedo \"Brandon\"  reports that he has quit smoking. His smoking use included cigarettes. He has never used smokeless tobacco.  He  reports current alcohol use of about 24.0 standard drinks of alcohol per week.  He  reports no history of drug use.    Physical Exam  Constitutional:       Appearance: Normal appearance.   HENT:      Head: Normocephalic and atraumatic.   Eyes:      Extraocular Movements: Extraocular movements intact.      Pupils: Pupils are equal, round, and reactive to light.   Neurological:      Mental Status: He is alert.         WBC   Date/Time Value Ref Range Status   03/29/2025 07:59 AM 8.6 4.4 - 11.3 x10*3/uL Final   03/17/2025 11:02 AM 7.5 4.4 - 11.3 x10*3/uL Final   11/11/2024 05:06 PM 6.8 4.4 - 11.3 x10*3/uL Final     nRBC   Date Value Ref Range Status   03/29/2025 0.0 0.0 - 0.0 /100 WBCs Final   03/17/2025 0.0 0.0 - 0.0 /100 WBCs Final   11/11/2024 0.0 0.0 - 0.0 /100 WBCs Final     RBC   Date Value Ref Range Status   03/29/2025 3.81 (L) 4.50 - 5.90 x10*6/uL " Final   03/17/2025 4.54 4.50 - 5.90 x10*6/uL Final   11/11/2024 4.02 (L) 4.50 - 5.90 x10*6/uL Final     Hemoglobin   Date Value Ref Range Status   03/29/2025 12.9 (L) 13.5 - 17.5 g/dL Final   03/17/2025 14.9 13.5 - 17.5 g/dL Final   11/11/2024 13.2 (L) 13.5 - 17.5 g/dL Final     Hematocrit   Date Value Ref Range Status   03/29/2025 38.8 (L) 41.0 - 52.0 % Final   03/17/2025 43.8 41.0 - 52.0 % Final   11/11/2024 39.8 (L) 41.0 - 52.0 % Final     MCV   Date/Time Value Ref Range Status   03/29/2025 07:59  (H) 80 - 100 fL Final   03/17/2025 11:02 AM 97 80 - 100 fL Final   11/11/2024 05:06 PM 99 80 - 100 fL Final     MCH   Date/Time Value Ref Range Status   03/29/2025 07:59 AM 33.9 26.0 - 34.0 pg Final   03/17/2025 11:02 AM 32.8 26.0 - 34.0 pg Final   11/11/2024 05:06 PM 32.8 26.0 - 34.0 pg Final     MCHC   Date/Time Value Ref Range Status   03/29/2025 07:59 AM 33.2 32.0 - 36.0 g/dL Final   03/17/2025 11:02 AM 34.0 32.0 - 36.0 g/dL Final   11/11/2024 05:06 PM 33.2 32.0 - 36.0 g/dL Final     RDW   Date/Time Value Ref Range Status   03/29/2025 07:59 AM 12.9 11.5 - 14.5 % Final   03/17/2025 11:02 AM 12.8 11.5 - 14.5 % Final   11/11/2024 05:06 PM 14.4 11.5 - 14.5 % Final     Platelets   Date/Time Value Ref Range Status   03/29/2025 07:59  150 - 450 x10*3/uL Final   03/17/2025 11:02  150 - 450 x10*3/uL Final   11/11/2024 05:06  150 - 450 x10*3/uL Final     MPV   Date/Time Value Ref Range Status   10/27/2023 09:38 AM 9.4 7.5 - 11.5 fL Final     Neutrophils %   Date/Time Value Ref Range Status   11/11/2024 05:06 PM 58.3 40.0 - 80.0 % Final   10/27/2023 09:38 AM 60.9 40.0 - 80.0 % Final     Immature Granulocytes %, Automated   Date/Time Value Ref Range Status   11/11/2024 05:06 PM 0.3 0.0 - 0.9 % Final     Comment:     Immature Granulocyte Count (IG) includes promyelocytes, myelocytes and metamyelocytes but does not include bands. Percent differential counts (%) should be interpreted in the context of the  absolute cell counts (cells/UL).   10/27/2023 09:38 AM 0.2 0.0 - 0.9 % Final     Comment:     Immature Granulocyte Count (IG) includes promyelocytes, myelocytes and metamyelocytes but does not include bands. Percent differential counts (%) should be interpreted in the context of the absolute cell counts (cells/UL).     Lymphocytes %   Date/Time Value Ref Range Status   11/11/2024 05:06 PM 28.7 13.0 - 44.0 % Final   10/27/2023 09:38 AM 27.2 13.0 - 44.0 % Final     Monocytes %   Date/Time Value Ref Range Status   11/11/2024 05:06 PM 9.6 2.0 - 10.0 % Final   10/27/2023 09:38 AM 9.3 2.0 - 10.0 % Final     Eosinophils %   Date/Time Value Ref Range Status   11/11/2024 05:06 PM 2.5 0.0 - 6.0 % Final   10/27/2023 09:38 AM 1.9 0.0 - 6.0 % Final     Basophils %   Date/Time Value Ref Range Status   11/11/2024 05:06 PM 0.6 0.0 - 2.0 % Final   10/27/2023 09:38 AM 0.5 0.0 - 2.0 % Final     Neutrophils Absolute   Date/Time Value Ref Range Status   11/11/2024 05:06 PM 3.96 1.20 - 7.70 x10*3/uL Final     Comment:     Percent differential counts (%) should be interpreted in the context of the absolute cell counts (cells/uL).   10/27/2023 09:38 AM 3.89 1.20 - 7.70 x10*3/uL Final     Comment:     Percent differential counts (%) should be interpreted in the context of the absolute cell counts (cells/uL).     Immature Granulocytes Absolute, Automated   Date/Time Value Ref Range Status   11/11/2024 05:06 PM 0.02 0.00 - 0.70 x10*3/uL Final   10/27/2023 09:38 AM 0.01 0.00 - 0.70 x10*3/uL Final     Lymphocytes Absolute   Date/Time Value Ref Range Status   11/11/2024 05:06 PM 1.95 1.20 - 4.80 x10*3/uL Final   10/27/2023 09:38 AM 1.73 1.20 - 4.80 x10*3/uL Final     Monocytes Absolute   Date/Time Value Ref Range Status   11/11/2024 05:06 PM 0.65 0.10 - 1.00 x10*3/uL Final   10/27/2023 09:38 AM 0.59 0.10 - 1.00 x10*3/uL Final     Eosinophils Absolute   Date/Time Value Ref Range Status   11/11/2024 05:06 PM 0.17 0.00 - 0.70 x10*3/uL Final  "  10/27/2023 09:38 AM 0.12 0.00 - 0.70 x10*3/uL Final     Basophils Absolute   Date/Time Value Ref Range Status   11/11/2024 05:06 PM 0.04 0.00 - 0.10 x10*3/uL Final   10/27/2023 09:38 AM 0.03 0.00 - 0.10 x10*3/uL Final       No components found for: \"PT\"  aPTT   Date/Time Value Ref Range Status   11/15/2017 02:19 PM 32 25 - 36 sec Final     Comment:       THE APTT IS NO LONGER USED FOR MONITORING     UNFRACTIONATED HEPARIN THERAPY.    FOR MONITORING HEPARIN THERAPY,     USE THE HEPARIN ASSAY.         Assessment/Plan      65-year-old Male referred because of PET avid left upper lobe lesion measuring at least 2.3 x 3.4 cm spiculated patient giving strong history of smoking 3 packs/day currently quitting and referred because of a clinical suspicion of non-small cell/small cell lung cancer.  No biopsy currently available.  PET scan shows localized disease and given the size I am referring patient for CT surgical evaluation and possibly and possible intervention..  Significantly the fact that patient has significant atherosclerotic disease.    Refer to Dr. Young.  RTC as needed and in 8 weeks.  CT surgery to manage biopsy and histopathology I will call complete confirmation as well as recommendations in regard to surgical approaches to this localized disease.  I am concerned about extensive atherosclerosis but I remain to be advised by CT surgery.     Diagnoses and all orders for this visit:  Lung mass  -     Referral To Hematology and Oncology  -     Carcinoembryonic Antigen; Future  -     Clinic Appointment Request Follow Up; [facility]; Future  -     Referral to Thoracic Surgery; Future           Genesis Welch MD                         "

## 2025-04-08 NOTE — PROGRESS NOTES
Patient ID: Brandon Acevedo is a 65 y.o. male.  Referring Physician: Schuyler Giang MD  [address] [place] [id number]  Primary Care Provider: JAN Neville  Visit Type:  Initial Visit     Subjective    HPI  Iam Acevedo is a 64 year old male with a PMHx of CAD, PAD s/p L femoropopliteal atherectomy and iliac stent (Dr. Cyr), COPD, and HTN who presented as a surgical admit due to bilateral lower extremity CLTI 3 (L > R). He went to the OR with Dr. Turner on 3/28 for left femoral endarterectomy with patch angioplasty and completion angiogram.    3/24/2025 patient had a CT angio which revealed a spiculated left lower lobe lung mass measuring 3.1 x 2.6 cm highly concerning for primary lung malignancy patient was referred immediately to medical oncology.    Subsequent PET scan showing localized disease amenable to surgical intervention patient is being referred to Dr. Young for consultation.    Review of Systems   Constitutional: Negative.    HENT:  Negative.     Respiratory:  Positive for shortness of breath.    Gastrointestinal: Negative.         IMPRESSION:  1. Dilated aortic root measuring up to 4.3 cm at the sinus of  Valsalva (cusp to commissure; 4.5 cm max sinus to sinus). Mid  ascending aorta is at the upper limits of normal measuring 3.9 cm and  remaining thoracic aorta is normal in course and caliber. Moderate  thoracic aortic atherosclerosis. Recommend continued imaging  surveillance as per clinical guidelines.  2. Spiculated left lower lobe lung mass measuring 3.1 x 2.6 cm which  is highly concerning for primary lung malignancy. Recommend further  evaluation with PET-CT, tissue sampling, and pulmonary medicine  referral.  3. Severe diffuse upper lobe emphysema and paraseptal emphysema.  4. Severe coronary artery calcifications.      Signed by: Jasvir Gutierrez 3/24/2025    Objective   BSA: 1.77 meters squared  /80 (BP Location: Left arm, Patient Position: Sitting, BP Cuff Size:  "Adult)   Pulse 104   Temp 36.5 °C (97.7 °F) (Temporal)   Resp 17   Ht (S) 1.683 m (5' 6.26\")   Wt 67.2 kg (148 lb 2.4 oz)   SpO2 97%   BMI 23.72 kg/m²      has a past medical history of Atherosclerosis of native arteries of extremities with intermittent claudication, left leg (03/21/2022), Encounter for immunization (09/22/2016), Other acute postprocedural pain (02/08/2021), Personal history of nicotine dependence (03/22/2022), Personal history of nicotine dependence (11/08/2018), Personal history of other diseases of the digestive system (03/11/2021), Pneumonia, unspecified organism, Solitary pulmonary nodule (04/30/2015), and Unspecified abdominal hernia without obstruction or gangrene.   has a past surgical history that includes Hernia repair (03/11/2021); Other surgical history (10/05/2020); and Multiple tooth extractions (Bilateral, 10/21/2024).  No family history on file.  Oncology History    No history exists.       Iam Acevedo \"Brandon\"  reports that he has quit smoking. His smoking use included cigarettes. He has never used smokeless tobacco.  He  reports current alcohol use of about 24.0 standard drinks of alcohol per week.  He  reports no history of drug use.    Physical Exam  Constitutional:       Appearance: Normal appearance.   HENT:      Head: Normocephalic and atraumatic.   Eyes:      Extraocular Movements: Extraocular movements intact.      Pupils: Pupils are equal, round, and reactive to light.   Neurological:      Mental Status: He is alert.         WBC   Date/Time Value Ref Range Status   03/29/2025 07:59 AM 8.6 4.4 - 11.3 x10*3/uL Final   03/17/2025 11:02 AM 7.5 4.4 - 11.3 x10*3/uL Final   11/11/2024 05:06 PM 6.8 4.4 - 11.3 x10*3/uL Final     nRBC   Date Value Ref Range Status   03/29/2025 0.0 0.0 - 0.0 /100 WBCs Final   03/17/2025 0.0 0.0 - 0.0 /100 WBCs Final   11/11/2024 0.0 0.0 - 0.0 /100 WBCs Final     RBC   Date Value Ref Range Status   03/29/2025 3.81 (L) 4.50 - 5.90 x10*6/uL " Final   03/17/2025 4.54 4.50 - 5.90 x10*6/uL Final   11/11/2024 4.02 (L) 4.50 - 5.90 x10*6/uL Final     Hemoglobin   Date Value Ref Range Status   03/29/2025 12.9 (L) 13.5 - 17.5 g/dL Final   03/17/2025 14.9 13.5 - 17.5 g/dL Final   11/11/2024 13.2 (L) 13.5 - 17.5 g/dL Final     Hematocrit   Date Value Ref Range Status   03/29/2025 38.8 (L) 41.0 - 52.0 % Final   03/17/2025 43.8 41.0 - 52.0 % Final   11/11/2024 39.8 (L) 41.0 - 52.0 % Final     MCV   Date/Time Value Ref Range Status   03/29/2025 07:59  (H) 80 - 100 fL Final   03/17/2025 11:02 AM 97 80 - 100 fL Final   11/11/2024 05:06 PM 99 80 - 100 fL Final     MCH   Date/Time Value Ref Range Status   03/29/2025 07:59 AM 33.9 26.0 - 34.0 pg Final   03/17/2025 11:02 AM 32.8 26.0 - 34.0 pg Final   11/11/2024 05:06 PM 32.8 26.0 - 34.0 pg Final     MCHC   Date/Time Value Ref Range Status   03/29/2025 07:59 AM 33.2 32.0 - 36.0 g/dL Final   03/17/2025 11:02 AM 34.0 32.0 - 36.0 g/dL Final   11/11/2024 05:06 PM 33.2 32.0 - 36.0 g/dL Final     RDW   Date/Time Value Ref Range Status   03/29/2025 07:59 AM 12.9 11.5 - 14.5 % Final   03/17/2025 11:02 AM 12.8 11.5 - 14.5 % Final   11/11/2024 05:06 PM 14.4 11.5 - 14.5 % Final     Platelets   Date/Time Value Ref Range Status   03/29/2025 07:59  150 - 450 x10*3/uL Final   03/17/2025 11:02  150 - 450 x10*3/uL Final   11/11/2024 05:06  150 - 450 x10*3/uL Final     MPV   Date/Time Value Ref Range Status   10/27/2023 09:38 AM 9.4 7.5 - 11.5 fL Final     Neutrophils %   Date/Time Value Ref Range Status   11/11/2024 05:06 PM 58.3 40.0 - 80.0 % Final   10/27/2023 09:38 AM 60.9 40.0 - 80.0 % Final     Immature Granulocytes %, Automated   Date/Time Value Ref Range Status   11/11/2024 05:06 PM 0.3 0.0 - 0.9 % Final     Comment:     Immature Granulocyte Count (IG) includes promyelocytes, myelocytes and metamyelocytes but does not include bands. Percent differential counts (%) should be interpreted in the context of the  absolute cell counts (cells/UL).   10/27/2023 09:38 AM 0.2 0.0 - 0.9 % Final     Comment:     Immature Granulocyte Count (IG) includes promyelocytes, myelocytes and metamyelocytes but does not include bands. Percent differential counts (%) should be interpreted in the context of the absolute cell counts (cells/UL).     Lymphocytes %   Date/Time Value Ref Range Status   11/11/2024 05:06 PM 28.7 13.0 - 44.0 % Final   10/27/2023 09:38 AM 27.2 13.0 - 44.0 % Final     Monocytes %   Date/Time Value Ref Range Status   11/11/2024 05:06 PM 9.6 2.0 - 10.0 % Final   10/27/2023 09:38 AM 9.3 2.0 - 10.0 % Final     Eosinophils %   Date/Time Value Ref Range Status   11/11/2024 05:06 PM 2.5 0.0 - 6.0 % Final   10/27/2023 09:38 AM 1.9 0.0 - 6.0 % Final     Basophils %   Date/Time Value Ref Range Status   11/11/2024 05:06 PM 0.6 0.0 - 2.0 % Final   10/27/2023 09:38 AM 0.5 0.0 - 2.0 % Final     Neutrophils Absolute   Date/Time Value Ref Range Status   11/11/2024 05:06 PM 3.96 1.20 - 7.70 x10*3/uL Final     Comment:     Percent differential counts (%) should be interpreted in the context of the absolute cell counts (cells/uL).   10/27/2023 09:38 AM 3.89 1.20 - 7.70 x10*3/uL Final     Comment:     Percent differential counts (%) should be interpreted in the context of the absolute cell counts (cells/uL).     Immature Granulocytes Absolute, Automated   Date/Time Value Ref Range Status   11/11/2024 05:06 PM 0.02 0.00 - 0.70 x10*3/uL Final   10/27/2023 09:38 AM 0.01 0.00 - 0.70 x10*3/uL Final     Lymphocytes Absolute   Date/Time Value Ref Range Status   11/11/2024 05:06 PM 1.95 1.20 - 4.80 x10*3/uL Final   10/27/2023 09:38 AM 1.73 1.20 - 4.80 x10*3/uL Final     Monocytes Absolute   Date/Time Value Ref Range Status   11/11/2024 05:06 PM 0.65 0.10 - 1.00 x10*3/uL Final   10/27/2023 09:38 AM 0.59 0.10 - 1.00 x10*3/uL Final     Eosinophils Absolute   Date/Time Value Ref Range Status   11/11/2024 05:06 PM 0.17 0.00 - 0.70 x10*3/uL Final  "  10/27/2023 09:38 AM 0.12 0.00 - 0.70 x10*3/uL Final     Basophils Absolute   Date/Time Value Ref Range Status   11/11/2024 05:06 PM 0.04 0.00 - 0.10 x10*3/uL Final   10/27/2023 09:38 AM 0.03 0.00 - 0.10 x10*3/uL Final       No components found for: \"PT\"  aPTT   Date/Time Value Ref Range Status   11/15/2017 02:19 PM 32 25 - 36 sec Final     Comment:       THE APTT IS NO LONGER USED FOR MONITORING     UNFRACTIONATED HEPARIN THERAPY.    FOR MONITORING HEPARIN THERAPY,     USE THE HEPARIN ASSAY.         Assessment/Plan      65-year-old Male referred because of PET avid left upper lobe lesion measuring at least 2.3 x 3.4 cm spiculated patient giving strong history of smoking 3 packs/day currently quitting and referred because of a clinical suspicion of non-small cell/small cell lung cancer.  No biopsy currently available.  PET scan shows localized disease and given the size I am referring patient for CT surgical evaluation and possibly and possible intervention..  Significantly the fact that patient has significant atherosclerotic disease.    Refer to Dr. Young.  RTC as needed and in 8 weeks.  CT surgery to manage biopsy and histopathology I will call complete confirmation as well as recommendations in regard to surgical approaches to this localized disease.  I am concerned about extensive atherosclerosis but I remain to be advised by CT surgery.     Diagnoses and all orders for this visit:  Lung mass  -     Referral To Hematology and Oncology  -     Carcinoembryonic Antigen; Future  -     Clinic Appointment Request Follow Up; [facility]; Future  -     Referral to Thoracic Surgery; Future           Genesis Welch MD                         "

## 2025-04-16 DIAGNOSIS — J44.9 CHRONIC OBSTRUCTIVE PULMONARY DISEASE, UNSPECIFIED COPD TYPE (MULTI): Primary | ICD-10-CM

## 2025-04-17 ENCOUNTER — OFFICE VISIT (OUTPATIENT)
Dept: VASCULAR SURGERY | Facility: CLINIC | Age: 65
End: 2025-04-17
Payer: COMMERCIAL

## 2025-04-17 VITALS
HEIGHT: 66 IN | OXYGEN SATURATION: 94 % | BODY MASS INDEX: 23.98 KG/M2 | DIASTOLIC BLOOD PRESSURE: 82 MMHG | WEIGHT: 149.2 LBS | HEART RATE: 83 BPM | SYSTOLIC BLOOD PRESSURE: 124 MMHG

## 2025-04-17 DIAGNOSIS — I73.9 PAD (PERIPHERAL ARTERY DISEASE) (CMS-HCC): Primary | ICD-10-CM

## 2025-04-17 PROCEDURE — 99211 OFF/OP EST MAY X REQ PHY/QHP: CPT | Performed by: SURGERY

## 2025-04-17 PROCEDURE — 3008F BODY MASS INDEX DOCD: CPT | Performed by: SURGERY

## 2025-04-17 PROCEDURE — 1126F AMNT PAIN NOTED NONE PRSNT: CPT | Performed by: SURGERY

## 2025-04-17 PROCEDURE — 1123F ACP DISCUSS/DSCN MKR DOCD: CPT | Performed by: SURGERY

## 2025-04-17 PROCEDURE — 1111F DSCHRG MED/CURRENT MED MERGE: CPT | Performed by: SURGERY

## 2025-04-17 PROCEDURE — 3074F SYST BP LT 130 MM HG: CPT | Performed by: SURGERY

## 2025-04-17 PROCEDURE — 3078F DIAST BP <80 MM HG: CPT | Performed by: SURGERY

## 2025-04-17 PROCEDURE — 1159F MED LIST DOCD IN RCRD: CPT | Performed by: SURGERY

## 2025-04-17 PROCEDURE — 1160F RVW MEDS BY RX/DR IN RCRD: CPT | Performed by: SURGERY

## 2025-04-17 ASSESSMENT — PAIN SCALES - GENERAL: PAINLEVEL_OUTOF10: 0-NO PAIN

## 2025-04-17 ASSESSMENT — PATIENT HEALTH QUESTIONNAIRE - PHQ9
SUM OF ALL RESPONSES TO PHQ9 QUESTIONS 1 AND 2: 0
2. FEELING DOWN, DEPRESSED OR HOPELESS: NOT AT ALL
1. LITTLE INTEREST OR PLEASURE IN DOING THINGS: NOT AT ALL

## 2025-04-17 NOTE — PATIENT INSTRUCTIONS
- RONNA and left leg duplex in 2 weeks  - Followup in 2 weeks to discuss results  - OK to return to work  - Call office [phone] with any questions or concerns

## 2025-04-17 NOTE — PROGRESS NOTES
Vascular Surgery Clinic Note    CC: Hx of LLE CLTI 3    HPI:  Iam Acevedo is 65 y.o. male with history of LLE CLTI 3 s/p left iliofemoral endarterectomy on 3/28/25. He is doing well. 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 He reports no claudication issues, denies rest pain or tissue loss. His left femoral incision has healed well. He reports no symptoms on his right leg though it is difficult to find a doppler signal in clinic today. His previous RONNA was 0.87 and he has no signs of acute limb ischemia. He is currently undergoing lung mass workup r/o malignancy.    Past Vascular History:  3/28/25 ([facility]) - left iliofemoral endarterectomy with bovine patch angioplasty, LLE angiography    Past Vascular Testing:  PVR with exercise (1/30/25) - R 0.87 (0.50), L 0.56 (0.38)    Medical History:  Problem List[1]     Meds:   Medications Ordered Prior to Encounter[2]     Allergies:   RX Allergies[3]    SH:    Social Drivers of Health     Tobacco Use: Medium Risk (4/8/2025)    Patient History     Smoking Tobacco Use: Former     Smokeless Tobacco Use: Never     Passive Exposure: Not on file   Alcohol Use: Not At Risk (3/28/2025)    AUDIT-C     Frequency of Alcohol Consumption: Never     Average Number of Drinks: Patient does not drink     Frequency of Binge Drinking: Never   Financial Resource Strain: Low Risk  (3/28/2025)    Overall Financial Resource Strain (CARDIA)     Difficulty of Paying Living Expenses: Not very hard   Food Insecurity: No Food Insecurity (3/28/2025)    Hunger Vital Sign     Worried About Running Out of Food in the Last Year: Never true     Ran Out of Food in the Last Year: Never true   Transportation Needs: No Transportation Needs (3/28/2025)    PRAPARE - Transportation     Lack of Transportation (Medical): No     Lack of Transportation (Non-Medical): No   Physical Activity: Not on file   Stress: Not on file   Social Connections: Not on file   Intimate Partner Violence: Not At Risk  (3/28/2025)    Humiliation, Afraid, Rape, and Kick questionnaire     Fear of Current or Ex-Partner: No     Emotionally Abused: No     Physically Abused: No     Sexually Abused: No   Depression: Not at risk (4/8/2025)    PHQ-2     PHQ-2 Score: 0   Housing Stability: Low Risk  (3/28/2025)    Housing Stability Vital Sign     Unable to Pay for Housing in the Last Year: No     Number of Times Moved in the Last Year: 0     Homeless in the Last Year: No   Utilities: Not At Risk (3/28/2025)    [facility] Utilities     Threatened with loss of utilities: No   Digital Equity: Not on file   Health Literacy: Not on file        FH:  Family History[4]     ROS:  All systems were reviewed and are negative except as per HPI.    Objective:  Vitals:  There were no vitals filed for this visit.     Exam:  Constitutional: normal, well appearing  HEENT: normocephalic  CV: regular rate  RESP: symmetric expansion, unlabored breathing  GI: soft, nontender, nondistended  MSK: normal ROM  INT: no lesions  PSYCH: appropriate mood  NEURO: no deficits  VASC: well healed left femoral incision. Multiphasic AT and PT on left. No doppler signals on right    Assessment & Plan:  Iam Acevedo is 65 y.o. male with history of LLE CLTI 3 s/p left iliofemoral endarterectomy on 3/28/25    - RONNA and left leg duplex in 2 weeks  - Followup in 2 weeks to discuss results  - OK to return to work    Meds  - ASA 81 mg  - Atorvastatin 40 mg    Screening/Surveillance  - RONNA and LLE arterial duplex (May 2025)    Next Followup  - 2 weeks    Mabel Turner MD               [1]   Patient Active Problem List  Diagnosis    CAD (coronary artery disease)    Claudication of lower extremity    COPD (chronic obstructive pulmonary disease) (Multi)    Hypertension    PAD (peripheral artery disease) (CMS-HCC)    Primary osteoarthritis of left wrist    Thumb pain, left    Sprain of left wrist   [2]   Current Outpatient Medications on File Prior to Visit   Medication Sig Dispense Refill     albuterol 90 mcg/actuation inhaler Inhale 1 puff every 4 hours if needed for wheezing or shortness of breath. 18 g 1    amLODIPine (Norvasc) 10 mg tablet Take 1 tablet (10 mg) by mouth once daily. 90 tablet 1    aspirin 81 mg EC tablet Take 1 tablet (81 mg) by mouth once daily.      atorvastatin (Lipitor) 40 mg tablet Take 1 tablet (40 mg) by mouth once daily. 90 tablet 1    b complex 0.4 mg tablet Take 1 tablet by mouth once daily.      ferrous sulfate 325 (65 Fe) MG EC tablet Take 1 tablet (65 mg) by mouth once daily with a meal. Do not crush, chew, or split.      fluticasone propion-salmeteroL (Advair Diskus) 250-50 mcg/dose diskus inhaler Inhale 1 puff 2 times a day. (Patient not taking: Reported on 11/11/2024) 90 each 1    fluticasone propion-salmeteroL (Advair Diskus) 250-50 mcg/dose diskus inhaler Inhale 1 puff 2 times a day. Rinse mouth with water after use to reduce aftertaste and incidence of candidiasis. Do not swallow. 60 each 0    fluticasone propion-salmeteroL (Advair Diskus) 250-50 mcg/dose diskus inhaler Inhale 1 puff 2 times a day. 180 each 1    lisinopriL-hydrochlorothiazide 20-12.5 mg tablet Take 1 tablet by mouth once daily. 90 tablet 1    multivitamin with minerals (multivitamin-iron-folic acid) tablet Take 1 tablet by mouth once daily.      nicotine (Nicoderm CQ) 14 mg/24 hr patch Place 1 patch over 24 hours on the skin once every 24 hours. 30 patch 0     No current facility-administered medications on file prior to visit.   [3] No Known Allergies  [4] No family history on file.

## 2025-04-22 ENCOUNTER — TELEMEDICINE (OUTPATIENT)
Dept: PHARMACY | Facility: HOSPITAL | Age: 65
End: 2025-04-22
Payer: COMMERCIAL

## 2025-04-22 ENCOUNTER — APPOINTMENT (OUTPATIENT)
Dept: CARDIOTHORACIC SURGERY | Facility: HOSPITAL | Age: 65
End: 2025-04-22
Payer: COMMERCIAL

## 2025-04-22 DIAGNOSIS — J44.9 CHRONIC OBSTRUCTIVE PULMONARY DISEASE, UNSPECIFIED COPD TYPE (MULTI): ICD-10-CM

## 2025-04-22 NOTE — PROGRESS NOTES
"  Pharmacy Post-Discharge Visit    Iam Acevedo \"Brandon\" is a 65 y.o. male was referred to Clinical Pharmacy Team to complete a post-discharge medication optimization and monitoring visit.  The patient was referred for their COPD and Medication Visit.    Pt is here for First appointment.   Referring Provider: Ángel Mccarty APRN*  PCP: KAILEY Neville-CNP, last visit: 11/2024, next visit: not scheduled    Admission Date: 3/28/25  Discharge Date: 3/29/25  Discharge Diagnosis: PAD    Subjective   HPI  PMH significant for COPD.    Medication System Management  Patients preferred pharmacy:   [facility] [address] [place] [address] [address] [address]  Phone: [phone] Fax: [phone]    [facility] - [place] [id number] [facility] [address]  Phone: [phone] Fax: [phone]  Adherence/Organization: No current concerns  Affordability/Accessibility: No current concerns  Adverse Effects: No current concerns    Drug Interactions  No relevant drug interactions were noted.    COPD  Patient has been diagnosed with: COPD  Does patient see pulmonology? No  has not had PFT's completed in last 2 years    Current pulmonary medications include:  Advair Diskus  Albuterol prn    Appropriate Inhaler Technique  Rescue inhaler use: Assessed and appropriate  Maintenance inhaler use: Assessed and appropriate  Rinses mouth after use: Yes    Symptom Assessment  Current symptoms:  none  Aggravating Factors: change in weather  Alleviating Factors: inhaler use    Hospitalizations for pulmonary exacerbation in past year: 0  Moderate exacerbations (without hospitalization) in past year: 0    Preventative Care  Immunizations Needed: Annual Flu, RSV, Prevnar, Shingrix, and Tdap  Tobacco Use: former smoker, quit 2/2025       Objective   Allergies[1]  Social History     Social History Narrative    Not on file      Medication " "Review  Current Outpatient Medications   Medication Instructions    albuterol 90 mcg/actuation inhaler 1 puff, inhalation, Every 4 hours PRN    amLODIPine (NORVASC) 10 mg, oral, Daily    aspirin 81 mg, Daily    atorvastatin (LIPITOR) 40 mg, oral, Daily    b complex 0.4 mg tablet 1 tablet, Daily    ferrous sulfate 65 mg, Daily with breakfast    fluticasone propion-salmeteroL (Advair Diskus) 250-50 mcg/dose diskus inhaler 1 puff, inhalation, 2 times daily RT    lisinopriL-hydrochlorothiazide 20-12.5 mg tablet 1 tablet, oral, Daily    multivitamin with minerals (multivitamin-iron-folic acid) tablet 1 tablet, Daily    nicotine (Nicoderm CQ) 14 mg/24 hr patch 1 patch, transdermal, Every 24 hours      Vitals  BP Readings from Last 2 Encounters:   04/17/25 124/82   04/08/25 119/80     Labs  A1C  Lab Results   Component Value Date    HGBA1C 5.4 11/11/2024    HGBA1C 5.0 10/21/2022     BMP  Lab Results   Component Value Date    CALCIUM 8.4 (L) 03/29/2025     (L) 03/29/2025    K 4.0 03/29/2025    CO2 26 03/29/2025     03/29/2025    BUN 11 03/29/2025    CREATININE 0.69 03/29/2025    EGFR >90 03/29/2025     LFTs  Lab Results   Component Value Date    ALT 20 11/11/2024    AST 22 11/11/2024    ALKPHOS 67 11/11/2024    BILITOT 0.4 11/11/2024     FLP  Lab Results   Component Value Date    TRIG 59 11/11/2024    CHOL 164 11/11/2024    LDLF 49 10/21/2022    LDLCALC 62 11/11/2024    HDL 89.8 11/11/2024     Urine Microalbumin  No results found for: \"MICROALBCREA\"  Wt Readings from Last 3 Encounters:   04/17/25 67.7 kg (149 lb 3.2 oz)   04/08/25 67.2 kg (148 lb 2.4 oz)   03/29/25 71.1 kg (156 lb 12 oz)      BMI Readings from Last 1 Encounters:   04/17/25 24.08 kg/m²       Assessment/Plan   Problem List Items Addressed This Visit          Pulmonary and Pneumonias    COPD (chronic obstructive pulmonary disease) (Multi)     Medication review completed and outlined below. All patient questions and concerns addressed at this time. "   Meds added: none  Meds removed: 2 Advair Diskus (duplicates)  Meds changes: none    COPD  Patient with COPD that is well controlled and stable. He is having no concerns with this. The medication is affordable and he is stable per report today.      Patient Education:  Counseled patient on relevant medication mechanisms of action, expectations, side effects, duration of therapy, contraindications, administration, and monitoring parameters.  All questions and concerns addressed. Contact pharmacist with any further questions or concerns prior to next appointment.    Clinical Pharmacist follow-up: as needed, Telehealth visit  Patient is not followed in [facility].    Thank you,  Lobito DuffyD, [facility]  Clinical Pharmacy Specialist-Primary Care  [phone]    Continue all meds under the continuation of care with the referring provider and clinical pharmacy team.  Verbal consent to manage patient's drug therapy was obtained from the patient. They were informed they may decline to participate or withdraw from participation in pharmacy services at any time.         [1] No Known Allergies

## 2025-05-01 ENCOUNTER — HOSPITAL ENCOUNTER (OUTPATIENT)
Dept: RADIOLOGY | Facility: HOSPITAL | Age: 65
Discharge: HOME | End: 2025-05-01
Payer: COMMERCIAL

## 2025-05-01 ENCOUNTER — HOSPITAL ENCOUNTER (OUTPATIENT)
Dept: RADIOLOGY | Facility: HOSPITAL | Age: 65
Discharge: HOME | End: 2025-05-01
Attending: INTERNAL MEDICINE | Admitting: INTERNAL MEDICINE
Payer: COMMERCIAL

## 2025-05-01 VITALS
DIASTOLIC BLOOD PRESSURE: 79 MMHG | BODY MASS INDEX: 23.99 KG/M2 | OXYGEN SATURATION: 97 % | HEART RATE: 91 BPM | WEIGHT: 149.25 LBS | HEIGHT: 66 IN | RESPIRATION RATE: 16 BRPM | SYSTOLIC BLOOD PRESSURE: 124 MMHG | TEMPERATURE: 98 F

## 2025-05-01 DIAGNOSIS — R91.8 LUNG MASS: Primary | ICD-10-CM

## 2025-05-01 DIAGNOSIS — J93.9 PNEUMOTHORAX: ICD-10-CM

## 2025-05-01 LAB
ERYTHROCYTE [DISTWIDTH] IN BLOOD BY AUTOMATED COUNT: 13.2 % (ref 11.5–14.5)
HCT VFR BLD AUTO: 39.4 % (ref 41–52)
HGB BLD-MCNC: 13.5 G/DL (ref 13.5–17.5)
INR PPP: 1 (ref 0.9–1.2)
MCH RBC QN AUTO: 33.9 PG (ref 26–34)
MCHC RBC AUTO-ENTMCNC: 34.3 G/DL (ref 32–36)
MCV RBC AUTO: 99 FL (ref 80–100)
NRBC BLD-RTO: 0 /100 WBCS (ref 0–0)
PLATELET # BLD AUTO: 237 X10*3/UL (ref 150–450)
PROTHROMBIN TIME: 10.9 SECONDS (ref 9.3–12.7)
RBC # BLD AUTO: 3.98 X10*6/UL (ref 4.5–5.9)
WBC # BLD AUTO: 6.9 X10*3/UL (ref 4.4–11.3)

## 2025-05-01 PROCEDURE — 36415 COLL VENOUS BLD VENIPUNCTURE: CPT | Performed by: NURSE PRACTITIONER

## 2025-05-01 PROCEDURE — 32557 INSERT CATH PLEURA W/ IMAGE: CPT

## 2025-05-01 PROCEDURE — 7100000010 HC PHASE TWO TIME - EACH INCREMENTAL 1 MINUTE

## 2025-05-01 PROCEDURE — C1729 CATH, DRAINAGE: HCPCS

## 2025-05-01 PROCEDURE — C1769 GUIDE WIRE: HCPCS

## 2025-05-01 PROCEDURE — 85027 COMPLETE CBC AUTOMATED: CPT | Performed by: NURSE PRACTITIONER

## 2025-05-01 PROCEDURE — 2720000007 HC OR 272 NO HCPCS

## 2025-05-01 PROCEDURE — 71045 X-RAY EXAM CHEST 1 VIEW: CPT

## 2025-05-01 PROCEDURE — 32408 CORE NDL BX LNG/MED PERQ: CPT

## 2025-05-01 PROCEDURE — 7100000009 HC PHASE TWO TIME - INITIAL BASE CHARGE

## 2025-05-01 PROCEDURE — 99152 MOD SED SAME PHYS/QHP 5/>YRS: CPT

## 2025-05-01 PROCEDURE — 85610 PROTHROMBIN TIME: CPT | Performed by: NURSE PRACTITIONER

## 2025-05-01 PROCEDURE — 2500000004 HC RX 250 GENERAL PHARMACY W/ HCPCS (ALT 636 FOR OP/ED): Performed by: RADIOLOGY

## 2025-05-01 PROCEDURE — 99152 MOD SED SAME PHYS/QHP 5/>YRS: CPT | Performed by: RADIOLOGY

## 2025-05-01 RX ORDER — FENTANYL CITRATE 50 UG/ML
INJECTION, SOLUTION INTRAMUSCULAR; INTRAVENOUS
Status: COMPLETED | OUTPATIENT
Start: 2025-05-01 | End: 2025-05-01

## 2025-05-01 RX ORDER — MIDAZOLAM HYDROCHLORIDE 1 MG/ML
INJECTION, SOLUTION INTRAMUSCULAR; INTRAVENOUS
Status: COMPLETED | OUTPATIENT
Start: 2025-05-01 | End: 2025-05-01

## 2025-05-01 RX ORDER — LIDOCAINE HYDROCHLORIDE 10 MG/ML
INJECTION, SOLUTION EPIDURAL; INFILTRATION; INTRACAUDAL; PERINEURAL
Status: COMPLETED | OUTPATIENT
Start: 2025-05-01 | End: 2025-05-01

## 2025-05-01 RX ADMIN — FENTANYL CITRATE 50 MCG: 0.05 INJECTION, SOLUTION INTRAMUSCULAR; INTRAVENOUS at 10:04

## 2025-05-01 RX ADMIN — MIDAZOLAM 1 MG: 1 INJECTION INTRAMUSCULAR; INTRAVENOUS at 10:04

## 2025-05-01 RX ADMIN — LIDOCAINE HYDROCHLORIDE 6 ML: 10 INJECTION, SOLUTION EPIDURAL; INFILTRATION; INTRACAUDAL; PERINEURAL at 10:07

## 2025-05-01 ASSESSMENT — PAIN SCALES - GENERAL
PAINLEVEL_OUTOF10: 0 - NO PAIN

## 2025-05-01 ASSESSMENT — PAIN - FUNCTIONAL ASSESSMENT: PAIN_FUNCTIONAL_ASSESSMENT: 0-10

## 2025-05-01 NOTE — DISCHARGE INSTRUCTIONS
No NSAIDS (ibuprofen, naproxen, aleve, advil) or aspirin for 48 hours after procedure. You may take tylenol if needed but do not exceed 10 tablets in 24 hour period    Patient and Family Education  What is a Needle Biopsy of the Lung?  A needle biopsy of the lung is one method used to help doctors diagnose lung disease. It helps  them decide how serious the lung disease is. A small piece of tissue is taken from your lung  using a special kind of needle. The tissue is sent to the lab to be looked at under a microscope.  The procedure can take up to 1 hour.  Before the Test:  ? If you take blood thinning medications, you Must ask your doctor when you should stop  taking the medicine. You may need to stop taking the medicine up to 7 days prior to the  test.  ? You will have a blood sample drawn.  ? Do Not Drink or Eat Anything after midnight the day before the test (unless your  doctor tells you otherwise).  ? You may take medications with a small amount of clear liquid.  ? If you have diabetes, ask your Radiologist or Radiology Nurse if you should take your  medicine or adjust the dosage before the test.  ? If you have an allergy to iodine or iodinated contrast, your primary doctor may prescribe  special pills to take before the test.  During the Test:  ? You will be lying on an X-Ray table.  ? You may be given medicine that will make you drowsy.  ? You will be given a local anesthetic to numb the biopsy site.  ? You will feel pressure during the biopsy.  After the Test:  ? You will remain on bed rest 2 to 4 hours.  ? You blood pressure, pulse and biopsy site will be checked often.  ? You will have 2 Chest X-Rays after the procedure.  ? If a large sample of tissue needs to be taken, you may be admitted to the hospital for  observation.     Page 2 of 2  Follow these Guidelines for a Safer Recovery:  ? Activity:  o Limit activity for 24 hours after the test.  o Avoid intense exercise and contact sports for 24 hours.  o  No driving for 24 hours. You will need someone to drive you home if you are an  outpatient.  o Do not do any heavy lifting, over 10 pounds, for 1 week.  ? Diet:  o You may resume your normal diet.  ? Medicines:  o If you take blood thinning medications, ask your doctor if you should stop the  medicine for 3 days after the test.  o You may take your other medicines as ordered by your doctor.  Call your Doctor Right Away if you have:  ? Bleeding from the biopsy site.  ? Shortness of breath or trouble breathing.  ? Increased pain at the biopsy site.  ? Dizziness or fainting.  ? Heart beating faster than normal.  ? If you are not able to contact your primary doctor, go to the nearest Emergency Room.  Also, Call your Doctor if you have:  ? Redness, swelling, drainage, or fluid at the biopsy site.  ? Temperature of 100.4 F degrees or higher.  ? Pain or tenderness at the procedure site.  ? Uncontrolled cough or persistent cough.  ? Any questions.     How to Reach your Doctor:  Call Dr. Giang at [phone] with problems or questions.

## 2025-05-06 LAB
LAB AP ASR DISCLAIMER: NORMAL
LABORATORY COMMENT REPORT: NORMAL
PATH REPORT.COMMENTS IMP SPEC: NORMAL
PATH REPORT.FINAL DX SPEC: NORMAL
PATH REPORT.GROSS SPEC: NORMAL
PATH REPORT.RELEVANT HX SPEC: NORMAL
PATH REPORT.TOTAL CANCER: NORMAL

## 2025-05-07 ENCOUNTER — APPOINTMENT (OUTPATIENT)
Dept: VASCULAR MEDICINE | Facility: CLINIC | Age: 65
End: 2025-05-07
Payer: COMMERCIAL

## 2025-05-08 ENCOUNTER — ANCILLARY PROCEDURE (OUTPATIENT)
Dept: VASCULAR MEDICINE | Facility: CLINIC | Age: 65
End: 2025-05-08
Payer: COMMERCIAL

## 2025-05-08 DIAGNOSIS — I73.9 PAD (PERIPHERAL ARTERY DISEASE): ICD-10-CM

## 2025-05-08 PROCEDURE — 93922 UPR/L XTREMITY ART 2 LEVELS: CPT

## 2025-05-08 PROCEDURE — 93926 LOWER EXTREMITY STUDY: CPT | Performed by: INTERNAL MEDICINE

## 2025-05-08 PROCEDURE — 93922 UPR/L XTREMITY ART 2 LEVELS: CPT | Performed by: INTERNAL MEDICINE

## 2025-05-08 PROCEDURE — 93926 LOWER EXTREMITY STUDY: CPT

## 2025-05-14 LAB
ELECTRONICALLY SIGNED BY: NORMAL
FOCUSED SOLID TUMOR DNA/RNA RESULTS: NORMAL

## 2025-05-14 PROCEDURE — G0452 MOLECULAR PATHOLOGY INTERPR: HCPCS | Performed by: INTERNAL MEDICINE

## 2025-05-14 PROCEDURE — 81458 SO GSAP DNA CPY NMBR&MCRSTL: CPT | Performed by: INTERNAL MEDICINE

## 2025-05-15 ENCOUNTER — OFFICE VISIT (OUTPATIENT)
Dept: VASCULAR SURGERY | Facility: CLINIC | Age: 65
End: 2025-05-15
Payer: COMMERCIAL

## 2025-05-15 VITALS
OXYGEN SATURATION: 94 % | DIASTOLIC BLOOD PRESSURE: 87 MMHG | HEART RATE: 78 BPM | WEIGHT: 148.1 LBS | BODY MASS INDEX: 23.8 KG/M2 | SYSTOLIC BLOOD PRESSURE: 143 MMHG | HEIGHT: 66 IN

## 2025-05-15 DIAGNOSIS — C34.90 MALIGNANT NEOPLASM OF LUNG, UNSPECIFIED LATERALITY, UNSPECIFIED PART OF LUNG (MULTI): ICD-10-CM

## 2025-05-15 DIAGNOSIS — I73.9 PAD (PERIPHERAL ARTERY DISEASE): Primary | ICD-10-CM

## 2025-05-15 PROCEDURE — 1159F MED LIST DOCD IN RCRD: CPT | Performed by: SURGERY

## 2025-05-15 PROCEDURE — 3077F SYST BP >= 140 MM HG: CPT | Performed by: SURGERY

## 2025-05-15 PROCEDURE — 1126F AMNT PAIN NOTED NONE PRSNT: CPT | Performed by: SURGERY

## 2025-05-15 PROCEDURE — 99211 OFF/OP EST MAY X REQ PHY/QHP: CPT | Performed by: SURGERY

## 2025-05-15 PROCEDURE — 3008F BODY MASS INDEX DOCD: CPT | Performed by: SURGERY

## 2025-05-15 PROCEDURE — 3079F DIAST BP 80-89 MM HG: CPT | Performed by: SURGERY

## 2025-05-15 PROCEDURE — 1160F RVW MEDS BY RX/DR IN RCRD: CPT | Performed by: SURGERY

## 2025-05-15 ASSESSMENT — PAIN SCALES - GENERAL: PAINLEVEL_OUTOF10: 0-NO PAIN

## 2025-05-15 NOTE — H&P (VIEW-ONLY)
Per Pam Pena MD refer to Ortho (foot and ankle) Dr. Arroyo.      Called patient and gave her MD's recommendation for referral to ortho.  Patient was given the name (Dr. Arroyo) in [place] and the number to call and schedule the appointment.  Patient verbalized understanding and had no further questions.     Vascular Surgery Clinic Note    CC: Hx of LLE CLTI 3; RLE CLTI 3    HPI:  Iam Acevedo is 65 y.o. male with history of LLE CLTI 3 s/p left iliofemoral endarterectomy on 3/28/25. His left leg has greatly improved since revascularization and he continues to deny any claudication, rest pain or tissue loss with that leg. However since his last visit on 4/17/25 he has reported new short distance claudication with his right leg. Sometimes wakes up from night. He is able to ambulate 50 feet before onset of pain. Pain is relieved with rest. His RONNA is significantly decreased today from last testing on the right side    Of note, he is currently undergoing workup of LLL lung mass. He did undergo biopsy positive for adenocarcinoma. He is set to see Dr. Young on Monday regarding further management.    Past Vascular History:  3/28/25 (Turn[facility]) - left iliofemoral endarterectomy with bovine patch angioplasty, LLE angiography     Past Vascular Testing:  RONNA (5/8/25) - R 0.53 (0), L 0.78 (0.40)  PVR with exercise (1/30/25) - R 0.87 (0.50), L 0.56 (0.38)      Medical History:  Problem List[1]     Meds:   Medications Ordered Prior to Encounter[2]     Allergies:   RX Allergies[3]    SH:    Social Drivers of Health     Tobacco Use: Medium Risk (5/15/2025)    Patient History     Smoking Tobacco Use: Former     Smokeless Tobacco Use: Never     Passive Exposure: Not on file   Alcohol Use: Not At Risk (3/28/2025)    AUDIT-C     Frequency of Alcohol Consumption: Never     Average Number of Drinks: Patient does not drink     Frequency of Binge Drinking: Never   Financial Resource Strain: Low Risk  (3/28/2025)    Overall Financial Resource Strain (CARDIA)     Difficulty of Paying Living Expenses: Not very hard   Food Insecurity: No Food Insecurity (3/28/2025)    Hunger Vital Sign     Worried About Running Out of Food in the Last Year: Never true     Ran Out of Food in the Last Year: Never true   Transportation Needs: No Transportation  Needs (3/28/2025)    PRAPARE - Transportation     Lack of Transportation (Medical): No     Lack of Transportation (Non-Medical): No   Physical Activity: Not on file   Stress: Not on file   Social Connections: Not on file   Intimate Partner Violence: Not At Risk (3/28/2025)    Humiliation, Afraid, Rape, and Kick questionnaire     Fear of Current or Ex-Partner: No     Emotionally Abused: No     Physically Abused: No     Sexually Abused: No   Depression: Not at risk (5/15/2025)    PHQ-2     PHQ-2 Score: 0   Housing Stability: Low Risk  (3/28/2025)    Housing Stability Vital Sign     Unable to Pay for Housing in the Last Year: No     Number of Times Moved in the Last Year: 0     Homeless in the Last Year: No   Utilities: Not At Risk (3/28/2025)    [facility] Utilities     Threatened with loss of utilities: No   Digital Equity: Not on file   Health Literacy: Not on file        FH:  Family History[4]     ROS:  All systems were reviewed and are negative except as per HPI.    Objective:  Vitals:  Vitals:    05/15/25 0803   BP: 143/87   Pulse: 78   SpO2:         Exam:  Constitutional: normal, well appearing  HEENT: normocephalic  CV: regular rate  RESP: symmetric expansion, unlabored breathing  GI: soft, nontender, nondistended  MSK: normal ROM  INT: no lesions  PSYCH: appropriate mood  NEURO: no deficits  VASC: warm, no signs of mottling or pallor    Assessment & Plan:  Iam Acevedo is 65 y.o. male with history of LLE CLTI 3 s/p left iliofemoral endarterectomy on 3/28/25. Now with RLE CLTI 3    - Left leg is improved post intervention  - Right leg RONNA has dropped significantly since last visit. He is having short distance claudication. Concern for malignancy-induced hypercoagulability and will start on Eliquis  - OK to hold Eliquis for 48 hours prior to any other surgeries or procedures necessary  - Will followup with Dr. Young regarding lung plan and coordination of vascular surgery procedure timing  - Patient to call  after visit with thoracic surgery  - Will determine followup and plan afterwards    Meds  - ASA 81 mg  - Eliquis 5 mg  - Atorvastatin 40 mg    Screening/Surveillance  - Pending    Next Followup  - Pending    Mabel Turner MD               [1]   Patient Active Problem List  Diagnosis    CAD (coronary artery disease)    Claudication of lower extremity    COPD (chronic obstructive pulmonary disease) (Multi)    Hypertension    PAD (peripheral artery disease) (CMS-[facility])    Primary osteoarthritis of left wrist    Thumb pain, left    Sprain of left wrist    Lung mass   [2]   Current Outpatient Medications on File Prior to Visit   Medication Sig Dispense Refill    albuterol 90 mcg/actuation inhaler Inhale 1 puff every 4 hours if needed for wheezing or shortness of breath. 18 g 1    amLODIPine (Norvasc) 10 mg tablet Take 1 tablet (10 mg) by mouth once daily. 90 tablet 1    aspirin 81 mg EC tablet Take 1 tablet (81 mg) by mouth once daily.      atorvastatin (Lipitor) 40 mg tablet Take 1 tablet (40 mg) by mouth once daily. 90 tablet 1    b complex 0.4 mg tablet Take 1 tablet by mouth once daily.      ferrous sulfate 325 (65 Fe) MG EC tablet Take 1 tablet (65 mg) by mouth once daily with a meal. Do not crush, chew, or split.      fluticasone propion-salmeteroL (Advair Diskus) 250-50 mcg/dose diskus inhaler Inhale 1 puff 2 times a day. 180 each 1    lisinopriL-hydrochlorothiazide 20-12.5 mg tablet Take 1 tablet by mouth once daily. 90 tablet 1    multivitamin with minerals (multivitamin-iron-folic acid) tablet Take 1 tablet by mouth once daily.      nicotine (Nicoderm CQ) 14 mg/24 hr patch Place 1 patch over 24 hours on the skin once every 24 hours. 30 patch 0     No current facility-administered medications on file prior to visit.   [3] No Known Allergies  [4] No family history on file.

## 2025-05-15 NOTE — PATIENT INSTRUCTIONS
- Left leg is improved post intervention  - Right leg RONNA has dropped significantly since last visit. He is having short distance claudication. Concern for malignancy-induced hypercoagulability and will start on Eliquis  - OK to hold Eliquis for 48 hours prior to any other surgeries or procedures necessary  - Will followup with Dr. Young regarding lung plan and coordination of vascular surgery procedure timing  - Patient to call after visit with thoracic surgery  - Will determine followup and plan afterwards  - Call office [phone] with any questions or concerns

## 2025-05-15 NOTE — PROGRESS NOTES
Vascular Surgery Clinic Note    CC: Hx of LLE CLTI 3; RLE CLTI 3    HPI:  Iam Acevedo is 65 y.o. male with history of LLE CLTI 3 s/p left iliofemoral endarterectomy on 3/28/25. His left leg has greatly improved since revascularization and he continues to deny any claudication, rest pain or tissue loss with that leg. However since his last visit on 4/17/25 he has reported new short distance claudication with his right leg. Sometimes wakes up from night. He is able to ambulate 50 feet before onset of pain. Pain is relieved with rest. His RONNA is significantly decreased today from last testing on the right side    Of note, he is currently undergoing workup of LLL lung mass. He did undergo biopsy positive for adenocarcinoma. He is set to see Dr. Young on Monday regarding further management.    Past Vascular History:  3/28/25 ([facility]) - left iliofemoral endarterectomy with bovine patch angioplasty, LLE angiography     Past Vascular Testing:  RONNA (5/8/25) - R 0.53 (0), L 0.78 (0.40)  PVR with exercise (1/30/25) - R 0.87 (0.50), L 0.56 (0.38)      Medical History:  Problem List[1]     Meds:   Medications Ordered Prior to Encounter[2]     Allergies:   RX Allergies[3]    SH:    Social Drivers of Health     Tobacco Use: Medium Risk (5/15/2025)    Patient History     Smoking Tobacco Use: Former     Smokeless Tobacco Use: Never     Passive Exposure: Not on file   Alcohol Use: Not At Risk (3/28/2025)    AUDIT-C     Frequency of Alcohol Consumption: Never     Average Number of Drinks: Patient does not drink     Frequency of Binge Drinking: Never   Financial Resource Strain: Low Risk  (3/28/2025)    Overall Financial Resource Strain (CARDIA)     Difficulty of Paying Living Expenses: Not very hard   Food Insecurity: No Food Insecurity (3/28/2025)    Hunger Vital Sign     Worried About Running Out of Food in the Last Year: Never true     Ran Out of Food in the Last Year: Never true   Transportation Needs: No Transportation  Needs (3/28/2025)    PRAPARE - Transportation     Lack of Transportation (Medical): No     Lack of Transportation (Non-Medical): No   Physical Activity: Not on file   Stress: Not on file   Social Connections: Not on file   Intimate Partner Violence: Not At Risk (3/28/2025)    Humiliation, Afraid, Rape, and Kick questionnaire     Fear of Current or Ex-Partner: No     Emotionally Abused: No     Physically Abused: No     Sexually Abused: No   Depression: Not at risk (5/15/2025)    PHQ-2     PHQ-2 Score: 0   Housing Stability: Low Risk  (3/28/2025)    Housing Stability Vital Sign     Unable to Pay for Housing in the Last Year: No     Number of Times Moved in the Last Year: 0     Homeless in the Last Year: No   Utilities: Not At Risk (3/28/2025)    [facility] Utilities     Threatened with loss of utilities: No   Digital Equity: Not on file   Health Literacy: Not on file        FH:  Family History[4]     ROS:  All systems were reviewed and are negative except as per HPI.    Objective:  Vitals:  Vitals:    05/15/25 0803   BP: 143/87   Pulse: 78   SpO2:         Exam:  Constitutional: normal, well appearing  HEENT: normocephalic  CV: regular rate  RESP: symmetric expansion, unlabored breathing  GI: soft, nontender, nondistended  MSK: normal ROM  INT: no lesions  PSYCH: appropriate mood  NEURO: no deficits  VASC: warm, no signs of mottling or pallor    Assessment & Plan:  Iam Acevedo is 65 y.o. male with history of LLE CLTI 3 s/p left iliofemoral endarterectomy on 3/28/25. Now with RLE CLTI 3    - Left leg is improved post intervention  - Right leg RONNA has dropped significantly since last visit. He is having short distance claudication. Concern for malignancy-induced hypercoagulability and will start on Eliquis  - OK to hold Eliquis for 48 hours prior to any other surgeries or procedures necessary  - Will followup with Dr. Young regarding lung plan and coordination of vascular surgery procedure timing  - Patient to call  after visit with thoracic surgery  - Will determine followup and plan afterwards    Meds  - ASA 81 mg  - Eliquis 5 mg  - Atorvastatin 40 mg    Screening/Surveillance  - Pending    Next Followup  - Pending    Mabel Turner MD               [1]   Patient Active Problem List  Diagnosis    CAD (coronary artery disease)    Claudication of lower extremity    COPD (chronic obstructive pulmonary disease) (Multi)    Hypertension    PAD (peripheral artery disease) (CMS-[facility])    Primary osteoarthritis of left wrist    Thumb pain, left    Sprain of left wrist    Lung mass   [2]   Current Outpatient Medications on File Prior to Visit   Medication Sig Dispense Refill    albuterol 90 mcg/actuation inhaler Inhale 1 puff every 4 hours if needed for wheezing or shortness of breath. 18 g 1    amLODIPine (Norvasc) 10 mg tablet Take 1 tablet (10 mg) by mouth once daily. 90 tablet 1    aspirin 81 mg EC tablet Take 1 tablet (81 mg) by mouth once daily.      atorvastatin (Lipitor) 40 mg tablet Take 1 tablet (40 mg) by mouth once daily. 90 tablet 1    b complex 0.4 mg tablet Take 1 tablet by mouth once daily.      ferrous sulfate 325 (65 Fe) MG EC tablet Take 1 tablet (65 mg) by mouth once daily with a meal. Do not crush, chew, or split.      fluticasone propion-salmeteroL (Advair Diskus) 250-50 mcg/dose diskus inhaler Inhale 1 puff 2 times a day. 180 each 1    lisinopriL-hydrochlorothiazide 20-12.5 mg tablet Take 1 tablet by mouth once daily. 90 tablet 1    multivitamin with minerals (multivitamin-iron-folic acid) tablet Take 1 tablet by mouth once daily.      nicotine (Nicoderm CQ) 14 mg/24 hr patch Place 1 patch over 24 hours on the skin once every 24 hours. 30 patch 0     No current facility-administered medications on file prior to visit.   [3] No Known Allergies  [4] No family history on file.

## 2025-05-19 ENCOUNTER — APPOINTMENT (OUTPATIENT)
Facility: CLINIC | Age: 65
End: 2025-05-19
Payer: COMMERCIAL

## 2025-05-19 VITALS
BODY MASS INDEX: 23.63 KG/M2 | OXYGEN SATURATION: 98 % | SYSTOLIC BLOOD PRESSURE: 164 MMHG | WEIGHT: 147 LBS | HEIGHT: 66 IN | RESPIRATION RATE: 18 BRPM | DIASTOLIC BLOOD PRESSURE: 60 MMHG | HEART RATE: 64 BPM

## 2025-05-19 DIAGNOSIS — R91.1 LUNG NODULE: ICD-10-CM

## 2025-05-19 DIAGNOSIS — C34.32 MALIGNANT NEOPLASM OF LOWER LOBE OF LEFT LUNG (MULTI): Primary | ICD-10-CM

## 2025-05-19 DIAGNOSIS — I70.213 ATHEROSCLEROSIS OF NATIVE ARTERIES OF EXTREMITIES WITH INTERMITTENT CLAUDICATION, BILATERAL LEGS: Primary | ICD-10-CM

## 2025-05-19 DIAGNOSIS — Z09 S/P LUNG SURGERY, FOLLOW-UP EXAM: ICD-10-CM

## 2025-05-19 PROCEDURE — 3078F DIAST BP <80 MM HG: CPT | Performed by: THORACIC SURGERY (CARDIOTHORACIC VASCULAR SURGERY)

## 2025-05-19 PROCEDURE — 3008F BODY MASS INDEX DOCD: CPT | Performed by: THORACIC SURGERY (CARDIOTHORACIC VASCULAR SURGERY)

## 2025-05-19 PROCEDURE — 3077F SYST BP >= 140 MM HG: CPT | Performed by: THORACIC SURGERY (CARDIOTHORACIC VASCULAR SURGERY)

## 2025-05-19 PROCEDURE — 1036F TOBACCO NON-USER: CPT | Performed by: THORACIC SURGERY (CARDIOTHORACIC VASCULAR SURGERY)

## 2025-05-19 PROCEDURE — 99205 OFFICE O/P NEW HI 60 MIN: CPT | Performed by: THORACIC SURGERY (CARDIOTHORACIC VASCULAR SURGERY)

## 2025-05-19 PROCEDURE — 1159F MED LIST DOCD IN RCRD: CPT | Performed by: THORACIC SURGERY (CARDIOTHORACIC VASCULAR SURGERY)

## 2025-05-19 PROCEDURE — 1126F AMNT PAIN NOTED NONE PRSNT: CPT | Performed by: THORACIC SURGERY (CARDIOTHORACIC VASCULAR SURGERY)

## 2025-05-19 PROCEDURE — 99215 OFFICE O/P EST HI 40 MIN: CPT | Performed by: THORACIC SURGERY (CARDIOTHORACIC VASCULAR SURGERY)

## 2025-05-19 ASSESSMENT — LIFESTYLE VARIABLES
AUDIT-C TOTAL SCORE: 4
HOW OFTEN DO YOU HAVE A DRINK CONTAINING ALCOHOL: 4 OR MORE TIMES A WEEK
HOW OFTEN DO YOU HAVE SIX OR MORE DRINKS ON ONE OCCASION: NEVER
SKIP TO QUESTIONS 9-10: 1
HOW MANY STANDARD DRINKS CONTAINING ALCOHOL DO YOU HAVE ON A TYPICAL DAY: 1 OR 2

## 2025-05-19 ASSESSMENT — ENCOUNTER SYMPTOMS
CHOKING: 0
DIAPHORESIS: 0
FEVER: 0
OCCASIONAL FEELINGS OF UNSTEADINESS: 0
SHORTNESS OF BREATH: 0
STRIDOR: 0
DEPRESSION: 0
ABDOMINAL PAIN: 0
FATIGUE: 0
NAUSEA: 0
CHILLS: 0
PSYCHIATRIC NEGATIVE: 1
DIARRHEA: 0
CHEST TIGHTNESS: 0
EYES NEGATIVE: 1
ALLERGIC/IMMUNOLOGIC NEGATIVE: 1
HEMATOLOGIC/LYMPHATIC NEGATIVE: 1
PALPITATIONS: 0
ABDOMINAL DISTENTION: 0
WHEEZING: 0
ENDOCRINE NEGATIVE: 1
MUSCULOSKELETAL NEGATIVE: 1
APPETITE CHANGE: 0
UNEXPECTED WEIGHT CHANGE: 0
LOSS OF SENSATION IN FEET: 0
NEUROLOGICAL NEGATIVE: 1
CONSTIPATION: 0
VOMITING: 0
COUGH: 0

## 2025-05-19 ASSESSMENT — PAIN SCALES - GENERAL: PAINLEVEL_OUTOF10: 0-NO PAIN

## 2025-05-19 NOTE — PROGRESS NOTES
"Subjective   Patient ID: Iam Acevedo \"Ignacio" is a 65 y.o. male who presents for Consult.  HPI    65-year-old male with history of peripheral vascular disease who was found to have a left lower lobe nodule.  This nodule measures 3 x 2.6 cm.  PET scan showing SUV max of 8.9 with negative lymph nodes.  He underwent a biopsy that revealed an adenocarcinoma.  His last echo showed an EF of 65%.  He is a very active man who does not endorse any shortness of breath.  We had a discussion about the next steps.  Will obtain an MRI of the brain to complete his workup.  He will also obtain PFTs to assess his candidacy for surgery.  I do not foresee any contraindications for surgery on those 2.  We discussed the mechanics of a robotic assisted left lower lobe lobectomy with lymphadenectomy.  We discussed length of stay potential complications and expected recovery.  Will plan to do on June 19 at [facility].    Review of Systems   Constitutional:  Negative for appetite change, chills, diaphoresis, fatigue, fever and unexpected weight change.   HENT: Negative.     Eyes: Negative.    Respiratory:  Negative for cough, choking, chest tightness, shortness of breath, wheezing and stridor.    Cardiovascular:  Negative for chest pain, palpitations and leg swelling.   Gastrointestinal:  Negative for abdominal distention, abdominal pain, constipation, diarrhea, nausea and vomiting.   Endocrine: Negative.    Genitourinary: Negative.    Musculoskeletal: Negative.    Skin: Negative.    Allergic/Immunologic: Negative.    Neurological: Negative.    Hematological: Negative.    Psychiatric/Behavioral: Negative.     All other systems reviewed and are negative.      Objective   Physical Exam  Constitutional:       Appearance: Normal appearance.   HENT:      Head: Normocephalic and atraumatic.      Nose: Nose normal.      Mouth/Throat:      Mouth: Mucous membranes are moist.      Pharynx: Oropharynx is clear.   Eyes:      Extraocular Movements: " Extraocular movements intact.      Conjunctiva/sclera: Conjunctivae normal.      Pupils: Pupils are equal, round, and reactive to light.   Cardiovascular:      Rate and Rhythm: Normal rate and regular rhythm.      Pulses: Normal pulses.      Heart sounds: Normal heart sounds.   Pulmonary:      Effort: Pulmonary effort is normal. No respiratory distress.      Breath sounds: Normal breath sounds. No stridor. No wheezing, rhonchi or rales.   Chest:      Chest wall: No tenderness.   Abdominal:      General: Abdomen is flat. Bowel sounds are normal.      Palpations: Abdomen is soft.   Musculoskeletal:         General: Normal range of motion.      Cervical back: Normal range of motion and neck supple.   Skin:     General: Skin is warm and dry.      Capillary Refill: Capillary refill takes less than 2 seconds.   Neurological:      General: No focal deficit present.      Mental Status: He is alert and oriented to person, place, and time.         Assessment/Plan   Diagnoses and all orders for this visit:  Malignant neoplasm of lower lobe of left lung (Multi)  -     Case Request Operating Room: LOBECTOMY, LUNG, ROBOT-ASSISTED        - Obtain MRI of the brain and PFTs         Edmundo Lambert MD 05/19/25 10:11 AM

## 2025-05-27 ENCOUNTER — HOSPITAL ENCOUNTER (OUTPATIENT)
Dept: RADIOLOGY | Facility: HOSPITAL | Age: 65
Discharge: HOME | End: 2025-05-27
Payer: COMMERCIAL

## 2025-05-27 ENCOUNTER — HOSPITAL ENCOUNTER (OUTPATIENT)
Dept: RESPIRATORY THERAPY | Facility: HOSPITAL | Age: 65
Discharge: HOME | End: 2025-05-27
Payer: COMMERCIAL

## 2025-05-27 DIAGNOSIS — C34.90 MALIGNANT NEOPLASM OF LUNG, UNSPECIFIED LATERALITY, UNSPECIFIED PART OF LUNG (MULTI): ICD-10-CM

## 2025-05-27 DIAGNOSIS — R91.1 LUNG NODULE: ICD-10-CM

## 2025-05-27 PROCEDURE — 94664 DEMO&/EVAL PT USE INHALER: CPT

## 2025-05-27 PROCEDURE — 2500000002 HC RX 250 W HCPCS SELF ADMINISTERED DRUGS (ALT 637 FOR MEDICARE OP, ALT 636 FOR OP/ED): Performed by: THORACIC SURGERY (CARDIOTHORACIC VASCULAR SURGERY)

## 2025-05-27 PROCEDURE — 94726 PLETHYSMOGRAPHY LUNG VOLUMES: CPT

## 2025-05-27 PROCEDURE — 70553 MRI BRAIN STEM W/O & W/DYE: CPT | Performed by: RADIOLOGY

## 2025-05-27 PROCEDURE — 70553 MRI BRAIN STEM W/O & W/DYE: CPT

## 2025-05-27 PROCEDURE — 94640 AIRWAY INHALATION TREATMENT: CPT

## 2025-05-27 PROCEDURE — 94060 EVALUATION OF WHEEZING: CPT

## 2025-05-27 PROCEDURE — 2550000001 HC RX 255 CONTRASTS: Mod: JW | Performed by: THORACIC SURGERY (CARDIOTHORACIC VASCULAR SURGERY)

## 2025-05-27 PROCEDURE — 94729 DIFFUSING CAPACITY: CPT

## 2025-05-27 PROCEDURE — A9575 INJ GADOTERATE MEGLUMI 0.1ML: HCPCS | Mod: JW | Performed by: THORACIC SURGERY (CARDIOTHORACIC VASCULAR SURGERY)

## 2025-05-27 RX ORDER — ALBUTEROL SULFATE 0.83 MG/ML
3 SOLUTION RESPIRATORY (INHALATION) ONCE
Status: COMPLETED | OUTPATIENT
Start: 2025-05-27 | End: 2025-05-27

## 2025-05-27 RX ORDER — GADOTERATE MEGLUMINE 376.9 MG/ML
14 INJECTION INTRAVENOUS
Status: COMPLETED | OUTPATIENT
Start: 2025-05-27 | End: 2025-05-27

## 2025-05-27 RX ORDER — ALBUTEROL SULFATE 90 UG/1
1 INHALANT RESPIRATORY (INHALATION) ONCE
Status: COMPLETED | OUTPATIENT
Start: 2025-05-27 | End: 2025-05-27

## 2025-05-27 RX ADMIN — ALBUTEROL SULFATE 3 ML: 2.5 SOLUTION RESPIRATORY (INHALATION) at 15:14

## 2025-05-27 RX ADMIN — GADOTERATE MEGLUMINE 14 ML: 376.9 INJECTION INTRAVENOUS at 13:11

## 2025-05-29 ENCOUNTER — TELEPHONE (OUTPATIENT)
Dept: VASCULAR SURGERY | Facility: HOSPITAL | Age: 65
End: 2025-05-29

## 2025-05-29 ENCOUNTER — CLINICAL SUPPORT (OUTPATIENT)
Dept: PREADMISSION TESTING | Facility: HOSPITAL | Age: 65
End: 2025-05-29
Payer: COMMERCIAL

## 2025-05-29 LAB
MGC ASCENT PFT - FEV1 - POST: 2.56
MGC ASCENT PFT - FEV1 - PRE: 2.44
MGC ASCENT PFT - FEV1 - PREDICTED: 3.1
MGC ASCENT PFT - FVC - POST: 5.05
MGC ASCENT PFT - FVC - PRE: 4.9
MGC ASCENT PFT - FVC - PREDICTED: 4.03

## 2025-05-29 NOTE — CPM/PAT H&P
"[facility]/PAT Evaluation       Name: Iam Acevedo (Iam Acevedo \"Brandon\")  /Age: 1960/65 y.o.     {[facility] Visit Type:87947}      Chief Complaint: ***    HPI  Iam Acevedo is scheduled for LOBECTOMY, LUNG, ROBOT-ASSISTED - Left with Dr. Hector Lambert on 25.  Medical History[1]    Surgical History[2]    Patient Sexual activity questions deferred to the physician.    Family History[3]    Allergies[4]    Prior to Admission medications    Medication Sig Start Date End Date Taking? Authorizing Provider   albuterol 90 mcg/actuation inhaler Inhale 1 puff every 4 hours if needed for wheezing or shortness of breath. 24   JAN Neville   amLODIPine (Norvasc) 10 mg tablet Take 1 tablet (10 mg) by mouth once daily. 24  JAN Neville   apixaban (Eliquis) 5 mg tablet Take 1 tablet (5 mg) by mouth 2 times a day. 5/15/25 8/13/25  Mabel Turner MD   aspirin 81 mg EC tablet Take 1 tablet (81 mg) by mouth once daily.    Historical Provider, MD   atorvastatin (Lipitor) 40 mg tablet Take 1 tablet (40 mg) by mouth once daily. 24  JAN Neville   b complex 0.4 mg tablet Take 1 tablet by mouth once daily.    Historical Provider, MD   ferrous sulfate 325 (65 Fe) MG EC tablet Take 1 tablet (65 mg) by mouth once daily with a meal. Do not crush, chew, or split.    Historical Provider, MD   fluticasone propion-salmeteroL (Advair Diskus) 250-50 mcg/dose diskus inhaler Inhale 1 puff 2 times a day. 24  JAN Neville   lisinopriL-hydrochlorothiazide 20-12.5 mg tablet Take 1 tablet by mouth once daily. 24  JAN Neville   multivitamin with minerals (multivitamin-iron-folic acid) tablet Take 1 tablet by mouth once daily.    Historical Provider, MD   nicotine (Nicoderm CQ) 14 mg/24 hr patch Place 1 patch over 24 hours on the skin once every 24 hours. 2/11/25 5/15/25  KAILEY Neville-CNP        PAT ROS "     PAT Physical Exam     Airway        Visit Vitals  Smoking Status Former       DASI Risk Score      Flowsheet Row Questionnaire Series Submission from 5/20/2025 in [facility] OR with Generic Provider Monisha Questionnaire Series [facility] with Generic Provider Monisha   Can you take care of yourself (eat, dress, bathe, or use toilet)?  2.75  filed at 05/20/2025 0742 2.75  filed at 05/19/2025 1715   Can you walk indoors, such as around your house? 1.75  filed at 05/20/2025 0742 1.75  filed at 05/19/2025 1715   Can you walk a block or two on level ground?  2.75  filed at 05/20/2025 0742 2.75  filed at 05/19/2025 1715   Can you climb a flight of stairs or walk up a hill? 5.5  filed at 05/20/2025 0742 5.5  filed at 05/19/2025 1715   Can you run a short distance? 0  filed at 05/20/2025 0742 0  filed at 05/19/2025 1715   Can you do light work around the house like dusting or washing dishes? 2.7  filed at 05/20/2025 0742 2.7  filed at 05/19/2025 1715   Can you do moderate work around the house like vacuuming, sweeping floors or carrying groceries? 3.5  filed at 05/20/2025 0742 3.5  filed at 05/19/2025 1715   Can you do heavy work around the house like scrubbing floors or lifting and moving heavy furniture?  8  filed at 05/20/2025 0742 8  filed at 05/19/2025 1715   Can you do yard work like raking leaves, weeding or pushing a mower? 4.5  filed at 05/20/2025 0742 4.5  filed at 05/19/2025 1715   Can you have sexual relations? 5.25  filed at 05/20/2025 0742 5.25  filed at 05/19/2025 1715   Can you participate in moderate recreational activities like golf, bowling, dancing, doubles tennis or throwing a baseball or football? 6  filed at 05/20/2025 0742 6  filed at 05/19/2025 1715   Can you participate in strenous sports like swimming, singles tennis, football, basketball, or skiing? 7.5  filed at 05/20/2025 0742 7.5  filed at 05/19/2025 1715    DASI SCORE 50.2  filed at 05/20/2025 0742 50.2  filed at 05/19/2025 1715   METS Score (Will be calculated only when all the questions are answered) 8.9  filed at 05/20/2025 0742 8.9  filed at 05/19/2025 1715          Caprini DVT Assessment      Flowsheet Row Admission (Discharged) from 3/28/2025 in [facility] 7 with Mabel Turner MD Pre-Admission Testing from 3/17/2025 in [facility]   DVT Score (IF A SCORE IS NOT CALCULATING, MUST SELECT A BMI TO COMPLETE) 6 filed at 03/28/2025 1254 8 filed at 03/17/2025 1114   Surgical Factors Major surgery planned, lasting 2-3 hours filed at 03/28/2025 1254 Major surgery planned, lasting over 3 hours filed at 03/17/2025 1114   BMI (BMI MUST BE CHOSEN) 30 or less filed at 03/28/2025 1254 30 or less filed at 03/17/2025 1114          Modified Frailty Index    No data to display       [id number] Stroke Risk Points  Current as of just now        N/A 0 to 9 Points:      Last Change: N/A          The HPI4VR6-HYUp risk score (Lip GH, et al. 2009. © 2010 American College [employer] Physicians) quantifies the risk of stroke for a patient with atrial fibrillation. For patients without atrial fibrillation or under the age of 18 this score appears as N/A. Higher score values generally indicate higher risk of stroke.        This score is not applicable to this patient. Components are not calculated.          Revised Cardiac Risk Index      Flowsheet Row Pre-Admission Testing from 3/17/2025 in [facility]   High-Risk Surgery (Intraperitoneal, Intrathoracic,Suprainguinal vascular) 0 filed at 03/17/2025 1112   History of ischemic heart disease (History of MI, History of positive exercuse test, Current chest paint considered due to myocardial ischemia, Use of nitrate therapy, ECG with pathological Q Waves) 1 filed at 03/17/2025 1112   History of congestive heart failure (pulmonary edemia, bilateral rales or S3 gallop, Paroxysmal  nocturnal dyspnea, CXR showing pulmonary vascular redistribution) 0 filed at 03/17/2025 1112   History of cerebrovascular disease (Prior TIA or stroke) 0 filed at 03/17/2025 1112   Pre-operative insulin treatment 0 filed at 03/17/2025 1112   Pre-operative creatinine>2 mg/dl 0 filed at 03/17/2025 1112   Revised Cardiac Risk Calculator 1 filed at 03/17/2025 1112          Apfel Simplified Score    No data to display       Risk Analysis Index Results This Encounter    No data found in the last 10 encounters.       Stop Bang Score      Flowsheet Row Questionnaire Series Submission from 5/20/2025 in [facility] OR with Generic Provider [place] Questionnaire Series Submission from 5/19/2025 in [facility] OR with Generic Provider [employer]   Do you snore loudly? 0  filed at 05/20/2025 0742 0  filed at 05/19/2025 1715   Do you often feel tired or fatigued after your sleep? 0  filed at 05/20/2025 0742 0  filed at 05/19/2025 1715   Has anyone ever observed you stop breathing in your sleep? 0  filed at 05/20/2025 0742 0  filed at 05/19/2025 1715   Do you have or are you being treated for high blood pressure? 1  filed at 05/20/2025 0742 1  filed at 05/19/2025 1715   Recent BMI (Calculated) 23.7  filed at 05/20/2025 0742 23.7  filed at 05/19/2025 1715   Is BMI greater than 35 kg/m2? 0=No  filed at 05/20/2025 0742 0=No  filed at 05/19/2025 1715   Age older than 50 years old? 1=Yes  filed at 05/20/2025 0742 1=Yes  filed at 05/19/2025 1715   Gender - Male 1=Yes  filed at 05/20/2025 0742 1=Yes  filed at 05/19/2025 1715          Prodigy: High Risk  Total Score: 16              Prodigy Age Score      Prodigy Gender Score          ARISCAT Score for Postoperative Pulmonary Complications      Flowsheet Row Pre-Admission Testing from 3/17/2025 in [facility]   Age Calculated Score 3 filed at 03/17/2025 1113   Preoperative SpO2 0 filed at 03/17/2025 1113   Respiratory infection in  the last month Either upper or lower (i.e., URI, bronchitis, pneumonia), with fever and antibiotic treatment 0 filed at 03/17/2025 1113   Preoperative anemia (Hgb less than 10 g/dl) 0 filed at 03/17/2025 1113   Surgical incision  0 filed at 03/17/2025 1113   Duration of surgery  16 filed at 03/17/2025 1113   Emergency Procedure  0 filed at 03/17/2025 1113   ARISCAT Total Score  19 filed at 03/17/2025 1113          [place] Perioperative Risk for Myocardial Infarction or Cardiac Arrest (LEE)      Flowsheet Row Pre-Admission Testing from 3/17/2025 in [facility]   Calculated Age Score 1.28 filed at 03/17/2025 1112   Functional Status  0 filed at 03/17/2025 1112   ASA Class  -1.92 filed at 03/17/2025 1112   Creatinine 0 filed at 03/17/2025 1112          Testing/Diagnostic:   MR BRAIN W AND WO IV CONTRAST; 5/27/2025   IMPRESSION:  No acute intracranial abnormality; no evidence of metastatic disease.    VASC US ANKLE BRACHIAL INDEX (RONNA) WITHOUT EXERCISE; 5/8/25  CONCLUSIONS:  Right Lower PVR: Evidence of moderate arterial occlusive disease in the right lower extremity at rest. Decreased digital perfusion noted. Monophasic flow is noted in the right posterior tibial artery and right dorsalis pedis artery. The digit PPG is flatlined (TBI essentially zero).     Left Lower PVR: Evidence of moderate arterial occlusive disease in the left lower extremity at rest. Decreased digital perfusion noted. Monophasic flow is noted in the left posterior tibial artery and left dorsalis pedis artery. Severity of disease called by PVR tracings and Doppler waveforms due to partially non-compressible arteries.     CT GUIDED PERCUTANEOUS BIOPSY LUNG; 5/1/2025   IMPRESSION:  CT-guided biopsy of of left lower lobe lung nodule.  Development of post biopsy pneumothorax requiring chest tube with  subsequent removal of the chest tube proximally 4 hours later.    NM PET CT LUNG SPN; 4/7/2025   IMPRESSION:  1. FDG avid left lower lobe  "nodule, concerning for neoplastic process.  2. No FDG avid tracie or distant metastatic disease.    Transthoracic Echo; 3/18/25      Nuclear Stress Test; 3/13/25    ECG; 3/10/25   NSR, Septal Infarct, age undetermined       Cardiac Cath; 11/20/2017  CONCLUSIONS:   1. Non-obstructive coronary artery disease and no evidence of significant coronary artery disease.    CT CARDIAC SCORING; 9/26/2017   IMPRESSION:  1. Total coronary artery calcium score of 2644.4 falls within the  \"Significant plaque burden\" category.  2.  There is mild dilatation of the visualized lower ascending  thoracic aorta measuring 4 cm in size on axial image 13/64.  3. There is a 6 mm subpleural right lower lobe nodule seen on axial  image 42/64 which is decreased in size from the prior study of  03/16/2015 at which time it measured 10 mm. Additional smaller left  lower lobe subpleural nodular densities are present. These are all  smaller than seen previously on 03/16/2015 and likely reflect  resolving infectious or inflammatory nodules.    Patient Specialist/PCP:   Thoracic Surgery: Edmundo Lambert MD 5/19/25 history of peripheral vascular disease who was found to have a left lower lobe nodule. This nodule measures 3 x 2.6 cm. PET scan showing SUV max of 8.9 with negative lymph nodes. He underwent a biopsy that revealed an adenocarcinoma.     Vascular Surgery: Mabel Turner MD 5/15/25 history of LLE CLTI 3 s/p left iliofemoral endarterectomy on 3/28/25. Now with RLE CLTI 3   - Left leg is improved post intervention  - Right leg RONNA has dropped significantly since last visit. He is having short distance claudication. Concern for malignancy-induced hypercoagulability and will start on Eliquis  - OK to hold Eliquis for 48 hours prior to any other surgeries or procedures necessary  - Will followup with Dr. Young regarding lung plan and coordination of vascular surgery procedure timing  - Patient to call after visit with thoracic surgery  - Will " determine followup and plan afterwards    Oncology: Genesis Rivera MD 4/8/25 referred because of PET avid left upper lobe lesion measuring at least 2.3 x 3.4 cm spiculated patient giving strong history of smoking 3 packs/day currently quitting and referred because of a clinical suspicion of non-small cell/small cell lung cancer. No biopsy currently available. PET scan shows localized disease and given the size I am referring patient for CT surgical evaluation and possibly and possible intervention..     PCP: Ángel Mccarty, KAILEY-CNP 11/11/24 presents for Annual Exam.     Cardiology: Schuyler Giang MD at [facility] P: [phone] F: [phone] Faxed Risk Stratification to office on 5/29/25    Assessment and Plan:    ONLY    Mercedez Morales RN  Pre-Admission Testing   {[facility] ASSESSMENT AND PLAN:15005}             [1]   Past Medical History:  Diagnosis Date    Atherosclerosis of native arteries of extremities with intermittent claudication, left leg 03/21/2022    Atherosclerosis of native arteries of extremities with intermittent claudication, left leg    COPD (chronic obstructive pulmonary disease) (Multi)     Coronary artery disease     Hypertension     Malignant neoplasm of lower lobe of left lung (Multi)     Other acute postprocedural pain 02/08/2021    Acute post-operative pain    PAD (peripheral artery disease)     s/p L femoropopliteal atherectomy and iliac stent (Dr. Cyr)    Personal history of nicotine dependence 03/22/2022    History of tobacco abuse    Personal history of other diseases of the digestive system 03/11/2021    History of right inguinal hernia    Pneumonia, unspecified organism     Atypical pneumonia    Solitary pulmonary nodule 04/30/2015    Lung nodule    Unspecified abdominal hernia without obstruction or gangrene     Hernia   [2]   Past Surgical History:  Procedure Laterality Date    CARDIAC CATHETERIZATION  11/20/2017    CT LUNG  MEDIASTINUM BIOPSY  05/01/2025    HERNIA REPAIR      MULTIPLE TOOTH EXTRACTIONS Bilateral 10/21/2024    VASCULAR SURGERY  03/28/2025    Left iliofemoral endarterectomy with bovine patch angioplasty Selective angiography of the left lower extremity   [3] No family history on file.  [4] No Known Allergies

## 2025-06-01 NOTE — CPM/PAT H&P
"CPM/PAT Evaluation       Name: Iam Acevedo (Iam Acevedo \"Brandon\")  /Age: 1960/65 y.o.     Visit Type:   In-Person       Chief Complaint: Perioperative visit     HPI 64 y/o male scheduled for left lung lobestomy robot-assisted on 25 secondary to Malignant neoplasm of lower lobe of left lung (Multi) with . Dr. Hector Lambert who referred to [facility].  Presents to [facility] today for perioperative risk stratification and optimization. PMHX includes CAD, HTN, HLD, PAD and COPD.      Medical History[1]    Surgical History[2]    Patient Sexual activity questions deferred to the physician.    Family History[3]    Allergies[4]    Prior to Admission medications    Medication Sig Start Date End Date Taking? Authorizing Provider   albuterol 90 mcg/actuation inhaler Inhale 1 puff every 4 hours if needed for wheezing or shortness of breath. 24   JAN Neville   amLODIPine (Norvasc) 10 mg tablet Take 1 tablet (10 mg) by mouth once daily. 24  JAN Neville   apixaban (Eliquis) 5 mg tablet Take 1 tablet (5 mg) by mouth 2 times a day. 5/15/25 8/13/25  Mabel Turner MD   aspirin 81 mg EC tablet Take 1 tablet (81 mg) by mouth once daily.    Historical Provider, MD   atorvastatin (Lipitor) 40 mg tablet Take 1 tablet (40 mg) by mouth once daily. 24  JAN Neville   b complex 0.4 mg tablet Take 1 tablet by mouth once daily.    Historical Provider, MD   ferrous sulfate 325 (65 Fe) MG EC tablet Take 1 tablet (65 mg) by mouth once daily with a meal. Do not crush, chew, or split.    Historical Provider, MD   fluticasone propion-salmeteroL (Advair Diskus) 250-50 mcg/dose diskus inhaler Inhale 1 puff 2 times a day. 24  JAN Neville   lisinopriL-hydrochlorothiazide 20-12.5 mg tablet Take 1 tablet by mouth once daily. 24  Ángel Mccarty, KAILYE-CNP   multivitamin with minerals (multivitamin-iron-folic acid) tablet Take 1 " "tablet by mouth once daily.    Historical Provider, MD   nicotine (Nicoderm CQ) 14 mg/24 hr patch Place 1 patch over 24 hours on the skin once every 24 hours. 2/11/25 5/15/25  KAILEY Neville-CNP        PAT ROS:   Constitutional:   neg    Neuro/Psych:    numbness (right foot)  Eyes:   neg    Ears:   neg    Nose:   neg    Mouth:   neg    Throat:   neg    Neck:   neg    Cardio:    WATKINS  Respiratory:    cough  Endocrine:   GI:   neg    :   neg    Musculoskeletal:   neg    Hematologic:   neg    Skin:  neg        Physical Exam  Vitals reviewed.   Constitutional:       Appearance: Normal appearance. He is normal weight.   HENT:      Head: Normocephalic.      Nose: Nose normal.      Mouth/Throat:      Pharynx: Oropharynx is clear.   Eyes:      Pupils: Pupils are equal, round, and reactive to light.   Cardiovascular:      Rate and Rhythm: Normal rate and regular rhythm.      Pulses: Normal pulses.      Heart sounds: Normal heart sounds.   Pulmonary:      Effort: Pulmonary effort is normal.      Breath sounds: Normal breath sounds.      Comments: Dry cough present   Genitourinary:     Comments: Not examined   Musculoskeletal:         General: Normal range of motion.      Cervical back: Normal range of motion.   Skin:     General: Skin is warm and dry.   Neurological:      General: No focal deficit present.      Mental Status: He is alert and oriented to person, place, and time.   Psychiatric:         Mood and Affect: Mood normal.         Behavior: Behavior normal.         Thought Content: Thought content normal.         Judgment: Judgment normal.        PAT AIRWAY:   Airway:     Mallampati::  II    TM distance::  >3 FB    Neck ROM::  Full   upper dentures and lower dentures      Visit Vitals  /83   Pulse 78   Temp 36.3 °C (97.3 °F)   Ht 1.676 m (5' 6\")   Wt 67.4 kg (148 lb 9.6 oz)   SpO2 98%   BMI 23.98 kg/m²   Smoking Status Former   BSA 1.77 m²       DASI Risk Score      Flowsheet Row Pre-Admission Testing " from 6/2/2025 in [facility] Questionnaire Series Submission from 5/20/2025 in [facility] Caden OR with Generic Provider Monisha   Can you take care of yourself (eat, dress, bathe, or use toilet)?  2.75 filed at 06/02/2025 0746 2.75  filed at 05/20/2025 0742   Can you walk indoors, such as around your house? 1.75 filed at 06/02/2025 0746 1.75  filed at 05/20/2025 0742   Can you walk a block or two on level ground?  2.75 filed at 06/02/2025 0746 2.75  filed at 05/20/2025 0742   Can you climb a flight of stairs or walk up a hill? 5.5 filed at 06/02/2025 0746 5.5  filed at 05/20/2025 0742   Can you run a short distance? 0 filed at 06/02/2025 0746 0  filed at 05/20/2025 0742   Can you do light work around the house like dusting or washing dishes? 2.7 filed at 06/02/2025 0746 2.7  filed at 05/20/2025 0742   Can you do moderate work around the house like vacuuming, sweeping floors or carrying groceries? 3.5 filed at 06/02/2025 0746 3.5  filed at 05/20/2025 0742   Can you do heavy work around the house like scrubbing floors or lifting and moving heavy furniture?  0 filed at 06/02/2025 0746 8  filed at 05/20/2025 0742   Can you do yard work like raking leaves, weeding or pushing a mower? 4.5 filed at 06/02/2025 0746 4.5  filed at 05/20/2025 0742   Can you have sexual relations? 5.25 filed at 06/02/2025 0746 5.25  filed at 05/20/2025 0742   Can you participate in moderate recreational activities like golf, bowling, dancing, doubles tennis or throwing a baseball or football? 6 filed at 06/02/2025 0746 6  filed at 05/20/2025 0742   Can you participate in strenous sports like swimming, singles tennis, football, basketball, or skiing? 7.5 filed at 06/02/2025 0746 7.5  filed at 05/20/2025 0742   DASI SCORE 42.2 filed at 06/02/2025 0746 50.2  filed at 05/20/2025 0742   METS Score (Will be calculated only when all the questions are answered) 7.9 filed at 06/02/2025 0746 8.9  filed at 05/20/2025  0742          Caprini DVT Assessment      Flowsheet Row Pre-Admission Testing from 6/2/2025 in [facility] Admission (Discharged) from 3/28/2025 in [facility] Ariane Coffey 7 with Mabel Turner MD   DVT Score (IF A SCORE IS NOT CALCULATING, MUST SELECT A BMI TO COMPLETE) 9 filed at 06/02/2025 0744 6 filed at 03/28/2025 1254   Medical Factors Present cancer, chemotherapy, or previous malignancy, COPD filed at 06/02/2025 0744 --   Surgical Factors Major surgery planned, lasting 2-3 hours filed at 06/02/2025 0744 Major surgery planned, lasting 2-3 hours filed at 03/28/2025 1254   BMI (BMI MUST BE CHOSEN) 30 or less filed at 06/02/2025 0744 30 or less filed at 03/28/2025 1254          Modified Frailty Index    No data to display       [id number] Stroke Risk Points  Current as of just now        N/A 0 to 9 Points:      Last Change: N/A          The [id number] risk score (Lip EBONY, et al. 2009. © 2010 [employer]) quantifies the risk of stroke for a patient with atrial fibrillation. For patients without atrial fibrillation or under the age of 18 this score appears as N/A. Higher score values generally indicate higher risk of stroke.        This score is not applicable to this patient. Components are not calculated.          Revised Cardiac Risk Index      Flowsheet Row Pre-Admission Testing from 6/2/2025 in [facility] Pre-Admission Testing from 3/17/2025 in [facility]   High-Risk Surgery (Intraperitoneal, Intrathoracic,Suprainguinal vascular) 1 filed at 06/02/2025 0726 0 filed at 03/17/2025 1112   History of ischemic heart disease (History of MI, History of positive exercuse test, Current chest paint considered due to myocardial ischemia, Use of nitrate therapy, ECG with pathological Q Waves) 1 filed at 06/02/2025 0726 1 filed at 03/17/2025 1112   History of congestive heart failure (pulmonary edemia, bilateral rales or S3 gallop,  Paroxysmal nocturnal dyspnea, CXR showing pulmonary vascular redistribution) 0 filed at 06/02/2025 0726 0 filed at 03/17/2025 1112   History of cerebrovascular disease (Prior TIA or stroke) 0 filed at 06/02/2025 0726 0 filed at 03/17/2025 1112   Pre-operative insulin treatment 0 filed at 06/02/2025 0726 0 filed at 03/17/2025 1112   Pre-operative creatinine>2 mg/dl 0 filed at 06/02/2025 0726 0 filed at 03/17/2025 1112   Revised Cardiac Risk Calculator 2 filed at 06/02/2025 0726 1 filed at 03/17/2025 1112          Apfel Simplified Score      Flowsheet Row Pre-Admission Testing from 6/2/2025 in [facility]   Smoking status 1 filed at 06/02/2025 0820   History of motion sickness or PONV  0 filed at 06/02/2025 0820   Use of postoperative opioids 1 filed at 06/02/2025 0820   Gender - Female 0=No filed at 06/02/2025 0820   Apfel Simplified Score Calculator 2 filed at 06/02/2025 0820          Risk Analysis Index Results This Encounter    No data found in the last 10 encounters.       Stop Bang Score      Flowsheet Row Pre-Admission Testing from 6/2/2025 in [facility] Questionnaire Series Submission from 5/20/2025 in [facility] Caden OR with Generic Provider Mauriciot   Do you snore loudly? 0 filed at 06/02/2025 0747 0  filed at 05/20/2025 0742   Do you often feel tired or fatigued after your sleep? 0 filed at 06/02/2025 0747 0  filed at 05/20/2025 0742   Has anyone ever observed you stop breathing in your sleep? 0 filed at 06/02/2025 0747 0  filed at 05/20/2025 0742   Do you have or are you being treated for high blood pressure? 1 filed at 06/02/2025 0747 1  filed at 05/20/2025 0742   Recent BMI (Calculated) 23.7 filed at 06/02/2025 0747 23.7  filed at 05/20/2025 0742   Is BMI greater than 35 kg/m2? 0=No filed at 06/02/2025 0747 0=No  filed at 05/20/2025 0742   Age older than 50 years old? 1=Yes filed at 06/02/2025 0747 1=Yes  filed at 05/20/2025 0742   Is your neck  circumference greater than 17 inches (Male) or 16 inches (Female)? 0 filed at 06/02/2025 0747 --   Gender - Male 1=Yes filed at 06/02/2025 0747 1=Yes  filed at 05/20/2025 0742   STOP-BANG Total Score 3 filed at 06/02/2025 0747 --          Prodigy: High Risk  Total Score: 19              Prodigy Age Score      Prodigy Gender Score     Prodigy Previous Opioid Use Score           ARISCAT Score for Postoperative Pulmonary Complications      Flowsheet Row Pre-Admission Testing from 6/2/2025 in [facility] Pre-Admission Testing from 3/17/2025 in [facility]   Age Calculated Score 3 filed at 06/02/2025 0821 3 filed at 03/17/2025 1113   Preoperative SpO2 0 filed at 06/02/2025 0821 0 filed at 03/17/2025 1113   Respiratory infection in the last month Either upper or lower (i.e., URI, bronchitis, pneumonia), with fever and antibiotic treatment 0 filed at 06/02/2025 0821 0 filed at 03/17/2025 1113   Preoperative anemia (Hgb less than 10 g/dl) 0 filed at 06/02/2025 0821 0 filed at 03/17/2025 1113   Surgical incision  24 filed at 06/02/2025 0821 0 filed at 03/17/2025 1113   Duration of surgery  16 filed at 06/02/2025 0821 16 filed at 03/17/2025 1113   Emergency Procedure  0 filed at 06/02/2025 0821 0 filed at 03/17/2025 1113   ARISCAT Total Score  43 filed at 06/02/2025 0821 19 filed at 03/17/2025 1113          [place] Perioperative Risk for Myocardial Infarction or Cardiac Arrest (LEE)      Flowsheet Row Pre-Admission Testing from 3/17/2025 in [facility]   Calculated Age Score 1.28 filed at 03/17/2025 1112   Functional Status  0 filed at 03/17/2025 1112   ASA Class  -1.92 filed at 03/17/2025 1112   Creatinine 0 filed at 03/17/2025 1112            Patient Specialist/PCP:   Thoracic Surgery: Edmundo Lambert MD 5/19/25   Vascular Surgery: Mabel Turner MD 5/15/25   Oncology: Genesis Rivera MD 4/8/25  PCP: KAILEY Neville-CNP 11/11/24   Cardiology: Schuyler Giang,  MD  - [facility]   P: [phone] F: [phone]   - Faxed Risk Stratification to office on 5/29/25    Assessment and Plan:     Anesthesia  The patient denies problems with anesthesia in the past such as PONV, prolonged sedation, awareness, dental damage, aspiration, cardiac arrest, difficult intubation, or unexpected hospital admissions.     Airway  No documented or reported history of airway difficulty.     Neurology  The patient has no neurological diagnoses or significant findings on chart review, clinical presentation, and evaluation. No grossly apparent neurological perioperative risk. The patient is at increased risk for postoperative delirium secondary to age 65 or older. The patient is at increased risk for perioperative stroke secondary to cardiac disease, hypertension , increased age, general anesthesia. Handouts for preoperative brain exercises given to patient.    HEENT  No diagnoses or significant findings on chart review or clinical presentation and evaluation.    Cardiovascular  #CAD on ASA (continue)    #HTN  - Managed on amlodipine (continue) and lisinopril-hydrochlorothiazide (hold 24 hr prior to surgery)     #HLD  - Managed on atorvastatin (continue)    Cardiology Evaluation  The patient follows with cardiology, Dr. Schuyler Giang. Patient was last seen 3/10/25. Risk stratification has been requested.    Vascular:  #RLE CLTI 3  - Concern for malignancy-induced hypercoagulability and will start on Eliquis per Dr. Johnny HOWELL to hold Eliquis for 48 hours prior to any other surgeries or procedures necessary   -Patient is planning for lower extremity angiogram on 6/3/25    #LLE CLTI 3 s/p left iliofemoral endarterectomy on 3/28/25, left leg has greatly improved since revascularization     METS  The patient's functional capacity is greater than 4 METS.  RCRI  The patient meets 2 RCRI criteria and therefore has a 10.1% risk (elevated) of major adverse cardiac  complications.  LEE score which indicates a 0.4% risk of intraoperative or 30-day postoperative MACE (major adverse cardiac event).    He is scheduled for non-cardiac surgery associated with elevated risk. The patient has no major cardiac contraindications to non- cardiac surgery.    Pulmonary  #Left lower lung adenocarcinoma  -Found to have a left lower lobe nodule. This nodule measures 3 x 2.6 cm. PET scan showing SUV max of 8.9 with negative lymph nodes. He underwent a biopsy that revealed an adenocarcinoma.   - Patient has seen Dr. Lambert on 5/19/25 who is planning a robotic assisted left lower lobe lobectomy with lymphadenectomy on 6/23/25    #COPD on bronchodilators (continue)   - Recent PFT 5/27/25: spirometry shows mild obstruction with small airways bronchodilator response, lung volumes show hyperinflation and air trapping, diffusing capacity is normal     #Former tobacco user quit 02/2025    The patient is at increased risk of perioperative pulmonary complications secondary to thoracic surgery, ongoing tobacco abuse, advanced age greater than 60. ERAS patient with incentive spirometry given preop. Patient instruction sheet for incentive spirometry given.      STOP BANG 3, which places patient at intermediate risk for having ARJUN.  ARISCAT 43, Intermediate, 13.3% risk of in-hospital postoperative pulmonary complications  PRODIGY 16, high risk of respiratory depression episode.     Patient given PI sheet for preoperative deep breathing exercises.  Encourage  incentive spirometry in the postoperative period as deemed necessary.    Endocrine  No diagnoses or significant findings on chart review or clinical presentation and evaluation.    Gastrointestinal  No diagnoses or significant findings on chart review or clinical presentation and evaluation.    Eat 10- 0,  self-perceived oropharyngeal dysphagia scale (0-40)     Genitourinary  No diagnoses or significant findings on chart review or clinical presentation  and evaluation.    Renal  No renal diagnoses or significant findings on chart review or clinical presentation and evaluation. The patient has specific risk factors associated with increased risk of perioperative renal complications related to age greater than 55, male gender, hypertension, COPD. Preventative measures include preoperative hydration.    Hematology  - Referred to oncology Dr. Genesis Rivera on 4/8/25 for avid left upper lobe lesion measuring at least 2.3 x 3.4 cm     #Anemia   - Managed on iron supplements, last hgb 13.5 on 5/1/25    Caprini score 9, high risk of perioperative VTE.     Patient instructed to ambulate as soon as possible postoperatively to decrease thromboembolic risk. Initiate mechanical DVT prophylaxis as soon as possible and initiate chemical prophylaxis when deemed safe from a bleeding standpoint post surgery.     Transfusion Evaluation  A type and screen was obtained given the likelihood for perioperative transfusion of blood or blood products.    Musculoskeletal  No diagnoses or significant findings on chart review or clinical presentation and evaluation.    ID  MRSA screening obtained. Prescriptions and instructions given for Hibiclens and Peridex.    Diagnostic Results      MR BRAIN W AND WO IV CONTRAST; 5/27/2025   No acute intracranial abnormality; no evidence of metastatic disease.    VASC US ANKLE BRACHIAL INDEX (RONNA) WITHOUT EXERCISE; 5/8/25  Right Lower PVR: Evidence of moderate arterial occlusive disease in the right lower extremity at rest. Decreased digital perfusion noted. Monophasic flow is noted in the right posterior tibial artery and right dorsalis pedis artery. The digit PPG is flatlined (TBI essentially zero).     Left Lower PVR: Evidence of moderate arterial occlusive disease in the left lower extremity at rest. Decreased digital perfusion noted. Monophasic flow is noted in the left posterior tibial artery and left dorsalis pedis artery. Severity of disease  "called by PVR tracings and Doppler waveforms due to partially non-compressible arteries.     CT GUIDED PERCUTANEOUS BIOPSY LUNG; 5/1/2025   CT-guided biopsy of of left lower lobe lung nodule.  Development of post biopsy pneumothorax requiring chest tube with  subsequent removal of the chest tube proximally 4 hours later.    NM PET CT LUNG SPN; 4/7/2025   1. FDG avid left lower lobe nodule, concerning for neoplastic process.  2. No FDG avid tracie or distant metastatic disease.    Transthoracic Echo; 3/18/25      Nuclear Stress Test; 3/13/25    ECG; 3/10/25   NSR, Septal Infarct, age undetermined       Carotid artery ultrasound 3/21/25:  Left internal carotid stenosis 20-49%  Right internal carotid stenosis 20-49%    Cardiac Cath; 11/20/2017   1. Non-obstructive coronary artery disease and no evidence of significant coronary artery disease.    CT CARDIAC SCORING; 9/26/2017   1. Total coronary artery calcium score of 2644.4 falls within the \"Significant plaque burden\" category.  2.  There is mild dilatation of the visualized lower ascending thoracic aorta measuring 4 cm in size on axial image 13/64.  3. There is a 6 mm subpleural right lower lobe nodule seen on axial image 42/64 which is decreased in size from the prior study of 03/16/2015 at which time it measured 10 mm. Additional smaller left lower lobe subpleural nodular densities are present. These are all  smaller than seen previously on 03/16/2015 and likely reflect resolving infectious or inflammatory nodules.    Recent Results (from the past 2 weeks)   Complete Pulmonary Function Test (Spirometry/DLCO/Lung Volumes)    Collection Time: 05/27/25  2:52 PM   Result Value Ref Range    FVC - Predicted 4.03     FEV1 - Predicted 3.10     FVC - PRE 4.90     FEV1 - Pre 2.44     FVC - Post 5.05     FEV1 - Post 2.56    Type And Screen Is this order related to pregnancy or an upcoming surgery? Yes; Where will this surgery/delivery be performed? [facility]; " What is the date of the surgery? 6/6/2025; Has this patient ever had a transfusion? Unknown; H...    Collection Time: 06/02/25  8:16 AM   Result Value Ref Range    ABO TYPE A     Rh TYPE POS     ANTIBODY SCREEN NEG    CBC and Auto Differential    Collection Time: 06/02/25  8:16 AM   Result Value Ref Range    WBC 5.8 4.4 - 11.3 x10*3/uL    nRBC 0.0 0.0 - 0.0 /100 WBCs    RBC 4.25 (L) 4.50 - 5.90 x10*6/uL    Hemoglobin 13.9 13.5 - 17.5 g/dL    Hematocrit 42.3 41.0 - 52.0 %     80 - 100 fL    MCH 32.7 26.0 - 34.0 pg    MCHC 32.9 32.0 - 36.0 g/dL    RDW 12.9 11.5 - 14.5 %    Platelets 259 150 - 450 x10*3/uL    Neutrophils % 63.8 40.0 - 80.0 %    Immature Granulocytes %, Automated 0.5 0.0 - 0.9 %    Lymphocytes % 22.6 13.0 - 44.0 %    Monocytes % 11.7 2.0 - 10.0 %    Eosinophils % 0.9 0.0 - 6.0 %    Basophils % 0.5 0.0 - 2.0 %    Neutrophils Absolute 3.69 1.20 - 7.70 x10*3/uL    Immature Granulocytes Absolute, Automated 0.03 0.00 - 0.70 x10*3/uL    Lymphocytes Absolute 1.31 1.20 - 4.80 x10*3/uL    Monocytes Absolute 0.68 0.10 - 1.00 x10*3/uL    Eosinophils Absolute 0.05 0.00 - 0.70 x10*3/uL    Basophils Absolute 0.03 0.00 - 0.10 x10*3/uL   Comprehensive Metabolic Panel    Collection Time: 06/02/25  8:16 AM   Result Value Ref Range    Glucose 85 74 - 99 mg/dL    Sodium 134 (L) 136 - 145 mmol/L    Potassium 4.4 3.5 - 5.3 mmol/L    Chloride 100 98 - 107 mmol/L    Bicarbonate 26 21 - 32 mmol/L    Anion Gap 12 10 - 20 mmol/L    Urea Nitrogen 16 6 - 23 mg/dL    Creatinine 0.68 0.50 - 1.30 mg/dL    eGFR >90 >60 mL/min/1.73m*2    Calcium 9.3 8.6 - 10.6 mg/dL    Albumin 4.1 3.4 - 5.0 g/dL    Alkaline Phosphatase 78 33 - 136 U/L    Total Protein 6.5 6.4 - 8.2 g/dL    AST 27 9 - 39 U/L    Bilirubin, Total 0.7 0.0 - 1.2 mg/dL    ALT 26 10 - 52 U/L   Coagulation Screen    Collection Time: 06/02/25  8:16 AM   Result Value Ref Range    Protime 10.9 9.8 - 12.4 seconds    INR 1.0 0.9 - 1.1    aPTT 33 26 - 36 seconds    Staphylococcus aureus/MRSA colonization, Culture    Collection Time: 06/02/25  8:16 AM    Specimen: Nares/Axilla/Groin; Swab   Result Value Ref Range    Staph/MRSA Screen Culture No Staphylococcus aureus isolated    ACTIVATED CLOTTING TIME LOW    Collection Time: 06/03/25 11:22 AM   Result Value Ref Range    POCT Activated Clotting Time Low Range 178 83 - 199 sec   ACTIVATED CLOTTING TIME LOW    Collection Time: 06/03/25 11:41 AM   Result Value Ref Range    POCT Activated Clotting Time Low Range 337 (H) 83 - 199 sec   ACTIVATED CLOTTING TIME LOW    Collection Time: 06/03/25 12:17 PM   Result Value Ref Range    POCT Activated Clotting Time Low Range 331 (H) 83 - 199 sec      Labs collected today: CBC w/ diff, T/s, CMP, Coag and MRSA  -Labs and diagnostic testing reviewed on 6/2/25    -Preoperative medication instructions were provided and reviewed with the patient.  Any additional testing or evaluation was explained to the patient.  NPO Instructions were discussed, and the patient's questions were answered prior to conclusion of this encounter. Patient verbalized understanding of preoperative instructions. After Visit Summary given.               [1]   Past Medical History:  Diagnosis Date    Anemia     Atherosclerosis of native arteries of extremities with intermittent claudication, left leg 03/21/2022    Atherosclerosis of native arteries of extremities with intermittent claudication, left leg    COPD (chronic obstructive pulmonary disease) (Multi)     Coronary artery disease     Hyperlipidemia     Hypertension     Malignant neoplasm of lower lobe of left lung (Multi)     Myocardial infarction (Multi)     2017    Other acute postprocedural pain 02/08/2021    Acute post-operative pain    PAD (peripheral artery disease)     s/p L femoropopliteal atherectomy and iliac stent (Dr. Cyr)    Personal history of other diseases of the digestive system 03/11/2021    History of right inguinal hernia    Solitary pulmonary  nodule 04/30/2015    Lung nodule    Unspecified abdominal hernia without obstruction or gangrene     Hernia   [2]   Past Surgical History:  Procedure Laterality Date    CARDIAC CATHETERIZATION      CT LUNG MEDIASTINUM BIOPSY  05/01/2025    HERNIA REPAIR      MULTIPLE TOOTH EXTRACTIONS Bilateral 10/21/2024    VASCULAR SURGERY  03/28/2025    Left iliofemoral endarterectomy with bovine patch angioplasty Selective angiography of the left lower extremity   [3]   Family History  Problem Relation Name Age of Onset    Hypertension Mother      Hypertension Father      Kidney disease Father      Heart attack Maternal Grandmother     [4] No Known Allergies

## 2025-06-02 ENCOUNTER — PRE-ADMISSION TESTING (OUTPATIENT)
Dept: PREADMISSION TESTING | Facility: HOSPITAL | Age: 65
End: 2025-06-02
Payer: COMMERCIAL

## 2025-06-02 ENCOUNTER — ANESTHESIA EVENT (OUTPATIENT)
Dept: OPERATING ROOM | Facility: HOSPITAL | Age: 65
End: 2025-06-02
Payer: COMMERCIAL

## 2025-06-02 VITALS
WEIGHT: 148.6 LBS | HEIGHT: 66 IN | SYSTOLIC BLOOD PRESSURE: 128 MMHG | BODY MASS INDEX: 23.88 KG/M2 | TEMPERATURE: 97.3 F | DIASTOLIC BLOOD PRESSURE: 83 MMHG | HEART RATE: 78 BPM | OXYGEN SATURATION: 98 %

## 2025-06-02 DIAGNOSIS — Z01.818 PREOPERATIVE TESTING: ICD-10-CM

## 2025-06-02 DIAGNOSIS — C34.32 MALIGNANT NEOPLASM OF LOWER LOBE OF LEFT LUNG (MULTI): Primary | ICD-10-CM

## 2025-06-02 LAB
ABO GROUP (TYPE) IN BLOOD: NORMAL
ALBUMIN SERPL BCP-MCNC: 4.1 G/DL (ref 3.4–5)
ALP SERPL-CCNC: 78 U/L (ref 33–136)
ALT SERPL W P-5'-P-CCNC: 26 U/L (ref 10–52)
ANION GAP SERPL CALC-SCNC: 12 MMOL/L (ref 10–20)
ANTIBODY SCREEN: NORMAL
APTT PPP: 33 SECONDS (ref 26–36)
AST SERPL W P-5'-P-CCNC: 27 U/L (ref 9–39)
BASOPHILS # BLD AUTO: 0.03 X10*3/UL (ref 0–0.1)
BASOPHILS NFR BLD AUTO: 0.5 %
BILIRUB SERPL-MCNC: 0.7 MG/DL (ref 0–1.2)
BUN SERPL-MCNC: 16 MG/DL (ref 6–23)
CALCIUM SERPL-MCNC: 9.3 MG/DL (ref 8.6–10.6)
CHLORIDE SERPL-SCNC: 100 MMOL/L (ref 98–107)
CO2 SERPL-SCNC: 26 MMOL/L (ref 21–32)
CREAT SERPL-MCNC: 0.68 MG/DL (ref 0.5–1.3)
EGFRCR SERPLBLD CKD-EPI 2021: >90 ML/MIN/1.73M*2
EOSINOPHIL # BLD AUTO: 0.05 X10*3/UL (ref 0–0.7)
EOSINOPHIL NFR BLD AUTO: 0.9 %
ERYTHROCYTE [DISTWIDTH] IN BLOOD BY AUTOMATED COUNT: 12.9 % (ref 11.5–14.5)
GLUCOSE SERPL-MCNC: 85 MG/DL (ref 74–99)
HCT VFR BLD AUTO: 42.3 % (ref 41–52)
HGB BLD-MCNC: 13.9 G/DL (ref 13.5–17.5)
IMM GRANULOCYTES # BLD AUTO: 0.03 X10*3/UL (ref 0–0.7)
IMM GRANULOCYTES NFR BLD AUTO: 0.5 % (ref 0–0.9)
INR PPP: 1 (ref 0.9–1.1)
LYMPHOCYTES # BLD AUTO: 1.31 X10*3/UL (ref 1.2–4.8)
LYMPHOCYTES NFR BLD AUTO: 22.6 %
MCH RBC QN AUTO: 32.7 PG (ref 26–34)
MCHC RBC AUTO-ENTMCNC: 32.9 G/DL (ref 32–36)
MCV RBC AUTO: 100 FL (ref 80–100)
MONOCYTES # BLD AUTO: 0.68 X10*3/UL (ref 0.1–1)
MONOCYTES NFR BLD AUTO: 11.7 %
NEUTROPHILS # BLD AUTO: 3.69 X10*3/UL (ref 1.2–7.7)
NEUTROPHILS NFR BLD AUTO: 63.8 %
NRBC BLD-RTO: 0 /100 WBCS (ref 0–0)
PLATELET # BLD AUTO: 259 X10*3/UL (ref 150–450)
POTASSIUM SERPL-SCNC: 4.4 MMOL/L (ref 3.5–5.3)
PROT SERPL-MCNC: 6.5 G/DL (ref 6.4–8.2)
PROTHROMBIN TIME: 10.9 SECONDS (ref 9.8–12.4)
RBC # BLD AUTO: 4.25 X10*6/UL (ref 4.5–5.9)
RH FACTOR (ANTIGEN D): NORMAL
SODIUM SERPL-SCNC: 134 MMOL/L (ref 136–145)
WBC # BLD AUTO: 5.8 X10*3/UL (ref 4.4–11.3)

## 2025-06-02 PROCEDURE — 85025 COMPLETE CBC W/AUTO DIFF WBC: CPT

## 2025-06-02 PROCEDURE — 85610 PROTHROMBIN TIME: CPT

## 2025-06-02 PROCEDURE — 87081 CULTURE SCREEN ONLY: CPT

## 2025-06-02 PROCEDURE — 99204 OFFICE O/P NEW MOD 45 MIN: CPT

## 2025-06-02 PROCEDURE — 86900 BLOOD TYPING SEROLOGIC ABO: CPT | Performed by: SURGERY

## 2025-06-02 PROCEDURE — 36415 COLL VENOUS BLD VENIPUNCTURE: CPT

## 2025-06-02 PROCEDURE — 80053 COMPREHEN METABOLIC PANEL: CPT

## 2025-06-02 RX ORDER — CHLORHEXIDINE GLUCONATE ORAL RINSE 1.2 MG/ML
SOLUTION DENTAL
Qty: 120 ML | Refills: 0 | Status: ON HOLD | OUTPATIENT
Start: 2025-06-02 | End: 2025-06-03 | Stop reason: WASHOUT

## 2025-06-02 RX ORDER — CHLORHEXIDINE GLUCONATE 40 MG/ML
SOLUTION TOPICAL
Qty: 473 ML | Refills: 0 | Status: ON HOLD | OUTPATIENT
Start: 2025-06-02 | End: 2025-06-03 | Stop reason: WASHOUT

## 2025-06-02 ASSESSMENT — DUKE ACTIVITY SCORE INDEX (DASI)
CAN YOU RUN A SHORT DISTANCE: NO
CAN YOU HAVE SEXUAL RELATIONS: YES
CAN YOU CLIMB A FLIGHT OF STAIRS OR WALK UP A HILL: YES
CAN YOU DO LIGHT WORK AROUND THE HOUSE LIKE DUSTING OR WASHING DISHES: YES
TOTAL_SCORE: 42.2
CAN YOU WALK A BLOCK OR TWO ON LEVEL GROUND: YES
CAN YOU DO YARD WORK LIKE RAKING LEAVES, WEEDING OR PUSHING A MOWER: YES
CAN YOU DO HEAVY WORK AROUND THE HOUSE LIKE SCRUBBING FLOORS OR LIFTING AND MOVING HEAVY FURNITURE: NO
CAN YOU PARTICIPATE IN MODERATE RECREATIONAL ACTIVITIES LIKE GOLF, BOWLING, DANCING, DOUBLES TENNIS OR THROWING A BASEBALL OR FOOTBALL: YES
CAN YOU WALK INDOORS, SUCH AS AROUND YOUR HOUSE: YES
CAN YOU DO MODERATE WORK AROUND THE HOUSE LIKE VACUUMING, SWEEPING FLOORS OR CARRYING GROCERIES: YES
CAN YOU PARTICIPATE IN STRENOUS SPORTS LIKE SWIMMING, SINGLES TENNIS, FOOTBALL, BASKETBALL, OR SKIING: YES
DASI METS SCORE: 7.9
CAN YOU TAKE CARE OF YOURSELF (EAT, DRESS, BATHE, OR USE TOILET): YES

## 2025-06-02 ASSESSMENT — ENCOUNTER SYMPTOMS
DYSPNEA WITH EXERTION: 1
COUGH: 1
NUMBNESS: 1
NECK NEGATIVE: 1
MUSCULOSKELETAL NEGATIVE: 1
GASTROINTESTINAL NEGATIVE: 1
CONSTITUTIONAL NEGATIVE: 1
EYES NEGATIVE: 1

## 2025-06-02 ASSESSMENT — LIFESTYLE VARIABLES: SMOKING_STATUS: NONSMOKER

## 2025-06-02 NOTE — NURSING NOTE
Patient seen in PAT. Orders reviewed with PAT Provider.     Following labs obtained by documenting RN: CBC with diff, T/S, MRSA, Coags, CMP    EKG: Previoulsy done 3/25    Patient discharged with: Pre-procedure instructions/AVS, no further questions        Sofya FERGUSONN, RN  [facility]   [facility]

## 2025-06-02 NOTE — PREPROCEDURE INSTRUCTIONS
Thank you for visiting [facility] ([facility]) today for your pre-procedure evaluation, you were seen by     JAN Almanza  Department of Anesthesiology and Perioperative Medicine  Main phone [phone]  Fax [phone]    This summary includes instructions and information to aid you during your perioperative period.  Please read carefully. If you have any questions about your visit today, please call the number listed above.  If you become ill or have any changes to your health before your surgery, please contact your primary care provider and alert your surgeon.    General Medications Instructions (see back for further medication instructions)  Hold Vit D supplementation day of surgery  Hold all other vitamins and supplements 7 days prior to surgery  Tylenol okay to continue, please hold Aleve/naproxen/ibuprofen (NSAIDs) for 7 days prior to surgery  No lotion/moisturizers or Deoderant after last shower prior to surgery    You will be called business day prior to surgery to confirm arrival time.     Preparing for Surgery       Preoperative Fasting Guidelines  Why must I stop eating and drinking near surgery time?  With sedation, food or liquid in your stomach can enter your lungs causing serious complications  Food can increase nausea and vomiting  When do I need to stop eating and drinking before my surgery?  Do not eat any food after midnight the night before your surgery/procedure.  You may have up to 13.5 ounces of clear liquid until TWO hours before your instructed arrival time to the hospital.  This includes water, black tea/coffee, (no milk or cream) apple juice, and electrolyte drinks (Gatorade)  You may chew gum until TWO hours before your surgery/procedure    Tobacco and Alcohol;  Do not drink alcohol or smoke within 24 hours of surgery.  It is best to quit smoking for as long as possible before any surgery or procedure.    Quitting Smoking; Recognizing Dangerous  Situations:  1-Alcohol use during the first month after quitting, 2-Being around smoke or someone who smokes 3-Times situation routinely smoked  4-Triggers-car, breaks, coffee, when awakening, social events  Coping Skills: 1-Learning new ways to manage stress 2-Exercising 3-Relaxation breathing   4-Change routines 5-Distraction techniques    Websites:  Smoke-Free - offers free text messages and an maria a to help you quit. Info includes eating and mood issues that may come with quitting. There is a Live Helpline to talk to an expert. Go to smokefree.gov; Become an Ex-Smoker - the free EX Plan is based on scientific research and useful advice from ex-smokers. It isn't just about quitting smoking.  It's about re-learning life without cigarettes using a 3-step program.  Go to becomeanex.SmartSynch   Centers for Disease Control offer many suggestions for helping you quit. Includes a Quit Guide and real-life stories. There are sections for specific groups such as LGBT, , different ethnic groups, and pregnant women.  Go to cdc.gov/tobacco/campaign/tips    Other Resources:  [place] Tobacco Quit Line - call 1-800-QUIT-NOW or 1-402.466.5059.  [facility] 2-1-1 - to find local programs and resources. Call 211 or go to Exerscrip.SmartSynch.  [facility] Tobacco Cessation Program - call 918-583-9425.  American Lung Association offers classes for quitting smoking. Some places may charge a fee. For a list of classes, go to lung.org or call 9-299-LUNG-USA.     Some things to think about:; The health benefits of quitting smoking can help most of the major parts of your body. There is no safe amount of cigarette smoke. Quitting smoking can add years to your life. When you quit, you'll also protect your loved ones from dangerous secondhand smoke. Make a plan, join a support group, and talk to your physician to assist in quitting smoking.                                                                                                               "     Other Instructions  Why did I have my nose, under my arms, and groin swabbed? The purpose of the swab is to identify Staphylococcus aureus inside your nose or on your skin.  The swab was sent to the laboratory for culture.  A positive swab/culture for Staphylococcus aureus is called colonization or carriage.   What is Staphylococcus aureus? Staphylococcus aureus, also known as \"staph\", is a germ found on the skin or in the nose of healthy people.  Sometimes Staphylococcus aureus can get into the body and cause an infection.  This can be minor (such as pimples, boils, or other skin problems).  It might also be serious (such as a blood infection, pneumonia, or a surgical site infection). What is Staphylococcus aureus colonization or carriage? Colonization or carriage means that a person has the germ but is not sick from it.  These bacteria can be spread on the hands or when breathing or sneezing. How is Staphylococcus aureus spread? It is most often spread by close contact with a person or item that carries it. What happens if my culture is positive for Staphylococcus aureus? Your doctor/medical team will use this information to guide any antibiotic treatment which may be necessary.  Regardless of the culture results, we will clean the inside of your nose with a betadine swab just before you have your surgery. Will I get an infection if I have Staphylococcus aureus in my nose or on my skin? Anyone can get an infection with Staphylococcus aureus.  However, the best way to reduce your risk of infection is to follow the instructions provided to you for the use of your CHG soap and dental rinse.  , Body Wash; What is a home preoperative antibacterial shower? This shower is a way of cleaning the skin with a germ-killing solution before surgery.  The solution contains chlorhexidine, commonly known as CHG.  CHG is a skin cleanser with germ-killing ability.  Let your doctor know if you are allergic to chlorhexidine. Why " do I need to take a preoperative antibacterial shower? Skin is not sterile.  It is best to try to make your skin as free of germs as possible before surgery.  Proper cleansing with a germ-killing soap before surgery can lower the number of germs on your skin.  This helps to reduce the risk of infection at the surgical site.  Following the instructions listed below will help you prepare your skin for surgery.   How do I use the solution? Steps:  Begin using your CHG soap 5 days before your scheduled surgery on ___________.   First, wash and rinse your hair using the CHG soap. Keep CHG soap away from ear canals and eyes.  Rinse completely, do not condition.  Hair extensions should be removed. , Oral/Dental Rinse: What is oral/dental rinse?  It is a mouthwash. It is a way of cleaning the mouth with a germ-killing solution before your surgery.  The solution contains chlorhexidine, commonly known as CHG. It is used inside the mouth to kill a bacteria known as Staphylococcus aureus.  Let your doctor know if you are allergic to Chlorhexidine. Why do I need to use CHG oral/dental rinse? The CHG oral/dental rinse helps to kill a bacteria in your mouth known as Staphylococcus aureus.  This reduces the risk of infection at the surgical site.  Using your CHG oral/dental rinse STEPS: Use your CHG oral/dental rinse after you brush your teeth the night before (at bedtime) and the morning of your surgery.  Follow all directions on your prescription label.  Use the cap on the container to measure 15 ml.  Swish (gargle if you can) the mouthwash in your mouth for at least 30 seconds, (do not swallow) and spit out.  After you use your CHG rinse, do not rinse your mouth with water, drink or eat.  Please refer to the prescription label for the appropriate time to resume oral intake What side effects might I have using the CHG oral/dental rinse? CHG rinse will stick to plaque on the teeth.  Brush and floss just before use.  Teeth brushing  will help avoid staining of plaque during use.     Ensure Surgery Immuno-Nutrition Shake; This shake provides specialized nutrients to help prepare your body for surgery. Your surgeon's office may send it to your home, or you may receive it from [facility] if you are scheduled for an appointment.  If your surgeon has instructed you to begin the shakes and you have not received them at least 7 days before your scheduled surgery, please contact your surgeon's office. You should begin drinking your shakes 6 days before your surgery date, start on _______.  You should drink 1 carton three times a day, you can have one carton with breakfast, lunch, and dinner.  If your surgeon has given you instructions to drink clear liquids the day before surgery, you can continue to drink your Ensure Surgery immune-nutrition shakes.     The Week before Surgery        Seven days before Surgery  Check your [facility] medication instructions  Do the exercises provided to you by [facility]   Arrange for a responsible, adult licensed  to take you home after surgery and stay with you for 24 hours.  You will not be permitted to drive yourself home if you have received any anesthetic/sedation  Five days before surgery  Check your [facility] medication instructions  Do the exercises provided to you by [facility]   Start using Chlorhexidene (CHG) body wash if prescribed (Continue till day of surgery)      The Day before Surgery       Check your [facility] medication and all other [facility] instructions including when to stop eating and drinking  You will be called with your arrival time for surgery in the late afternoon.  If you do not receive a call please reach out to your surgeon's office.  Do not smoke or drink 24 hours before surgery  Prepare items to bring with you to the hospital  Shower with your chlorhexidine wash if prescribed  Brush your teeth and use your chlorhexidine dental rinse if prescribed    The Day of Surgery       Check your [facility]  medication instructions  Shower, if prescribed use CHG.  Do not apply any lotions, creams, moisturizers, perfume or deodorant  Brush your teeth and use your CHG dental rinse if prescribed  Wear loose comfortable clothing  Avoid make-up  Remove  jewelry and piercings, consider professional piercing removal with a plastic spacer if needed  Bring photo ID and Insurance card  Bring an accurate medication list that includes medication dose, frequency and allergies  Bring a copy of your advanced directives (will, health care power of )  Bring any devices and controllers as well as medical devices you have been provided with for surgery (CPAP, slings, braces, etc.)  Dentures, eyeglasses, and contacts will be removed before surgery, please bring cases for contacts or glasses    Preoperative Deep Breathing Exercises    Why it is important to do deep breathing exercises before my surgery?  Deep breathing exercises strengthen your breathing muscles.  This helps you to recover after your surgery and decreases the chance of breathing complications.    How are the deep breathing exercises done?  Sit straight with your back supported.  Breathe in deeply and slowly through your nose. Your lower rib cage should expand and your abdomen may move forward.  Hold that breath for 3 to 5 seconds.  Breathe out through pursed lips, slowly and completely.  Rest and repeat 10 times every hour while awake.  Rest longer if you become dizzy or lightheaded.    Incentive Spirometry Incentive Spirometer   You were provided with an incentive spirometer in CPM/PAT, please follow the below instructions.  What is an incentive spirometer?  An incentive spirometer is a device used before and after surgery to “exercise” your lungs.  It helps you to take deeper breaths to expand your lungs.  Below is an example of a basic incentive spirometer.  The device you receive may differ slightly but they all function the same.    Why do I need to use an  incentive spirometer?  Using your incentive spirometer prepares your lungs for surgery and helps prevent lung problems after surgery.  How do I use my incentive spirometer?  When you're using your incentive spirometer, make sure to breathe through your mouth. If you breathe through your nose, the incentive spirometer won't work properly. You can hold your nose if you have trouble.  If you feel dizzy at any time, stop and rest. Try again at a later time.  Follow the steps below:  Set up your incentive spirometer, expand the flexible tubing and connect to the outlet.  Sit upright in a chair or bed. Hold the incentive spirometer at eye level.   Put the mouthpiece in your mouth and close your lips tightly around it. Slowly breathe out (exhale) completely.  Breathe in (inhale) slowly through your mouth as deeply as you can. As you take a breath, you will see the piston rise inside the large column. While the piston rises, the indicator should move upwards. It should stay in between the 2 arrows (see Figure).  Try to get the piston as high as you can, while keeping the indicator between the arrows.   If the indicator doesn't stay between the arrows, you're breathing either too fast or too slow.  When you get it as high as you can, hold your breath for 10 seconds, or as long as possible. While you're holding your breath, the piston will slowly fall to the base of the spirometer.  Once the piston reaches the bottom of the spirometer, breathe out slowly through your mouth. Rest for a few seconds.  Repeat 10 times. Try to get the piston to the same level with each breath.  Repeat every hour while awake  You can carefully clean the outside of the mouthpiece with an alcohol wipe or soap and water.       Preoperative Brain Exercises    What are brain exercises?  A brain exercise is any activity that engages your thinking (cognitive) skills.    What types of activities are considered brain exercises?  Jigsaw puzzles, crossword  puzzles, word jumble, memory games, word search, and many more.  Many can be found free online or on your phone via a mobile maria a.    Why should I do brain exercises before my surgery?  More recent research has shown brain exercise before surgery can lower the risk of postoperative delirium (confusion) which can be especially important for older adults.  Patients who did brain exercises for 5 to 10 hours the days before surgery, cut their risk of postoperative delirium in half up to 1 week after surgery.    Sit-to-Stand Exercise    What is the sit-to-stand exercise?  The sit-to-stand exercise strengthens the muscles of your lower body and muscles in the center of your body (core muscles for stability) helping to maintain and improve your strength and mobility.  How do I do the sit-to-stand exercise?  The goal is to do this exercise without using your arms or hands.  If this is too difficult, use your arms and hands or a chair with armrests to help slowly push yourself to the standing position and lower yourself back to the sitting position. As the movement becomes easier use your arms and hands less.    Steps to the sit-to-stand exercise  Sit up tall in a sturdy chair, knees bent, feet flat on the floor shoulder-width apart.  Shift your hips/pelvis forward in the chair to correctly position yourself for the next movement.  Lean forward at your hips.  Stand up straight putting equal weight on both feet.  Check to be sure you are properly aligned with the chair, in a slow controlled movement sit back down.  Repeat this exercise 10-15 times.  If needed you can do it fewer times until your strength improves.  Rest for 1 minute.  Do another 10-15 sit-to-stand exercises.  Try to do this in the morning and evening.       Simple things you can do to help prevent blood clots     Blood clots are blockages that can form in the body's veins. When a blood clot forms in your deep veins, it may be called a deep vein thrombosis, or  DVT for short. Blood clots can happen in any part of the body where blood flows, but they are most common in the arms and legs. If a piece of a blood clot breaks free and travels to the lungs, it is called a pulmonary embolus (PE). A PE can be a very serious problem.      Being in the hospital or having surgery can raise your chances of getting a blood clot because you may not be well enough to move around as much as you normally do.         Ways you can help prevent blood clots in the hospital       Wearing SCDs  SCDs stands for Sequential Compression Devices.   SCDs are special sleeves that wrap around your legs. They attach to a pump that fills them with air to gently squeeze your legs every few minutes.  This helps return the blood in your legs to your heart.   SCDs should only be taken off when walking or bathing. SCDs may not be comfortable, but they can help save your life.              Pump SCD leg sleeves  Wearing compression stockings - if your doctor orders them. These special snug-fitting stockings gently squeeze your legs to help blood flow.       Walking. Walking helps move the blood in your legs.   If your doctor says it is ok, try walking the halls at least   5 times a day. Ask us to help you get up, so you don't fall.      Taking any blood-thinning medicines your doctor orders.              Ways you can help prevent blood clots at home         Wearing compression stockings - if your doctor orders them.   Walking - to help move the blood in your legs.    Taking any blood-thinning medicines your doctor orders.      Signs of a blood clot or PE    Tell your doctor or nurse right away if you have any of the problems listed below.         If you are at home, seek medical care right away. Call 911 for chest pain or problems breathing.            Signs of a blood clot (DVT) - such as pain, swelling, redness, or warmth in your arm or legs.  Signs of a pulmonary embolism (PE) - such as chest pain or feeling  short of breath

## 2025-06-03 ENCOUNTER — APPOINTMENT (OUTPATIENT)
Dept: RADIOLOGY | Facility: HOSPITAL | Age: 65
End: 2025-06-03
Payer: COMMERCIAL

## 2025-06-03 ENCOUNTER — ANESTHESIA (OUTPATIENT)
Dept: OPERATING ROOM | Facility: HOSPITAL | Age: 65
End: 2025-06-03
Payer: COMMERCIAL

## 2025-06-03 ENCOUNTER — HOSPITAL ENCOUNTER (OUTPATIENT)
Facility: HOSPITAL | Age: 65
Setting detail: OUTPATIENT SURGERY
Discharge: HOME | End: 2025-06-03
Attending: SURGERY | Admitting: SURGERY
Payer: COMMERCIAL

## 2025-06-03 VITALS
OXYGEN SATURATION: 95 % | TEMPERATURE: 97.2 F | WEIGHT: 146.16 LBS | SYSTOLIC BLOOD PRESSURE: 119 MMHG | HEART RATE: 67 BPM | BODY MASS INDEX: 23.49 KG/M2 | HEIGHT: 66 IN | RESPIRATION RATE: 14 BRPM | DIASTOLIC BLOOD PRESSURE: 70 MMHG

## 2025-06-03 DIAGNOSIS — I70.213 ATHEROSCLEROSIS OF NATIVE ARTERIES OF EXTREMITIES WITH INTERMITTENT CLAUDICATION, BILATERAL LEGS: Primary | ICD-10-CM

## 2025-06-03 DIAGNOSIS — I73.9 PAD (PERIPHERAL ARTERY DISEASE): ICD-10-CM

## 2025-06-03 LAB
ACT BLD: 178 SEC (ref 83–199)
ACT BLD: 331 SEC (ref 83–199)
ACT BLD: 337 SEC (ref 83–199)
STAPHYLOCOCCUS SPEC CULT: NORMAL

## 2025-06-03 PROCEDURE — 7100000001 HC RECOVERY ROOM TIME - INITIAL BASE CHARGE: Performed by: SURGERY

## 2025-06-03 PROCEDURE — 3600000008 HC OR TIME - EACH INCREMENTAL 1 MINUTE - PROCEDURE LEVEL THREE: Performed by: SURGERY

## 2025-06-03 PROCEDURE — 7100000009 HC PHASE TWO TIME - INITIAL BASE CHARGE: Performed by: SURGERY

## 2025-06-03 PROCEDURE — 2500000004 HC RX 250 GENERAL PHARMACY W/ HCPCS (ALT 636 FOR OP/ED): Mod: JZ | Performed by: ANESTHESIOLOGY

## 2025-06-03 PROCEDURE — 3700000001 HC GENERAL ANESTHESIA TIME - INITIAL BASE CHARGE: Performed by: SURGERY

## 2025-06-03 PROCEDURE — 3600000003 HC OR TIME - INITIAL BASE CHARGE - PROCEDURE LEVEL THREE: Performed by: SURGERY

## 2025-06-03 PROCEDURE — C1725 CATH, TRANSLUMIN NON-LASER: HCPCS | Performed by: SURGERY

## 2025-06-03 PROCEDURE — C2623 CATH, TRANSLUMIN, DRUG-COAT: HCPCS | Performed by: SURGERY

## 2025-06-03 PROCEDURE — C1887 CATHETER, GUIDING: HCPCS | Performed by: SURGERY

## 2025-06-03 PROCEDURE — A37220 PR REVASCULARIZE ILIAC ARTERY,ANGIOPLASTY, INITIAL VESSEL

## 2025-06-03 PROCEDURE — 2720000007 HC OR 272 NO HCPCS: Performed by: SURGERY

## 2025-06-03 PROCEDURE — 2550000001 HC RX 255 CONTRASTS: Performed by: SURGERY

## 2025-06-03 PROCEDURE — A37220 PR REVASCULARIZE ILIAC ARTERY,ANGIOPLASTY, INITIAL VESSEL: Performed by: ANESTHESIOLOGY

## 2025-06-03 PROCEDURE — 3700000002 HC GENERAL ANESTHESIA TIME - EACH INCREMENTAL 1 MINUTE: Performed by: SURGERY

## 2025-06-03 PROCEDURE — 85347 COAGULATION TIME ACTIVATED: CPT

## 2025-06-03 PROCEDURE — 2780000003 HC OR 278 NO HCPCS: Performed by: SURGERY

## 2025-06-03 PROCEDURE — 2500000005 HC RX 250 GENERAL PHARMACY W/O HCPCS: Performed by: SURGERY

## 2025-06-03 PROCEDURE — 7100000010 HC PHASE TWO TIME - EACH INCREMENTAL 1 MINUTE: Performed by: SURGERY

## 2025-06-03 PROCEDURE — C1769 GUIDE WIRE: HCPCS | Performed by: SURGERY

## 2025-06-03 PROCEDURE — 96373 THER/PROPH/DIAG INJ IA: CPT | Performed by: SURGERY

## 2025-06-03 PROCEDURE — 2500000004 HC RX 250 GENERAL PHARMACY W/ HCPCS (ALT 636 FOR OP/ED): Performed by: SURGERY

## 2025-06-03 PROCEDURE — 37224 PR REVSC OPN/PRG FEM/POP W/ANGIOPLASTY UNI: CPT | Performed by: SURGERY

## 2025-06-03 PROCEDURE — 75710 ARTERY X-RAYS ARM/LEG: CPT | Performed by: SURGERY

## 2025-06-03 PROCEDURE — 2500000004 HC RX 250 GENERAL PHARMACY W/ HCPCS (ALT 636 FOR OP/ED)

## 2025-06-03 PROCEDURE — 7100000002 HC RECOVERY ROOM TIME - EACH INCREMENTAL 1 MINUTE: Performed by: SURGERY

## 2025-06-03 PROCEDURE — C1894 INTRO/SHEATH, NON-LASER: HCPCS | Performed by: SURGERY

## 2025-06-03 PROCEDURE — C1760 CLOSURE DEV, VASC: HCPCS | Performed by: SURGERY

## 2025-06-03 RX ORDER — MIDAZOLAM HYDROCHLORIDE 1 MG/ML
INJECTION INTRAMUSCULAR; INTRAVENOUS AS NEEDED
Status: DISCONTINUED | OUTPATIENT
Start: 2025-06-03 | End: 2025-06-03

## 2025-06-03 RX ORDER — SODIUM CHLORIDE 0.9 G/100ML
INJECTION, SOLUTION IRRIGATION AS NEEDED
Status: DISCONTINUED | OUTPATIENT
Start: 2025-06-03 | End: 2025-06-03 | Stop reason: HOSPADM

## 2025-06-03 RX ORDER — HEPARIN SODIUM 1000 [USP'U]/ML
INJECTION, SOLUTION INTRAVENOUS; SUBCUTANEOUS AS NEEDED
Status: DISCONTINUED | OUTPATIENT
Start: 2025-06-03 | End: 2025-06-03

## 2025-06-03 RX ORDER — PROTAMINE SULFATE 10 MG/ML
INJECTION, SOLUTION INTRAVENOUS AS NEEDED
Status: DISCONTINUED | OUTPATIENT
Start: 2025-06-03 | End: 2025-06-03

## 2025-06-03 RX ORDER — IODIXANOL 320 MG/ML
INJECTION, SOLUTION INTRAVASCULAR AS NEEDED
Status: DISCONTINUED | OUTPATIENT
Start: 2025-06-03 | End: 2025-06-03 | Stop reason: HOSPADM

## 2025-06-03 RX ORDER — ROCURONIUM BROMIDE 10 MG/ML
INJECTION, SOLUTION INTRAVENOUS AS NEEDED
Status: DISCONTINUED | OUTPATIENT
Start: 2025-06-03 | End: 2025-06-03

## 2025-06-03 RX ORDER — FENTANYL CITRATE 50 UG/ML
INJECTION, SOLUTION INTRAMUSCULAR; INTRAVENOUS AS NEEDED
Status: DISCONTINUED | OUTPATIENT
Start: 2025-06-03 | End: 2025-06-03

## 2025-06-03 RX ORDER — CEFAZOLIN 1 G/1
INJECTION, POWDER, FOR SOLUTION INTRAVENOUS AS NEEDED
Status: DISCONTINUED | OUTPATIENT
Start: 2025-06-03 | End: 2025-06-03

## 2025-06-03 RX ORDER — SODIUM CHLORIDE, SODIUM LACTATE, POTASSIUM CHLORIDE, CALCIUM CHLORIDE 600; 310; 30; 20 MG/100ML; MG/100ML; MG/100ML; MG/100ML
100 INJECTION, SOLUTION INTRAVENOUS CONTINUOUS
Status: DISCONTINUED | OUTPATIENT
Start: 2025-06-03 | End: 2025-06-03 | Stop reason: HOSPADM

## 2025-06-03 RX ORDER — HYDROMORPHONE HYDROCHLORIDE 0.2 MG/ML
0.2 INJECTION INTRAMUSCULAR; INTRAVENOUS; SUBCUTANEOUS EVERY 5 MIN PRN
Status: DISCONTINUED | OUTPATIENT
Start: 2025-06-03 | End: 2025-06-03 | Stop reason: HOSPADM

## 2025-06-03 RX ORDER — METOPROLOL TARTRATE 1 MG/ML
INJECTION, SOLUTION INTRAVENOUS AS NEEDED
Status: DISCONTINUED | OUTPATIENT
Start: 2025-06-03 | End: 2025-06-03

## 2025-06-03 RX ORDER — ONDANSETRON HYDROCHLORIDE 2 MG/ML
4 INJECTION, SOLUTION INTRAVENOUS ONCE AS NEEDED
Status: DISCONTINUED | OUTPATIENT
Start: 2025-06-03 | End: 2025-06-03 | Stop reason: HOSPADM

## 2025-06-03 RX ORDER — LIDOCAINE HCL/PF 100 MG/5ML
SYRINGE (ML) INTRAVENOUS AS NEEDED
Status: DISCONTINUED | OUTPATIENT
Start: 2025-06-03 | End: 2025-06-03

## 2025-06-03 RX ORDER — PROPOFOL 10 MG/ML
INJECTION, EMULSION INTRAVENOUS AS NEEDED
Status: DISCONTINUED | OUTPATIENT
Start: 2025-06-03 | End: 2025-06-03

## 2025-06-03 RX ORDER — OXYCODONE HYDROCHLORIDE 5 MG/1
5 TABLET ORAL EVERY 4 HOURS PRN
Status: DISCONTINUED | OUTPATIENT
Start: 2025-06-03 | End: 2025-06-03 | Stop reason: HOSPADM

## 2025-06-03 RX ORDER — LIDOCAINE HYDROCHLORIDE 10 MG/ML
0.1 INJECTION, SOLUTION INFILTRATION; PERINEURAL ONCE
Status: DISCONTINUED | OUTPATIENT
Start: 2025-06-03 | End: 2025-06-03 | Stop reason: HOSPADM

## 2025-06-03 RX ORDER — DROPERIDOL 2.5 MG/ML
0.62 INJECTION, SOLUTION INTRAMUSCULAR; INTRAVENOUS ONCE AS NEEDED
Status: DISCONTINUED | OUTPATIENT
Start: 2025-06-03 | End: 2025-06-03 | Stop reason: HOSPADM

## 2025-06-03 RX ORDER — ONDANSETRON HYDROCHLORIDE 2 MG/ML
INJECTION, SOLUTION INTRAVENOUS AS NEEDED
Status: DISCONTINUED | OUTPATIENT
Start: 2025-06-03 | End: 2025-06-03

## 2025-06-03 RX ORDER — MEPERIDINE HYDROCHLORIDE 25 MG/ML
12.5 INJECTION INTRAMUSCULAR; INTRAVENOUS; SUBCUTANEOUS EVERY 10 MIN PRN
Status: DISCONTINUED | OUTPATIENT
Start: 2025-06-03 | End: 2025-06-03 | Stop reason: HOSPADM

## 2025-06-03 RX ORDER — PHENYLEPHRINE HCL IN 0.9% NACL 0.4MG/10ML
SYRINGE (ML) INTRAVENOUS AS NEEDED
Status: DISCONTINUED | OUTPATIENT
Start: 2025-06-03 | End: 2025-06-03

## 2025-06-03 RX ADMIN — FENTANYL CITRATE 50 MCG: 50 INJECTION, SOLUTION INTRAMUSCULAR; INTRAVENOUS at 12:07

## 2025-06-03 RX ADMIN — HYDROMORPHONE HYDROCHLORIDE 0.2 MG: 0.2 INJECTION, SOLUTION INTRAMUSCULAR; INTRAVENOUS; SUBCUTANEOUS at 13:01

## 2025-06-03 RX ADMIN — MIDAZOLAM HYDROCHLORIDE 2 MG: 2 INJECTION, SOLUTION INTRAMUSCULAR; INTRAVENOUS at 10:56

## 2025-06-03 RX ADMIN — Medication 40 MCG: at 12:22

## 2025-06-03 RX ADMIN — Medication 80 MCG: at 11:34

## 2025-06-03 RX ADMIN — LIDOCAINE HYDROCHLORIDE 80 MG: 20 INJECTION INTRAVENOUS at 11:01

## 2025-06-03 RX ADMIN — ONDANSETRON 4 MG: 2 INJECTION INTRAMUSCULAR; INTRAVENOUS at 12:27

## 2025-06-03 RX ADMIN — SUGAMMADEX 200 MG: 100 INJECTION, SOLUTION INTRAVENOUS at 12:34

## 2025-06-03 RX ADMIN — Medication 80 MCG: at 11:03

## 2025-06-03 RX ADMIN — HEPARIN SODIUM 7000 UNITS: 1000 INJECTION INTRAVENOUS; SUBCUTANEOUS at 11:40

## 2025-06-03 RX ADMIN — ROCURONIUM BROMIDE 50 MG: 10 INJECTION INTRAVENOUS at 11:01

## 2025-06-03 RX ADMIN — PROPOFOL 150 MG: 10 INJECTION, EMULSION INTRAVENOUS at 11:04

## 2025-06-03 RX ADMIN — PROTAMINE SULFATE 40 MG: 10 INJECTION, SOLUTION INTRAVENOUS at 12:27

## 2025-06-03 RX ADMIN — Medication 40 MCG: at 12:27

## 2025-06-03 RX ADMIN — ROCURONIUM BROMIDE 20 MG: 10 INJECTION INTRAVENOUS at 11:33

## 2025-06-03 RX ADMIN — SODIUM CHLORIDE, SODIUM LACTATE, POTASSIUM CHLORIDE, AND CALCIUM CHLORIDE: 600; 310; 30; 20 INJECTION, SOLUTION INTRAVENOUS at 10:57

## 2025-06-03 RX ADMIN — METOPROLOL TARTRATE 5 MG: 1 INJECTION, SOLUTION INTRAVENOUS at 11:01

## 2025-06-03 RX ADMIN — CEFAZOLIN 2 G: 1 INJECTION, POWDER, FOR SOLUTION INTRAMUSCULAR; INTRAVENOUS at 11:13

## 2025-06-03 RX ADMIN — ROCURONIUM BROMIDE 10 MG: 10 INJECTION INTRAVENOUS at 12:07

## 2025-06-03 RX ADMIN — DEXAMETHASONE SODIUM PHOSPHATE 4 MG: 4 INJECTION INTRA-ARTICULAR; INTRALESIONAL; INTRAMUSCULAR; INTRAVENOUS; SOFT TISSUE at 11:09

## 2025-06-03 RX ADMIN — ROCURONIUM BROMIDE 20 MG: 10 INJECTION INTRAVENOUS at 12:22

## 2025-06-03 ASSESSMENT — PAIN SCALES - GENERAL
PAINLEVEL_OUTOF10: 0 - NO PAIN
PAINLEVEL_OUTOF10: 2
PAINLEVEL_OUTOF10: 0 - NO PAIN
PAINLEVEL_OUTOF10: 0 - NO PAIN
PAINLEVEL_OUTOF10: 4
PAINLEVEL_OUTOF10: 0 - NO PAIN
PAINLEVEL_OUTOF10: 0 - NO PAIN
PAINLEVEL_OUTOF10: 3
PAINLEVEL_OUTOF10: 0 - NO PAIN

## 2025-06-03 ASSESSMENT — PAIN - FUNCTIONAL ASSESSMENT
PAIN_FUNCTIONAL_ASSESSMENT: 0-10

## 2025-06-03 ASSESSMENT — COLUMBIA-SUICIDE SEVERITY RATING SCALE - C-SSRS
2. HAVE YOU ACTUALLY HAD ANY THOUGHTS OF KILLING YOURSELF?: NO
6. HAVE YOU EVER DONE ANYTHING, STARTED TO DO ANYTHING, OR PREPARED TO DO ANYTHING TO END YOUR LIFE?: NO
1. IN THE PAST MONTH, HAVE YOU WISHED YOU WERE DEAD OR WISHED YOU COULD GO TO SLEEP AND NOT WAKE UP?: NO

## 2025-06-03 NOTE — ANESTHESIA PROCEDURE NOTES
Airway  Date/Time: 6/3/2025 11:04 AM  Reason: elective    Airway not difficult    Staffing  Performed: CAMILO   Authorized by: Jonathan Rizzo MD    Performed by: Louis Lehman RN  Patient location during procedure: OR    Patient Condition  Indications for airway management: anesthesia and airway protection  Patient position: sniffing  Sedation level: deep     Final Airway Details   Preoxygenated: yes  Final airway type: endotracheal airway  Successful airway: ETT  Cuffed: yes   Successful intubation technique: direct laryngoscopy  Adjuncts used in placement: intubating stylet  Endotracheal tube insertion site: oral  Blade: Lilia  Blade size: #4  ETT size (mm): 7.5  Cormack-Lehane Classification: grade IIa - partial view of glottis  Placement verified by: chest auscultation and capnometry   Cuff volume (mL): 6  Measured from: teeth  ETT to teeth (cm): 22  Number of attempts at approach: 1

## 2025-06-03 NOTE — ANESTHESIA PREPROCEDURE EVALUATION
"Patient: Iam Acevedo \"Brandon\"    Procedure Information       Date/Time: 06/03/25 1000    Procedure: ANGIOGRAM, LOWER EXTREMITY (Left)    Location: [facility] OR 12 / [facility]    Surgeons: Mabel Turner MD            Relevant Problems   Cardiac   (+) CAD (coronary artery disease)   (+) Hypertension   (+) PAD (peripheral artery disease)      Pulmonary   (+) COPD (chronic obstructive pulmonary disease) (Multi)   (+) Malignant neoplasm of lower lobe of left lung (Multi)      Musculoskeletal   (+) Primary osteoarthritis of left wrist       Clinical information reviewed:   Tobacco  Allergies  Meds   Med Hx  Surg Hx   Fam Hx  Soc Hx        NPO Detail:  NPO/Void Status  Carbohydrate Drink Given Prior to Surgery? : N  Date of Last Liquid: 06/02/25  Time of Last Liquid: 2200  Date of Last Solid: 06/02/25  Time of Last Solid: 2200  Last Intake Type: Clear fluids  Time of Last Void: 0836         PHYSICAL EXAM    Anesthesia Plan    History of general anesthesia?: yes  History of complications of general anesthesia?: no    ASA 3     general and MAC     intravenous induction   Anesthetic plan and risks discussed with patient.    Plan discussed with CRNA.    "

## 2025-06-03 NOTE — ANESTHESIA POSTPROCEDURE EVALUATION
"Patient: Iam Acevedo \"Brandon\"    Procedure Summary       Date: 06/03/25 Room / Location: [facility] OR 12 / [facility] OR    Anesthesia Start: 1053 Anesthesia Stop: 1247    Procedure: ANGIOGRAM, LOWER EXTREMITY, BALLOON ANGIOPLASTY, SFA AND POPLITEAL ARTERY (Right) Diagnosis:       Atherosclerosis of native arteries of extremities with intermittent claudication, bilateral legs      (Atherosclerosis of native arteries of extremities with intermittent claudication, bilateral legs [I70.213])    Surgeons: Mabel Turner MD Responsible Provider: Jonathan Rizzo MD    Anesthesia Type: general, MAC ASA Status: 3            Anesthesia Type: general, MAC    Vitals Value Taken Time   /88 06/03/25 12:46   Temp 36 06/03/25 13:02   Pulse 61 06/03/25 12:58   Resp 17 06/03/25 12:58   SpO2 92 % 06/03/25 12:58   Vitals shown include unfiled device data.    Anesthesia Post Evaluation    Patient location during evaluation: PACU  Patient participation: complete - patient participated  Level of consciousness: awake  Pain management: adequate  Airway patency: patent  Cardiovascular status: acceptable  Respiratory status: acceptable  Hydration status: acceptable  Postoperative Nausea and Vomiting: none        There were no known notable events for this encounter.    "

## 2025-06-03 NOTE — DISCHARGE INSTRUCTIONS
Discharge Instructions    BRIEF OVERVIEW  Admitting Provider: Mabel Turner MD  Discharge Provider: Mabel Turner MD  Primary Care Physician at Discharge: Ángel Mccarty, APRN--[phone]     Admission Date: 6/3/2025     Discharge Date: 6/3/2025    Reason for Encounter  Short distance claudication, presented for right leg angiogram. We balloon angioplastied the R pop and SFA segments with DCB, there was improved flow afterwards.   Please resume eliquis and aspirin TOMORROW.     Call your doctor if you have any of the following:  A fever of 100.5 °F (38.0 °C) or higher  Pain, bloating, cramping, or tenderness associated with your incisions  Nausea or vomiting  Swelling around your wound  Pain on your wound that doesn't go away with medication  Bleeding from your incisions  Any of the following signs of wound infection:   Swelling  Increased pain  Warmth at the wound site  Drainage that looks like pus (thick and milky)    Diet  Resume your regular diet at home    Medications  - You should take tylenol 650mg every 6 hours for pain.  - You should take a stool softener to help you have at least one soft bowel movement daily.   - You may resume your previous medications unless otherwise instructed.     Activity  No heavy lifting > 10 lbs for 4 weeks or until approved by your doctor. and Go home today and rest.  You may resume normal activity tomorrow.  Do not operative any heavy machinery or make important decisions for the next 24 hours.    Wound Care  You have skin glue on your incision, this will flake off in 10-14 days. You are ok to shower. Do not soak in water for at least 2 weeks. Wash incision with soapy water but do not scrub. Pat incision dry.     Outpatient Follow-Up  Future Appointments   Date Time Provider Department Center   7/8/2025  9:15 AM Edmundo Lambert MD [id number]   We will follow up in 4 weeks with repeat vascular imaging     Contact  If you have any questions or to schedule an  appointment, please call the vascular surgery clinic phone line: [phone]

## 2025-06-03 NOTE — BRIEF OP NOTE
"Date: 6/3/2025  OR Location: [facility] OR    Name: Iam Acevedo \"Brandon\", : 1960, Age: 65 y.o., MRN: [id number], Sex: male    Diagnosis  Pre-op Diagnosis      * Atherosclerosis of native arteries of extremities with intermittent claudication, bilateral legs [I70.213] Post-op Diagnosis     * Atherosclerosis of native arteries of extremities with intermittent claudication, bilateral legs [I70.213]     Procedures  ANGIOGRAM, LOWER EXTREMITY, BALLOON ANGIOPLASTY, SFA AND POPLITEAL ARTERY  03731 - CHG ANGIOGRAPHY EXTREMITY UNILATERAL RS&I  Ultrasound-guided left common femoral artery access  Aortogram  Right lower extremity third order selective angiogram  DCB of right SFA/popliteal segments with inpact 6mm balloon    Surgeons      * Mabel Turner - Primary    Resident/Fellow/Other Assistant:  Surgeons and Role:     * Zenobia Raygoza MD - Resident - Assisting     * Jimy Murphy MD - Fellow    Staff:   Circulator: Lopez  Circulator: Jaycob Alfordub Person: Kemi Sanderson Scrub: Latesha    Anesthesia Staff: Anesthesiologist: Jonathan Rizzo MD  CRNA: Hasmukh Reich APRN-CRNA  SRNA: Louis Lehman RN  Frontline Breaker: CIRO Carpenter    Procedure Summary  Anesthesia: General, Monitor Anesthesia Care  ASA: III  Estimated Blood Loss: 25mL  Intra-op Medications:   Administrations occurring from 1000 to 1300 on 25:   Medication Name Total Dose   iodixanol (VISIPaque) 320 mg iodine/mL injection 50 mL   heparin (porcine) 5,000 Units in sodium chloride 0.9% 500 mL irrigation 5,000 Units   heparin (porcine) 5,000 Units in sodium chloride 0.9% 500 mL irrigation 5,000 Units   heparin (porcine) 5,000 Units in sodium chloride 0.9% 500 mL irrigation 5,000 Units   sodium chloride 0.9 % irrigation solution 1,000 mL   ceFAZolin (Ancef) vial 1 g 2 g   dexAMETHasone (Decadron) 4 mg/mL IV Syringe 2 mL 4 mg   fentaNYL (Sublimaze) injection 50 mcg/mL 50 mcg   heparin injection 1,000 units/mL 7,000 Units   LR bolus " Cannot be calculated   lidocaine (cardiac) injection 2% prefilled syringe 80 mg   metoprolol tartrate (Lopressor) injection 5 mg   midazolam PF (Versed) injection 1 mg/mL 2 mg   ondansetron (Zofran) 2 mg/mL injection 4 mg   phenylephrine 40 mcg/mL syringe 10 mL 240 mcg   propofol (Diprivan) injection 10 mg/mL 150 mg   protamine injection 40 mg   rocuronium (ZeMuron) 50 mg/5 mL injection 100 mg   sugammadex (Bridion) 200 mg/2 mL injection 200 mg              Anesthesia Record               Intraprocedure I/O Totals          Intake    LR bolus 600.00 mL    Total Intake 600 mL       Output    Est. Blood Loss 25 mL    Total Output 25 mL       Net    Net Volume 575 mL          Specimen: No specimens collected     Findings: patent aortoiliac segments without flow limiting disease  R moderate to severe SFA and above knee popliteal disease with 2 short segments occlusions.   POBA balloon angioplastied follow by DCB with 6mm balloon  Post angioplasty angiogram showed improvement in both occlusion with segment of dissection. Ballooned with improvement in dissection flap.     Post: palpable L fem, palpable L DP  Multiphasic R PT and monophasic DP    Complications:  None; patient tolerated the procedure well.     Disposition: PACU - hemodynamically stable.  Condition: stable  Specimens Collected: No specimens collected  Attending Attestation:     Mabel Turner  Phone Number: [phone]

## 2025-06-04 NOTE — OP NOTE
"ANGIOGRAM, LOWER EXTREMITY, BALLOON ANGIOPLASTY, SFA AND POPLITEAL ARTERY (R) Operative Note     Date: 6/3/2025  OR Location: [facility] OR    Name: Iam Acevedo \"Brandon\", : 1960, Age: 65 y.o., MRN: [id number], Sex: male    Diagnosis  Pre-op Diagnosis      * Atherosclerosis of native arteries of extremities with intermittent claudication, bilateral legs [I70.213] Post-op Diagnosis     * Atherosclerosis of native arteries of extremities with intermittent claudication, bilateral legs [I70.213]     Procedures  Right lower extremity angiogram, balloon angioplasty superficial femoral and popliteal arteries, ultrasound guided access and percutaneous closure of the left common femoral artery    Surgeons      * Mabel Turner - Primary    Resident/Fellow/Other Assistant:  Surgeons and Role:     * Zenobia Raygoza MD - Resident - Assisting     * Jimy Murphy MD - Fellow    Staff:   Circulator: Lopez  Circulator: Jaycob Alfordub Person: Kemi Sanderson Scrub: Latesha    Anesthesia Staff: Anesthesiologist: Jonathan Rizzo MD  CRNA: KAILEY Hardy-CRNA  SRNA: Louis Lehman RN  Frontline Breaker: CIRO Carpenter    Procedure Summary  Anesthesia: General, Monitor Anesthesia Care  ASA: III  Estimated Blood Loss: 25 mL  Intra-op Medications:   Administrations occurring from 1000 to 1300 on 25:   Medication Name Total Dose   iodixanol (VISIPaque) 320 mg iodine/mL injection 50 mL   heparin (porcine) 5,000 Units in sodium chloride 0.9% 500 mL irrigation 5,000 Units   heparin (porcine) 5,000 Units in sodium chloride 0.9% 500 mL irrigation 5,000 Units   heparin (porcine) 5,000 Units in sodium chloride 0.9% 500 mL irrigation 5,000 Units   sodium chloride 0.9 % irrigation solution 1,000 mL   ceFAZolin (Ancef) vial 1 g 2 g   dexAMETHasone (Decadron) 4 mg/mL IV Syringe 2 mL 4 mg   fentaNYL (Sublimaze) injection 50 mcg/mL 50 mcg   heparin injection 1,000 units/mL 7,000 Units   LR bolus Cannot be calculated " "  lactated Ringer's infusion Cannot be calculated   lidocaine (cardiac) injection 2% prefilled syringe 80 mg   metoprolol tartrate (Lopressor) injection 5 mg   midazolam PF (Versed) injection 1 mg/mL 2 mg   ondansetron (Zofran) 2 mg/mL injection 4 mg   phenylephrine 40 mcg/mL syringe 10 mL 240 mcg   propofol (Diprivan) injection 10 mg/mL 150 mg   protamine injection 40 mg   rocuronium (ZeMuron) 50 mg/5 mL injection 100 mg   sugammadex (Bridion) 200 mg/2 mL injection 200 mg              Anesthesia Record               Intraprocedure I/O Totals          Intake    LR bolus 600.00 mL    Total Intake 600 mL       Output    Est. Blood Loss 25 mL    Total Output 25 mL       Net    Net Volume 575 mL        Findings: occlusion of distal SFA and proximal popliteal artery    Indications: Iam Acevedo \"Brandon\" is an 65 y.o. male who is having surgery for Atherosclerosis of native arteries of extremities with intermittent claudication, bilateral legs [I70.213].     The patient was seen in the preoperative area. The risks, benefits, complications, treatment options, non-operative alternatives, expected recovery and outcomes were discussed with the patient. The possibilities of reaction to medication, pulmonary aspiration, injury to surrounding structures, bleeding, recurrent infection, the need for additional procedures, failure to diagnose a condition, and creating a complication requiring transfusion or operation were discussed with the patient. The patient concurred with the proposed plan, giving informed consent.  The site of surgery was properly noted/marked if necessary per policy. The patient has been actively warmed in preoperative area. Preoperative antibiotics have been ordered and given within 1 hours of incision. Venous thrombosis prophylaxis are not indicated.    Procedure Details:   Patient was placed on the operating table in supine position with the arms tucked.  Patient, procedure, site were confirmed in " preprocedure huddle.  General anesthesia was induced and endotracheal intubation was performed anesthesia team.  The bilateral groins were prepped and draped in the usual sterile fashion.  The procedure was begun with ultrasound-guided identification of the left common femoral artery and access under ultrasound guidance with a micro needle and wire using modified Seldinger technique.  This was exchanged for the micro sheath and 0.035 Bentson wire was placed in the aorta and the micro sheath was exchanged for 5 Swiss catheter.  A UF catheter was placed in the distal aorta and initial aortogram demonstrated patent common, internal, external iliac arteries with mild atherosclerotic disease but no significant stenoses.  The Bentson wire was used through the UF catheter to cross up and over the aortic bifurcation and the tip of the UF catheter was placed in the external iliac artery.  Right lower extremity angiography was then performed through the catheter.  The common femoral artery and profunda were patent without hemodynamically significant disease.  The superficial femoral artery was noted to have mild to moderate disease throughout its proximal and mid course, it did occlude in the distal thigh and reconstituted at the P2 segment of the popliteal artery.  The tibial trifurcation was noted to have some disease and there was PT dominant runoff into the foot.  The peroneal artery became stenotic in the mid calf and the anterior tibial artery became stenotic at the level of the ankle.    A stiff angled Glidewire was introduced through the UF catheter and parked in the proximal SFA.  Over this wire the 5 Swiss sheath was exchanged for a 6 Swiss 45 cm destination sheath with the tip parked in the common femoral artery.  The patient was systemically heparinized at this time and maintained an activated clotting time over 250 seconds for the duration of the case.  An 035 quick cross catheter was used to exchange the  stiff angled Glidewire for a command 018 wire.  This command 018 wire was used to cross the SFA/popliteal occlusion, the quick cross catheter was advanced into the below-knee popliteal artery.  Angiography confirmed successful lesion crossing with intraluminal access to the below-knee popliteal artery.  The occlusion was initially dilated with a 3 mm x 120 mm plan balloon angioplasty.  Following this the artery was treated with a 6 mm drug-coated balloon, with an inflation time of 3 minutes at nominal pressure 8 yohan.  Angiography following the DCB was notable for a dissection at the P1 segment of the popliteal artery.  This dissection was treated with a 5 minute inflation of the 6 mm balloon at 4 yohan.  Angiography following this demonstrated improvement in the dissection flap without any flow limitation.    The destination sheath was then withdrawn into the aorta and the 018 command wire was exchanged for the 035 Bentson wire.  The destination sheath was removed and the arteriotomy was closed with a prostyle closure device with satisfactory hemostasis.  Doppler signal exam on the access side was unchanged from preprocedure.  The patient was emerged from anesthesia, extubated, and taken to recovery room in stable condition.    Evidence of Infection: No   Complications:  None; patient tolerated the procedure well.    Disposition: PACU - hemodynamically stable.  Condition: stable     Attending Attestation:     Mabel Turner  Phone Number: [phone]

## 2025-06-05 ENCOUNTER — APPOINTMENT (OUTPATIENT)
Dept: HEMATOLOGY/ONCOLOGY | Facility: CLINIC | Age: 65
End: 2025-06-05
Payer: COMMERCIAL

## 2025-06-20 NOTE — PROGRESS NOTES
"Pharmacy Medication History Review    Iam Acevedo \"Brandon\" is a 65 y.o. male who is planned to be admitted for Malignant neoplasm of lower lobe of left lung (Multi). Pharmacy called the patient prior to their scheduled procedure and reviewed the patient's [id number] medications for accuracy.    Medications ADDED:  none  Medications CHANGED:  none  Medications REMOVED:   Nicotine 14mg/24h patch    Please review updated prior to admission medication list and comments regarding how patient may be taking medications differently by going to Admission tab --> Admission Orders --> Admit Orders / Review prior to admission medications.     Preferred pharmacy, last doses of medications, and allergies to be confirmed with patient by nursing the day of procedure.     Sources used to complete the med history include:  OAS  Pharmacy dispense history  Spouse Good historian  Chart Review  Care Everywhere     Below are additional concerns with the patient's PTA list.  adela Davison [facility]  Please reach out via Secure Chat for questions   "

## 2025-06-21 ENCOUNTER — ANESTHESIA EVENT (OUTPATIENT)
Dept: OPERATING ROOM | Facility: HOSPITAL | Age: 65
End: 2025-06-21
Payer: COMMERCIAL

## 2025-06-21 NOTE — ANESTHESIA PREPROCEDURE EVALUATION
"Patient: Iam Acevedo \"Brandon\"    Procedure Information       Date/Time: 06/23/25 1300    Procedure: LOBECTOMY, LUNG, ROBOT-ASSISTED (Left: Chest) - Robotic assisted left lower lobe lobectomy with mediastinal lymphadenectomy; ROBOT approved by Mariia Ariza.    Location: [facility] OR 14 / [facility] OR    Surgeons: Edmundo Lambert MD            Relevant Problems   Cardiac   (+) CAD (coronary artery disease)   (+) Hypertension   (+) PAD (peripheral artery disease)      Pulmonary   (+) COPD (chronic obstructive pulmonary disease) (Multi)   (+) Malignant neoplasm of lower lobe of left lung (Multi)   (+) Non-small cell cancer of left lung (Multi)      Musculoskeletal   (+) Primary osteoarthritis of left wrist       Clinical information reviewed:     Meds               NPO Detail:  No data recorded     Physical Exam    Airway  Mallampati: III  TM distance: >3 FB  Neck ROM: full  Mouth opening: 3 or more finger widths     Cardiovascular - normal exam   Dental     (+) upper dentures, lower dentures     Pulmonary - normal exam   Abdominal          Anesthesia Plan    History of general anesthesia?: yes  History of complications of general anesthesia?: no    ASA 3     general     intravenous induction   Postoperative pain plan includes opioids.  Trial extubation is planned.  Anesthetic plan and risks discussed with patient.  Use of blood products discussed with patient who consented to blood products.    Plan discussed with attending.    "

## 2025-06-23 ENCOUNTER — HOSPITAL ENCOUNTER (INPATIENT)
Facility: HOSPITAL | Age: 65
LOS: 4 days | Discharge: HOME | DRG: 164 | End: 2025-06-27
Attending: THORACIC SURGERY (CARDIOTHORACIC VASCULAR SURGERY) | Admitting: THORACIC SURGERY (CARDIOTHORACIC VASCULAR SURGERY)
Payer: COMMERCIAL

## 2025-06-23 ENCOUNTER — APPOINTMENT (OUTPATIENT)
Dept: RADIOLOGY | Facility: HOSPITAL | Age: 65
DRG: 164 | End: 2025-06-23
Payer: COMMERCIAL

## 2025-06-23 ENCOUNTER — APPOINTMENT (OUTPATIENT)
Dept: CARDIOLOGY | Facility: HOSPITAL | Age: 65
DRG: 164 | End: 2025-06-23
Payer: COMMERCIAL

## 2025-06-23 ENCOUNTER — ANESTHESIA (OUTPATIENT)
Dept: OPERATING ROOM | Facility: HOSPITAL | Age: 65
End: 2025-06-23
Payer: COMMERCIAL

## 2025-06-23 DIAGNOSIS — C34.32 MALIGNANT NEOPLASM OF LOWER LOBE OF LEFT LUNG (MULTI): ICD-10-CM

## 2025-06-23 DIAGNOSIS — C34.92 NON-SMALL CELL CANCER OF LEFT LUNG (MULTI): Primary | ICD-10-CM

## 2025-06-23 LAB
ABO GROUP (TYPE) IN BLOOD: NORMAL
ANTIBODY SCREEN: NORMAL
RH FACTOR (ANTIGEN D): NORMAL

## 2025-06-23 PROCEDURE — 71045 X-RAY EXAM CHEST 1 VIEW: CPT

## 2025-06-23 PROCEDURE — 32674 THORACOSCOPY LYMPH NODE EXC: CPT | Performed by: THORACIC SURGERY (CARDIOTHORACIC VASCULAR SURGERY)

## 2025-06-23 PROCEDURE — 7100000001 HC RECOVERY ROOM TIME - INITIAL BASE CHARGE: Performed by: THORACIC SURGERY (CARDIOTHORACIC VASCULAR SURGERY)

## 2025-06-23 PROCEDURE — 36620 INSERTION CATHETER ARTERY: CPT

## 2025-06-23 PROCEDURE — 3600000017 HC OR TIME - EACH INCREMENTAL 1 MINUTE - PROCEDURE LEVEL SIX: Performed by: THORACIC SURGERY (CARDIOTHORACIC VASCULAR SURGERY)

## 2025-06-23 PROCEDURE — 3700000002 HC GENERAL ANESTHESIA TIME - EACH INCREMENTAL 1 MINUTE: Performed by: THORACIC SURGERY (CARDIOTHORACIC VASCULAR SURGERY)

## 2025-06-23 PROCEDURE — 2500000005 HC RX 250 GENERAL PHARMACY W/O HCPCS

## 2025-06-23 PROCEDURE — 7100000002 HC RECOVERY ROOM TIME - EACH INCREMENTAL 1 MINUTE: Performed by: THORACIC SURGERY (CARDIOTHORACIC VASCULAR SURGERY)

## 2025-06-23 PROCEDURE — 2500000001 HC RX 250 WO HCPCS SELF ADMINISTERED DRUGS (ALT 637 FOR MEDICARE OP): Performed by: PHYSICIAN ASSISTANT

## 2025-06-23 PROCEDURE — 8E0W4CZ ROBOTIC ASSISTED PROCEDURE OF TRUNK REGION, PERCUTANEOUS ENDOSCOPIC APPROACH: ICD-10-PCS | Performed by: THORACIC SURGERY (CARDIOTHORACIC VASCULAR SURGERY)

## 2025-06-23 PROCEDURE — 32674 THORACOSCOPY LYMPH NODE EXC: CPT | Performed by: PHYSICIAN ASSISTANT

## 2025-06-23 PROCEDURE — 88305 TISSUE EXAM BY PATHOLOGIST: CPT | Mod: TC,SUR,WESLAB | Performed by: THORACIC SURGERY (CARDIOTHORACIC VASCULAR SURGERY)

## 2025-06-23 PROCEDURE — 2500000005 HC RX 250 GENERAL PHARMACY W/O HCPCS: Performed by: PHYSICIAN ASSISTANT

## 2025-06-23 PROCEDURE — 93005 ELECTROCARDIOGRAM TRACING: CPT

## 2025-06-23 PROCEDURE — 2500000005 HC RX 250 GENERAL PHARMACY W/O HCPCS: Performed by: THORACIC SURGERY (CARDIOTHORACIC VASCULAR SURGERY)

## 2025-06-23 PROCEDURE — 1200000002 HC GENERAL ROOM WITH TELEMETRY DAILY

## 2025-06-23 PROCEDURE — 0BJ08ZZ INSPECTION OF TRACHEOBRONCHIAL TREE, VIA NATURAL OR ARTIFICIAL OPENING ENDOSCOPIC: ICD-10-PCS | Performed by: THORACIC SURGERY (CARDIOTHORACIC VASCULAR SURGERY)

## 2025-06-23 PROCEDURE — 2500000004 HC RX 250 GENERAL PHARMACY W/ HCPCS (ALT 636 FOR OP/ED): Performed by: PHYSICIAN ASSISTANT

## 2025-06-23 PROCEDURE — 32663 THORACOSCOPY W/LOBECTOMY: CPT | Performed by: THORACIC SURGERY (CARDIOTHORACIC VASCULAR SURGERY)

## 2025-06-23 PROCEDURE — 2500000004 HC RX 250 GENERAL PHARMACY W/ HCPCS (ALT 636 FOR OP/ED): Performed by: THORACIC SURGERY (CARDIOTHORACIC VASCULAR SURGERY)

## 2025-06-23 PROCEDURE — 3600000018 HC OR TIME - INITIAL BASE CHARGE - PROCEDURE LEVEL SIX: Performed by: THORACIC SURGERY (CARDIOTHORACIC VASCULAR SURGERY)

## 2025-06-23 PROCEDURE — 3700000001 HC GENERAL ANESTHESIA TIME - INITIAL BASE CHARGE: Performed by: THORACIC SURGERY (CARDIOTHORACIC VASCULAR SURGERY)

## 2025-06-23 PROCEDURE — 71045 X-RAY EXAM CHEST 1 VIEW: CPT | Performed by: RADIOLOGY

## 2025-06-23 PROCEDURE — 86901 BLOOD TYPING SEROLOGIC RH(D): CPT | Performed by: ANESTHESIOLOGY

## 2025-06-23 PROCEDURE — 07B74ZZ EXCISION OF THORAX LYMPHATIC, PERCUTANEOUS ENDOSCOPIC APPROACH: ICD-10-PCS | Performed by: THORACIC SURGERY (CARDIOTHORACIC VASCULAR SURGERY)

## 2025-06-23 PROCEDURE — P9045 ALBUMIN (HUMAN), 5%, 250 ML: HCPCS | Mod: JZ

## 2025-06-23 PROCEDURE — 36415 COLL VENOUS BLD VENIPUNCTURE: CPT | Performed by: ANESTHESIOLOGY

## 2025-06-23 PROCEDURE — 32663 THORACOSCOPY W/LOBECTOMY: CPT | Performed by: PHYSICIAN ASSISTANT

## 2025-06-23 PROCEDURE — 0BTJ4ZZ RESECTION OF LEFT LOWER LUNG LOBE, PERCUTANEOUS ENDOSCOPIC APPROACH: ICD-10-PCS | Performed by: THORACIC SURGERY (CARDIOTHORACIC VASCULAR SURGERY)

## 2025-06-23 PROCEDURE — A32663 PR THORACOSCOPY SURG LOBECTOMY: Performed by: STUDENT IN AN ORGANIZED HEALTH CARE EDUCATION/TRAINING PROGRAM

## 2025-06-23 PROCEDURE — 2500000004 HC RX 250 GENERAL PHARMACY W/ HCPCS (ALT 636 FOR OP/ED)

## 2025-06-23 PROCEDURE — 2720000007 HC OR 272 NO HCPCS: Performed by: THORACIC SURGERY (CARDIOTHORACIC VASCULAR SURGERY)

## 2025-06-23 RX ORDER — ASPIRIN 81 MG/1
81 TABLET ORAL DAILY
Status: DISCONTINUED | OUTPATIENT
Start: 2025-06-23 | End: 2025-06-27 | Stop reason: HOSPADM

## 2025-06-23 RX ORDER — HYDROMORPHONE HYDROCHLORIDE 0.2 MG/ML
0.2 INJECTION INTRAMUSCULAR; INTRAVENOUS; SUBCUTANEOUS EVERY 5 MIN PRN
Status: DISCONTINUED | OUTPATIENT
Start: 2025-06-23 | End: 2025-06-23 | Stop reason: HOSPADM

## 2025-06-23 RX ORDER — LABETALOL HYDROCHLORIDE 5 MG/ML
5 INJECTION, SOLUTION INTRAVENOUS ONCE AS NEEDED
Status: DISCONTINUED | OUTPATIENT
Start: 2025-06-23 | End: 2025-06-23 | Stop reason: HOSPADM

## 2025-06-23 RX ORDER — ATORVASTATIN CALCIUM 40 MG/1
40 TABLET, FILM COATED ORAL DAILY
Status: DISCONTINUED | OUTPATIENT
Start: 2025-06-23 | End: 2025-06-27 | Stop reason: HOSPADM

## 2025-06-23 RX ORDER — FENTANYL CITRATE 50 UG/ML
INJECTION, SOLUTION INTRAMUSCULAR; INTRAVENOUS AS NEEDED
Status: DISCONTINUED | OUTPATIENT
Start: 2025-06-23 | End: 2025-06-23

## 2025-06-23 RX ORDER — FERROUS SULFATE 325(65) MG
65 TABLET ORAL DAILY
Status: DISCONTINUED | OUTPATIENT
Start: 2025-06-23 | End: 2025-06-27 | Stop reason: HOSPADM

## 2025-06-23 RX ORDER — HYDROMORPHONE HYDROCHLORIDE 0.2 MG/ML
0.1 INJECTION INTRAMUSCULAR; INTRAVENOUS; SUBCUTANEOUS EVERY 5 MIN PRN
Status: DISCONTINUED | OUTPATIENT
Start: 2025-06-23 | End: 2025-06-23 | Stop reason: HOSPADM

## 2025-06-23 RX ORDER — METOPROLOL TARTRATE 25 MG/1
12.5 TABLET, FILM COATED ORAL EVERY 8 HOURS SCHEDULED
Status: DISCONTINUED | OUTPATIENT
Start: 2025-06-23 | End: 2025-06-24

## 2025-06-23 RX ORDER — CEFAZOLIN SODIUM 2 G/100ML
2 INJECTION, SOLUTION INTRAVENOUS EVERY 8 HOURS
Status: COMPLETED | OUTPATIENT
Start: 2025-06-23 | End: 2025-06-24

## 2025-06-23 RX ORDER — GABAPENTIN 100 MG/1
100 CAPSULE ORAL 3 TIMES DAILY
Status: DISCONTINUED | OUTPATIENT
Start: 2025-06-23 | End: 2025-06-27 | Stop reason: HOSPADM

## 2025-06-23 RX ORDER — ONDANSETRON HYDROCHLORIDE 2 MG/ML
INJECTION, SOLUTION INTRAVENOUS AS NEEDED
Status: DISCONTINUED | OUTPATIENT
Start: 2025-06-23 | End: 2025-06-23

## 2025-06-23 RX ORDER — ALBUMIN HUMAN 50 G/1000ML
SOLUTION INTRAVENOUS AS NEEDED
Status: DISCONTINUED | OUTPATIENT
Start: 2025-06-23 | End: 2025-06-23

## 2025-06-23 RX ORDER — HEPARIN SODIUM 5000 [USP'U]/ML
5000 INJECTION, SOLUTION INTRAVENOUS; SUBCUTANEOUS EVERY 8 HOURS
Status: DISCONTINUED | OUTPATIENT
Start: 2025-06-23 | End: 2025-06-27 | Stop reason: HOSPADM

## 2025-06-23 RX ORDER — ONDANSETRON HYDROCHLORIDE 2 MG/ML
4 INJECTION, SOLUTION INTRAVENOUS EVERY 8 HOURS PRN
Status: DISCONTINUED | OUTPATIENT
Start: 2025-06-23 | End: 2025-06-27 | Stop reason: HOSPADM

## 2025-06-23 RX ORDER — OXYCODONE HYDROCHLORIDE 5 MG/1
5 TABLET ORAL EVERY 4 HOURS PRN
Status: DISCONTINUED | OUTPATIENT
Start: 2025-06-23 | End: 2025-06-27 | Stop reason: HOSPADM

## 2025-06-23 RX ORDER — LIDOCAINE HYDROCHLORIDE 20 MG/ML
INJECTION, SOLUTION INFILTRATION; PERINEURAL AS NEEDED
Status: DISCONTINUED | OUTPATIENT
Start: 2025-06-23 | End: 2025-06-23

## 2025-06-23 RX ORDER — AMLODIPINE BESYLATE 10 MG/1
10 TABLET ORAL DAILY
Status: DISCONTINUED | OUTPATIENT
Start: 2025-06-23 | End: 2025-06-27 | Stop reason: HOSPADM

## 2025-06-23 RX ORDER — FLUTICASONE FUROATE AND VILANTEROL 200; 25 UG/1; UG/1
1 POWDER RESPIRATORY (INHALATION)
Status: DISCONTINUED | OUTPATIENT
Start: 2025-06-24 | End: 2025-06-27 | Stop reason: HOSPADM

## 2025-06-23 RX ORDER — AMOXICILLIN 250 MG
2 CAPSULE ORAL 2 TIMES DAILY
Status: DISCONTINUED | OUTPATIENT
Start: 2025-06-23 | End: 2025-06-27 | Stop reason: HOSPADM

## 2025-06-23 RX ORDER — HYDROMORPHONE HYDROCHLORIDE 1 MG/ML
INJECTION, SOLUTION INTRAMUSCULAR; INTRAVENOUS; SUBCUTANEOUS AS NEEDED
Status: DISCONTINUED | OUTPATIENT
Start: 2025-06-23 | End: 2025-06-23

## 2025-06-23 RX ORDER — OXYCODONE HYDROCHLORIDE 10 MG/1
10 TABLET ORAL EVERY 4 HOURS PRN
Status: DISCONTINUED | OUTPATIENT
Start: 2025-06-23 | End: 2025-06-27 | Stop reason: HOSPADM

## 2025-06-23 RX ORDER — CEFAZOLIN 1 G/1
INJECTION, POWDER, FOR SOLUTION INTRAVENOUS AS NEEDED
Status: DISCONTINUED | OUTPATIENT
Start: 2025-06-23 | End: 2025-06-23

## 2025-06-23 RX ORDER — METHOCARBAMOL 500 MG/1
500 TABLET, FILM COATED ORAL EVERY 8 HOURS PRN
Status: DISCONTINUED | OUTPATIENT
Start: 2025-06-23 | End: 2025-06-27 | Stop reason: HOSPADM

## 2025-06-23 RX ORDER — SODIUM CHLORIDE, SODIUM LACTATE, POTASSIUM CHLORIDE, CALCIUM CHLORIDE 600; 310; 30; 20 MG/100ML; MG/100ML; MG/100ML; MG/100ML
100 INJECTION, SOLUTION INTRAVENOUS CONTINUOUS
Status: DISCONTINUED | OUTPATIENT
Start: 2025-06-23 | End: 2025-06-23 | Stop reason: HOSPADM

## 2025-06-23 RX ORDER — ATORVASTATIN CALCIUM 40 MG/1
40 TABLET, FILM COATED ORAL DAILY
Status: DISCONTINUED | OUTPATIENT
Start: 2025-06-23 | End: 2025-06-23

## 2025-06-23 RX ORDER — PROPOFOL 10 MG/ML
INJECTION, EMULSION INTRAVENOUS AS NEEDED
Status: DISCONTINUED | OUTPATIENT
Start: 2025-06-23 | End: 2025-06-23

## 2025-06-23 RX ORDER — IPRATROPIUM BROMIDE AND ALBUTEROL SULFATE 2.5; .5 MG/3ML; MG/3ML
3 SOLUTION RESPIRATORY (INHALATION) EVERY 6 HOURS PRN
Status: DISCONTINUED | OUTPATIENT
Start: 2025-06-23 | End: 2025-06-27 | Stop reason: HOSPADM

## 2025-06-23 RX ORDER — SODIUM CHLORIDE 0.9 G/100ML
INJECTION, SOLUTION IRRIGATION AS NEEDED
Status: DISCONTINUED | OUTPATIENT
Start: 2025-06-23 | End: 2025-06-23 | Stop reason: HOSPADM

## 2025-06-23 RX ORDER — VASOPRESSIN 20 [USP'U]/ML
INJECTION, SOLUTION INTRAVENOUS AS NEEDED
Status: DISCONTINUED | OUTPATIENT
Start: 2025-06-23 | End: 2025-06-23

## 2025-06-23 RX ORDER — DEXMEDETOMIDINE HYDROCHLORIDE 4 UG/ML
INJECTION, SOLUTION INTRAVENOUS CONTINUOUS PRN
Status: DISCONTINUED | OUTPATIENT
Start: 2025-06-23 | End: 2025-06-23

## 2025-06-23 RX ORDER — HEPARIN SODIUM 5000 [USP'U]/ML
INJECTION, SOLUTION INTRAVENOUS; SUBCUTANEOUS AS NEEDED
Status: DISCONTINUED | OUTPATIENT
Start: 2025-06-23 | End: 2025-06-23

## 2025-06-23 RX ORDER — NALOXONE HYDROCHLORIDE 0.4 MG/ML
0.2 INJECTION, SOLUTION INTRAMUSCULAR; INTRAVENOUS; SUBCUTANEOUS EVERY 5 MIN PRN
Status: DISCONTINUED | OUTPATIENT
Start: 2025-06-23 | End: 2025-06-27 | Stop reason: HOSPADM

## 2025-06-23 RX ORDER — FERROUS SULFATE 325(65) MG
65 TABLET ORAL DAILY
Status: DISCONTINUED | OUTPATIENT
Start: 2025-06-23 | End: 2025-06-23

## 2025-06-23 RX ORDER — LIDOCAINE HYDROCHLORIDE 10 MG/ML
0.1 INJECTION, SOLUTION INFILTRATION; PERINEURAL ONCE
Status: DISCONTINUED | OUTPATIENT
Start: 2025-06-23 | End: 2025-06-23 | Stop reason: HOSPADM

## 2025-06-23 RX ORDER — PHENYLEPHRINE HCL IN 0.9% NACL 0.4MG/10ML
SYRINGE (ML) INTRAVENOUS AS NEEDED
Status: DISCONTINUED | OUTPATIENT
Start: 2025-06-23 | End: 2025-06-23

## 2025-06-23 RX ORDER — ROCURONIUM BROMIDE 10 MG/ML
INJECTION, SOLUTION INTRAVENOUS AS NEEDED
Status: DISCONTINUED | OUTPATIENT
Start: 2025-06-23 | End: 2025-06-23

## 2025-06-23 RX ORDER — ASPIRIN 81 MG/1
81 TABLET ORAL DAILY
Status: DISCONTINUED | OUTPATIENT
Start: 2025-06-23 | End: 2025-06-23

## 2025-06-23 RX ORDER — LIDOCAINE 560 MG/1
1 PATCH PERCUTANEOUS; TOPICAL; TRANSDERMAL DAILY
Status: DISCONTINUED | OUTPATIENT
Start: 2025-06-23 | End: 2025-06-27 | Stop reason: HOSPADM

## 2025-06-23 RX ORDER — ACETAMINOPHEN 325 MG/1
650 TABLET ORAL EVERY 6 HOURS
Status: DISCONTINUED | OUTPATIENT
Start: 2025-06-23 | End: 2025-06-27 | Stop reason: HOSPADM

## 2025-06-23 RX ORDER — BUPIVACAINE HCL/EPINEPHRINE 0.25-.0005
VIAL (ML) INJECTION AS NEEDED
Status: DISCONTINUED | OUTPATIENT
Start: 2025-06-23 | End: 2025-06-23 | Stop reason: HOSPADM

## 2025-06-23 RX ADMIN — Medication 2 L/MIN: at 18:39

## 2025-06-23 RX ADMIN — METOPROLOL TARTRATE 12.5 MG: 25 TABLET, FILM COATED ORAL at 23:41

## 2025-06-23 RX ADMIN — Medication 120 MCG: at 15:31

## 2025-06-23 RX ADMIN — OXYCODONE HYDROCHLORIDE 10 MG: 10 TABLET ORAL at 19:08

## 2025-06-23 RX ADMIN — ACETAMINOPHEN 650 MG: 325 TABLET, FILM COATED ORAL at 23:41

## 2025-06-23 RX ADMIN — ASPIRIN 81 MG: 81 TABLET, COATED ORAL at 20:19

## 2025-06-23 RX ADMIN — GABAPENTIN 100 MG: 100 CAPSULE ORAL at 20:20

## 2025-06-23 RX ADMIN — Medication 120 MCG: at 15:44

## 2025-06-23 RX ADMIN — HEPARIN SODIUM 5000 UNITS: 5000 INJECTION INTRAVENOUS; SUBCUTANEOUS at 15:01

## 2025-06-23 RX ADMIN — FENTANYL CITRATE 50 MCG: 50 INJECTION, SOLUTION INTRAMUSCULAR; INTRAVENOUS at 15:09

## 2025-06-23 RX ADMIN — DEXAMETHASONE SODIUM PHOSPHATE 4 MG: 4 INJECTION INTRA-ARTICULAR; INTRALESIONAL; INTRAMUSCULAR; INTRAVENOUS; SOFT TISSUE at 15:09

## 2025-06-23 RX ADMIN — CEFAZOLIN SODIUM 2 G: 2 INJECTION, SOLUTION INTRAVENOUS at 23:41

## 2025-06-23 RX ADMIN — LIDOCAINE 4% 1 PATCH: 40 PATCH TOPICAL at 20:19

## 2025-06-23 RX ADMIN — Medication 80 MCG: at 15:48

## 2025-06-23 RX ADMIN — HYDROMORPHONE HYDROCHLORIDE 0.5 MG: 1 INJECTION, SOLUTION INTRAMUSCULAR; INTRAVENOUS; SUBCUTANEOUS at 16:58

## 2025-06-23 RX ADMIN — SUGAMMADEX 400 MG: 100 INJECTION, SOLUTION INTRAVENOUS at 16:57

## 2025-06-23 RX ADMIN — HYDROMORPHONE HYDROCHLORIDE 0.2 MG: 0.5 INJECTION, SOLUTION INTRAMUSCULAR; INTRAVENOUS; SUBCUTANEOUS at 21:58

## 2025-06-23 RX ADMIN — HEPARIN SODIUM 5000 UNITS: 5000 INJECTION, SOLUTION INTRAVENOUS; SUBCUTANEOUS at 23:41

## 2025-06-23 RX ADMIN — ONDANSETRON 4 MG: 2 INJECTION, SOLUTION INTRAMUSCULAR; INTRAVENOUS at 16:53

## 2025-06-23 RX ADMIN — DOCUSATE SODIUM 50 MG AND SENNOSIDES 8.6 MG 2 TABLET: 8.6; 5 TABLET, FILM COATED ORAL at 20:19

## 2025-06-23 RX ADMIN — SODIUM CHLORIDE, SODIUM LACTATE, POTASSIUM CHLORIDE, AND CALCIUM CHLORIDE: 600; 310; 30; 20 INJECTION, SOLUTION INTRAVENOUS at 14:34

## 2025-06-23 RX ADMIN — HYDROMORPHONE HYDROCHLORIDE 0.3 MG: 1 INJECTION, SOLUTION INTRAMUSCULAR; INTRAVENOUS; SUBCUTANEOUS at 17:25

## 2025-06-23 RX ADMIN — ALBUMIN HUMAN 250 ML: 0.05 INJECTION, SOLUTION INTRAVENOUS at 15:41

## 2025-06-23 RX ADMIN — LIDOCAINE HYDROCHLORIDE 60 MG: 20 INJECTION, SOLUTION INFILTRATION; PERINEURAL at 14:42

## 2025-06-23 RX ADMIN — VASOPRESSIN 1 UNITS: 20 INJECTION, SOLUTION INTRAMUSCULAR; SUBCUTANEOUS at 15:59

## 2025-06-23 RX ADMIN — ATORVASTATIN CALCIUM 40 MG: 40 TABLET, FILM COATED ORAL at 20:20

## 2025-06-23 RX ADMIN — FENTANYL CITRATE 50 MCG: 50 INJECTION, SOLUTION INTRAMUSCULAR; INTRAVENOUS at 14:42

## 2025-06-23 RX ADMIN — Medication 80 MCG: at 16:05

## 2025-06-23 RX ADMIN — HYDROMORPHONE HYDROCHLORIDE 0.2 MG: 1 INJECTION, SOLUTION INTRAMUSCULAR; INTRAVENOUS; SUBCUTANEOUS at 17:30

## 2025-06-23 RX ADMIN — ROCURONIUM BROMIDE 20 MG: 10 INJECTION, SOLUTION INTRAVENOUS at 16:13

## 2025-06-23 RX ADMIN — Medication 2 L/MIN: at 17:45

## 2025-06-23 RX ADMIN — FERROUS SULFATE TAB 325 MG (65 MG ELEMENTAL FE) 1 TABLET: 325 (65 FE) TAB at 20:20

## 2025-06-23 RX ADMIN — DEXMEDETOMIDINE HYDROCHLORIDE 0.4 MCG/KG/HR: 4 INJECTION, SOLUTION INTRAVENOUS at 15:07

## 2025-06-23 RX ADMIN — ROCURONIUM BROMIDE 100 MG: 10 INJECTION, SOLUTION INTRAVENOUS at 14:43

## 2025-06-23 RX ADMIN — ROCURONIUM BROMIDE 10 MG: 10 INJECTION, SOLUTION INTRAVENOUS at 15:44

## 2025-06-23 RX ADMIN — Medication 80 MCG: at 16:17

## 2025-06-23 RX ADMIN — CEFAZOLIN 2 G: 1 INJECTION, POWDER, FOR SOLUTION INTRAMUSCULAR; INTRAVENOUS at 15:00

## 2025-06-23 RX ADMIN — PROPOFOL 200 MG: 10 INJECTION, EMULSION INTRAVENOUS at 14:42

## 2025-06-23 SDOH — ECONOMIC STABILITY: FOOD INSECURITY
WITHIN THE PAST 12 MONTHS, YOU WORRIED THAT YOUR FOOD WOULD RUN OUT BEFORE YOU GOT THE MONEY TO BUY MORE.: PATIENT DECLINED

## 2025-06-23 SDOH — SOCIAL STABILITY: SOCIAL INSECURITY: HAVE YOU HAD ANY THOUGHTS OF HARMING ANYONE ELSE?: NO

## 2025-06-23 SDOH — ECONOMIC STABILITY: HOUSING INSECURITY: AT ANY TIME IN THE PAST 12 MONTHS, WERE YOU HOMELESS OR LIVING IN A SHELTER (INCLUDING NOW)?: NO

## 2025-06-23 SDOH — ECONOMIC STABILITY: HOUSING INSECURITY: IN THE LAST 12 MONTHS, WAS THERE A TIME WHEN YOU WERE NOT ABLE TO PAY THE MORTGAGE OR RENT ON TIME?: NO

## 2025-06-23 SDOH — ECONOMIC STABILITY: INCOME INSECURITY: IN THE PAST 12 MONTHS HAS THE ELECTRIC, GAS, OIL, OR WATER COMPANY THREATENED TO SHUT OFF SERVICES IN YOUR HOME?: NO

## 2025-06-23 SDOH — ECONOMIC STABILITY: HOUSING INSECURITY: IN THE PAST 12 MONTHS, HOW MANY TIMES HAVE YOU MOVED WHERE YOU WERE LIVING?: 1

## 2025-06-23 SDOH — SOCIAL STABILITY: SOCIAL INSECURITY: WITHIN THE LAST YEAR, HAVE YOU BEEN HUMILIATED OR EMOTIONALLY ABUSED IN OTHER WAYS BY YOUR PARTNER OR EX-PARTNER?: NO

## 2025-06-23 SDOH — ECONOMIC STABILITY: FOOD INSECURITY: HOW HARD IS IT FOR YOU TO PAY FOR THE VERY BASICS LIKE FOOD, HOUSING, MEDICAL CARE, AND HEATING?: NOT HARD AT ALL

## 2025-06-23 SDOH — SOCIAL STABILITY: SOCIAL INSECURITY: WITHIN THE LAST YEAR, HAVE YOU BEEN AFRAID OF YOUR PARTNER OR EX-PARTNER?: NO

## 2025-06-23 SDOH — SOCIAL STABILITY: SOCIAL INSECURITY: DOES ANYONE TRY TO KEEP YOU FROM HAVING/CONTACTING OTHER FRIENDS OR DOING THINGS OUTSIDE YOUR HOME?: NO

## 2025-06-23 SDOH — ECONOMIC STABILITY: FOOD INSECURITY: WITHIN THE PAST 12 MONTHS, THE FOOD YOU BOUGHT JUST DIDN'T LAST AND YOU DIDN'T HAVE MONEY TO GET MORE.: PATIENT DECLINED

## 2025-06-23 SDOH — SOCIAL STABILITY: SOCIAL INSECURITY
ASK PARENT OR GUARDIAN: ARE THERE TIMES WHEN YOU, YOUR CHILD(REN), OR ANY MEMBER OF YOUR HOUSEHOLD FEEL UNSAFE, HARMED, OR THREATENED AROUND PERSONS WITH WHOM YOU KNOW OR LIVE?: NO

## 2025-06-23 SDOH — SOCIAL STABILITY: SOCIAL INSECURITY: HAS ANYONE EVER THREATENED TO HURT YOUR FAMILY OR YOUR PETS?: NO

## 2025-06-23 SDOH — SOCIAL STABILITY: SOCIAL INSECURITY: ARE YOU OR HAVE YOU BEEN THREATENED OR ABUSED PHYSICALLY, EMOTIONALLY, OR SEXUALLY BY ANYONE?: NO

## 2025-06-23 SDOH — SOCIAL STABILITY: SOCIAL INSECURITY: HAVE YOU HAD THOUGHTS OF HARMING ANYONE ELSE?: NO

## 2025-06-23 SDOH — SOCIAL STABILITY: SOCIAL INSECURITY: DO YOU FEEL UNSAFE GOING BACK TO THE PLACE WHERE YOU ARE LIVING?: NO

## 2025-06-23 SDOH — SOCIAL STABILITY: SOCIAL INSECURITY: DO YOU FEEL ANYONE HAS EXPLOITED OR TAKEN ADVANTAGE OF YOU FINANCIALLY OR OF YOUR PERSONAL PROPERTY?: NO

## 2025-06-23 SDOH — ECONOMIC STABILITY: TRANSPORTATION INSECURITY: IN THE PAST 12 MONTHS, HAS LACK OF TRANSPORTATION KEPT YOU FROM MEDICAL APPOINTMENTS OR FROM GETTING MEDICATIONS?: NO

## 2025-06-23 SDOH — SOCIAL STABILITY: SOCIAL INSECURITY: ABUSE: ADULT

## 2025-06-23 SDOH — SOCIAL STABILITY: SOCIAL INSECURITY: WERE YOU ABLE TO COMPLETE ALL THE BEHAVIORAL HEALTH SCREENINGS?: NO

## 2025-06-23 SDOH — SOCIAL STABILITY: SOCIAL INSECURITY: ARE THERE ANY APPARENT SIGNS OF INJURIES/BEHAVIORS THAT COULD BE RELATED TO ABUSE/NEGLECT?: NO

## 2025-06-23 ASSESSMENT — PAIN SCALES - GENERAL
PAINLEVEL_OUTOF10: 0 - NO PAIN
PAINLEVEL_OUTOF10: 2
PAINLEVEL_OUTOF10: 3
PAINLEVEL_OUTOF10: 2
PAINLEVEL_OUTOF10: 3
PAINLEVEL_OUTOF10: 5 - MODERATE PAIN
PAINLEVEL_OUTOF10: 9
PAINLEVEL_OUTOF10: 3
PAINLEVEL_OUTOF10: 9
PAINLEVEL_OUTOF10: 0 - NO PAIN

## 2025-06-23 ASSESSMENT — COGNITIVE AND FUNCTIONAL STATUS - GENERAL
PATIENT BASELINE BEDBOUND: NO
DAILY ACTIVITIY SCORE: 24
MOBILITY SCORE: 24
DAILY ACTIVITIY SCORE: 24
MOBILITY SCORE: 24

## 2025-06-23 ASSESSMENT — ACTIVITIES OF DAILY LIVING (ADL)
ADEQUATE_TO_COMPLETE_ADL: YES
TOILETING: INDEPENDENT
WALKS IN HOME: INDEPENDENT
HEARING - RIGHT EAR: FUNCTIONAL
PATIENT'S MEMORY ADEQUATE TO SAFELY COMPLETE DAILY ACTIVITIES?: YES
GROOMING: INDEPENDENT
BATHING: INDEPENDENT
JUDGMENT_ADEQUATE_SAFELY_COMPLETE_DAILY_ACTIVITIES: YES
LACK_OF_TRANSPORTATION: NO
HEARING - LEFT EAR: FUNCTIONAL
FEEDING YOURSELF: INDEPENDENT
DRESSING YOURSELF: INDEPENDENT
LACK_OF_TRANSPORTATION: NO

## 2025-06-23 ASSESSMENT — PAIN DESCRIPTION - DESCRIPTORS
DESCRIPTORS: ACHING;SORE
DESCRIPTORS: ACHING

## 2025-06-23 ASSESSMENT — PAIN - FUNCTIONAL ASSESSMENT
PAIN_FUNCTIONAL_ASSESSMENT: 0-10

## 2025-06-23 ASSESSMENT — PAIN DESCRIPTION - LOCATION
LOCATION: INCISION
LOCATION: RIB CAGE

## 2025-06-23 ASSESSMENT — PATIENT HEALTH QUESTIONNAIRE - PHQ9
2. FEELING DOWN, DEPRESSED OR HOPELESS: NOT AT ALL
1. LITTLE INTEREST OR PLEASURE IN DOING THINGS: NOT AT ALL
SUM OF ALL RESPONSES TO PHQ9 QUESTIONS 1 & 2: 0

## 2025-06-23 ASSESSMENT — LIFESTYLE VARIABLES
HOW MANY STANDARD DRINKS CONTAINING ALCOHOL DO YOU HAVE ON A TYPICAL DAY: PATIENT DOES NOT DRINK
HOW OFTEN DO YOU HAVE 6 OR MORE DRINKS ON ONE OCCASION: NEVER
AUDIT-C TOTAL SCORE: 0
AUDIT-C TOTAL SCORE: 0
HOW OFTEN DO YOU HAVE A DRINK CONTAINING ALCOHOL: NEVER
SKIP TO QUESTIONS 9-10: 1

## 2025-06-23 ASSESSMENT — PAIN DESCRIPTION - ORIENTATION
ORIENTATION: LEFT
ORIENTATION: LEFT

## 2025-06-23 ASSESSMENT — COLUMBIA-SUICIDE SEVERITY RATING SCALE - C-SSRS
1. IN THE PAST MONTH, HAVE YOU WISHED YOU WERE DEAD OR WISHED YOU COULD GO TO SLEEP AND NOT WAKE UP?: NO
2. HAVE YOU ACTUALLY HAD ANY THOUGHTS OF KILLING YOURSELF?: NO
6. HAVE YOU EVER DONE ANYTHING, STARTED TO DO ANYTHING, OR PREPARED TO DO ANYTHING TO END YOUR LIFE?: NO

## 2025-06-23 NOTE — ANESTHESIA POSTPROCEDURE EVALUATION
"Patient: Iam Acevedo \"Brandon\"    Procedure Summary       Date: 06/23/25 Room / Location: [facility] OR 14 / [facility] OR    Anesthesia Start: 1434 Anesthesia Stop: 1736    Procedure: LOBECTOMY, LUNG, ROBOT-ASSISTED (Left: Chest) Diagnosis:       Malignant neoplasm of lower lobe of left lung (Multi)      (Malignant neoplasm of lower lobe of left lung (Multi) [C34.32])    Surgeons: Edmundo Lambert MD Responsible Provider: Roberto Ramirez MD    Anesthesia Type: general ASA Status: 3            Anesthesia Type: No value filed.    Vitals Value Taken Time   /84 06/23/25 17:33   Temp 36 °C (96.8 °F) 06/23/25 17:33   Pulse 88 06/23/25 17:36   Resp 19 06/23/25 17:36   SpO2 95 % 06/23/25 17:36   Vitals shown include unfiled device data.    Anesthesia Post Evaluation    Patient location during evaluation: PACU  Patient participation: complete - patient participated  Level of consciousness: sleepy but conscious  Pain management: adequate  Airway patency: patent  Cardiovascular status: acceptable  Respiratory status: acceptable  Hydration status: acceptable  Postoperative Nausea and Vomiting: none        No notable events documented.    "

## 2025-06-23 NOTE — H&P
"History of Present Illness:  Iam Acevedo \"Ignacio" is a 65 y.o. male on day 0 of admission presenting with Malignant neoplasm of lower lobe of left lung (Multi). Patient has a 3 x 2.6 cm left lower lobe nodule that was biopsy-proven adenocarcinoma.  Patient underwent staging that shows a negative MRI brain, PFTs have been completed under adequate, and PET scan shows a max SUV of 8.9.  Patient presenting here for elective left lower lobectomy with mediastinal lymph node dissection via a minimally invasive approach with Dr. Young.    Past Medical History  He has a past medical history of Anemia, Atherosclerosis of native arteries of extremities with intermittent claudication, left leg (03/21/2022), COPD (chronic obstructive pulmonary disease) (Multi), Coronary artery disease, Hyperlipidemia, Hypertension, Malignant neoplasm of lower lobe of left lung (Multi), Myocardial infarction (Multi), Other acute postprocedural pain (02/08/2021), PAD (peripheral artery disease), Personal history of other diseases of the digestive system (03/11/2021), Solitary pulmonary nodule (04/30/2015), and Unspecified abdominal hernia without obstruction or gangrene.    Surgical History  He has a past surgical history that includes Hernia repair; Multiple tooth extractions (Bilateral, 10/21/2024); Vascular surgery (03/28/2025); CT lung mediastinum biopsy (05/01/2025); and Cardiac catheterization.     Medications:  Current Outpatient Medications   Medication Instructions    albuterol 90 mcg/actuation inhaler 1 puff, inhalation, Every 4 hours PRN    amLODIPine (NORVASC) 10 mg, oral, Daily    apixaban (ELIQUIS) 5 mg, oral, 2 times daily    aspirin 81 mg, Daily    atorvastatin (LIPITOR) 40 mg, oral, Daily    b complex 0.4 mg tablet 1 tablet, Daily    ferrous sulfate 65 mg, Daily with breakfast    fluticasone propion-salmeteroL (Advair Diskus) 250-50 mcg/dose diskus inhaler 1 puff, inhalation, 2 times daily RT    lisinopriL-hydrochlorothiazide " "20-12.5 mg tablet 1 tablet, oral, Daily    multivitamin with minerals (multivitamin-iron-folic acid) tablet 1 tablet, Daily    nicotine (Nicoderm CQ) 14 mg/24 hr patch 1 patch, transdermal, Every 24 hours        Allergies  Patient has no known allergies.    Family History  Family History[1]    Social History  He reports that he quit smoking about 50 years ago. His smoking use included cigarettes. He started smoking about 4 months ago. He has never used smokeless tobacco. He reports current alcohol use of about 24.0 standard drinks of alcohol per week. He reports that he does not use drugs.      ROS: the remainder of the 12 point ROS was negative except as stated in the HPI    Last Recorded Vitals  There were no vitals taken for this visit.    Physical Exam  Gen maria a: well-appearing male, NAD  Head: NC/AT  Eyes: EOMI, anicteric sclera  Neck: trachea midline  Chest: equal chest rise bilaterally  Abdomen: soft, non-distended nt  : voiding independently  MSK: REINALDO  Psych: appropriate mood and affect     Assessment:  Iam Acevedo \"Ignacio" is a 65 y.o. male on day 0 of admission presenting with Malignant neoplasm of lower lobe of left lung (Multi).  Patient presenting for left lower lobectomy for 3 cm adenocarcinoma of the left lower lobe.    Plan:  - Patient undergo elective surgery today with admission to thoracic surgery thereafter.  - Please page with any questions or concerns.    Patient was seen with attending, Dr. Young.    Willa Rocha MD, MSc  Thoracic Surgery  Pager [phone]         [1]   Family History  Problem Relation Name Age of Onset    Hypertension Mother      Hypertension Father      Kidney disease Father      Heart attack Maternal Grandmother       "

## 2025-06-23 NOTE — ANESTHESIA PROCEDURE NOTES
Airway  Date/Time: 6/23/2025 2:45 PM  Reason: elective    Airway not difficult    Staffing  Performed: resident   Authorized by: Roberto Ramirez MD    Performed by: Amita Rosen MD  Patient location during procedure: OR    Patient Condition  Indications for airway management: anesthesia  Patient position: sniffing  Planned trial extubation  Sedation level: deep     Final Airway Details   Preoxygenated: yes  Final airway type: endotracheal airway  Successful airway: ETT - double lumen left  Cuffed: yes   Successful intubation technique: direct laryngoscopy  Adjuncts used in placement: intubating stylet  Endotracheal tube insertion site: oral  Blade: Lilia  Blade size: #4  ETT DL size (fr): 37  Cormack-Lehane Classification: grade I - full view of glottis  Placement verified by: chest auscultation and capnometry   Measured from: lips  Number of attempts at approach: 1

## 2025-06-23 NOTE — ANESTHESIA PROCEDURE NOTES
Arterial Line:    Date/Time: 6/23/2025 2:50 PM    Staffing  Performed: resident   Authorized by: Roberto Ramirez MD    Performed by: Amita Rosen MD    An arterial line was placed. Procedure performed using surface landmarks.in the OR for the following indication(s): continuous blood pressure monitoring and blood sampling needed.    A 20 gauge (size), 1 and 3/4 inch (length), Angiocath (type) catheter was placed into the Right radial artery, secured by Tegaderm,   Seldinger technique used.  Events:  patient tolerated procedure well with no complications.

## 2025-06-23 NOTE — BRIEF OP NOTE
"Date: 2025  OR Location: [facility] OR    Name: Iam Acevedo \"Brandon\", : 1960, Age: 65 y.o., MRN: [id number], Sex: male    Diagnosis  Pre-op Diagnosis      * Malignant neoplasm of lower lobe of left lung (Multi) [C34.32] Post-op Diagnosis     * Malignant neoplasm of lower lobe of left lung (Multi) [C34.32]     Procedures  Bronchoscopy, left robotic assisted lower lobectomy, mediastinal lymph node dissection, and intercostal nerve blocks    Surgeons      * Edmundo Lambert - Primary    Resident/Fellow/Other Assistant:  Surgeons and Role:     * Willa Rocha MD - Resident - Assisting     * JEFF Griffith-C - DAMARIS First Assist    Staff:   Circulator: Tati  Scrub Person: Georgie  Circulator: Zaria  Relief Scrub: Latesha Sanderson Circulator: Georgie    Anesthesia Staff: Anesthesiologist: Roberto Ramirez MD  C-AA: CIRO Knott  Anesthesia Resident: Amita Rosen MD    Procedure Summary  Anesthesia: Anesthesia type not filed in the log.  ASA: ASA status not filed in the log.  Estimated Blood Loss: 50mL  Intra-op Medications:   Administrations occurring from 1300 to 1635 on 25:   Medication Name Total Dose   sodium chloride 0.9 % irrigation solution 1,000 mL   albumin human bottle 5% 250 mL   ceFAZolin (Ancef) vial 1 g 2 g   dexAMETHasone (Decadron) 4 mg/mL 4 mg   dexmedeTOMIDine 4 mcg/mL in 100 mL NS infusion 9.46 mcg   fentaNYL (Sublimaze) injection 50 mcg/mL 50 mcg   heparin 5,000 units/mL 5,000 Units   LR bolus Cannot be calculated   lidocaine (Xylocaine) injection 2 % 60 mg   phenylephrine 40 mcg/mL syringe 10 mL 480 mcg   propofol (Diprivan) injection 10 mg/mL 200 mg   rocuronium (ZeMuron) 50 mg/5 mL injection 130 mg   vasopressin 20 units/mL 1 Units            Anesthesia Record               Intraprocedure I/O Totals          Intake    Dexmedetomidine 0.00 mL    The total shown is the total volume documented since Anesthesia Start was filed.    Total Intake 0 mL       " Output    Urine 185 mL    Total Output 185 mL       Net    Net Volume -185 mL          Specimen:   ID Type Source Tests Collected by Time   1 : LEVEL 8 LYMPH NODE Tissue LYMPH NODE PULMONARY (SPECIFY SITE) SURGICAL PATHOLOGY EXAM Edmundo Lambert MD 6/23/2025 1522   2 : LEVEL 9 LYMPH NODE Tissue LYMPH NODE PULMONARY (SPECIFY SITE) SURGICAL PATHOLOGY EXAM Edmundo Lambert MD 6/23/2025 1525   3 : LEVEL 7 LYMPH NODE Tissue LYMPH NODE PULMONARY (SPECIFY SITE) SURGICAL PATHOLOGY EXAM Edmundo Lambert MD 6/23/2025 1533   4 : LEVEL 5 LYMPH NODE Tissue LYMPH NODE PULMONARY (SPECIFY SITE) SURGICAL PATHOLOGY EXAM Edmundo Lambert MD 6/23/2025 1543   5 : LEVEL 10 LYMPH NODE Tissue LYMPH NODE PULMONARY (SPECIFY SITE) SURGICAL PATHOLOGY EXAM Edmundo Lambert MD 6/23/2025 1547   6 : LEVEL 11 LYMPH NODE Tissue LYMPH NODE PULMONARY (SPECIFY SITE) SURGICAL PATHOLOGY EXAM Edmundo Lambert MD 6/23/2025 1615   7 : LEVEL 11 LYMPH NODE #2 Tissue LYMPH NODE PULMONARY (SPECIFY SITE) SURGICAL PATHOLOGY EXAM Edmundo Lambert MD 6/23/2025 1635   8 : LEFT LOWER LOBE Tissue LUNG LOBECTOMY/SEGMENTECTOMY LEFT (SPECIFY SITE) SURGICAL PATHOLOGY EXAM Edmundo Lambert MD 6/23/2025 1642      Findings: See the formal operative note for additional details.  Complications:  None; patient tolerated the procedure well.     Disposition: PACU - hemodynamically stable.  Condition: stable    I was the bedside assistant in the left robotic assisted lower lobectomy and mediastinal lymph node dissection with Dr. Young.    Deisi Moore PA-C

## 2025-06-23 NOTE — ANESTHESIA PROCEDURE NOTES
Peripheral IV  Date/Time: 6/23/2025 2:43 PM      Placement  Needle size: 18 G  Laterality: left  Location: hand  Site prep: alcohol  Technique: anatomical landmarks  Attempts: 1

## 2025-06-24 ENCOUNTER — APPOINTMENT (OUTPATIENT)
Dept: RADIOLOGY | Facility: HOSPITAL | Age: 65
DRG: 164 | End: 2025-06-24
Payer: COMMERCIAL

## 2025-06-24 PROCEDURE — 71045 X-RAY EXAM CHEST 1 VIEW: CPT

## 2025-06-24 PROCEDURE — 2500000005 HC RX 250 GENERAL PHARMACY W/O HCPCS: Performed by: PHYSICIAN ASSISTANT

## 2025-06-24 PROCEDURE — 2500000001 HC RX 250 WO HCPCS SELF ADMINISTERED DRUGS (ALT 637 FOR MEDICARE OP): Performed by: PHYSICIAN ASSISTANT

## 2025-06-24 PROCEDURE — 71045 X-RAY EXAM CHEST 1 VIEW: CPT | Performed by: RADIOLOGY

## 2025-06-24 PROCEDURE — 2500000004 HC RX 250 GENERAL PHARMACY W/ HCPCS (ALT 636 FOR OP/ED): Performed by: PHYSICIAN ASSISTANT

## 2025-06-24 PROCEDURE — 97165 OT EVAL LOW COMPLEX 30 MIN: CPT | Mod: GO

## 2025-06-24 PROCEDURE — 99232 SBSQ HOSP IP/OBS MODERATE 35: CPT | Performed by: PHYSICIAN ASSISTANT

## 2025-06-24 PROCEDURE — 97161 PT EVAL LOW COMPLEX 20 MIN: CPT | Mod: GP | Performed by: STUDENT IN AN ORGANIZED HEALTH CARE EDUCATION/TRAINING PROGRAM

## 2025-06-24 PROCEDURE — 97530 THERAPEUTIC ACTIVITIES: CPT | Mod: GP | Performed by: STUDENT IN AN ORGANIZED HEALTH CARE EDUCATION/TRAINING PROGRAM

## 2025-06-24 PROCEDURE — 1200000002 HC GENERAL ROOM WITH TELEMETRY DAILY

## 2025-06-24 RX ORDER — HYDROCHLOROTHIAZIDE 25 MG/1
12.5 TABLET ORAL DAILY
Status: DISCONTINUED | OUTPATIENT
Start: 2025-06-24 | End: 2025-06-27 | Stop reason: HOSPADM

## 2025-06-24 RX ADMIN — OXYCODONE HYDROCHLORIDE 10 MG: 10 TABLET ORAL at 03:59

## 2025-06-24 RX ADMIN — FERROUS SULFATE TAB 325 MG (65 MG ELEMENTAL FE) 1 TABLET: 325 (65 FE) TAB at 20:26

## 2025-06-24 RX ADMIN — DOCUSATE SODIUM 50 MG AND SENNOSIDES 8.6 MG 2 TABLET: 8.6; 5 TABLET, FILM COATED ORAL at 08:19

## 2025-06-24 RX ADMIN — HEPARIN SODIUM 5000 UNITS: 5000 INJECTION, SOLUTION INTRAVENOUS; SUBCUTANEOUS at 08:19

## 2025-06-24 RX ADMIN — ATORVASTATIN CALCIUM 40 MG: 40 TABLET, FILM COATED ORAL at 20:26

## 2025-06-24 RX ADMIN — ACETAMINOPHEN 650 MG: 325 TABLET, FILM COATED ORAL at 06:06

## 2025-06-24 RX ADMIN — ACETAMINOPHEN 650 MG: 325 TABLET, FILM COATED ORAL at 12:11

## 2025-06-24 RX ADMIN — GABAPENTIN 100 MG: 100 CAPSULE ORAL at 14:46

## 2025-06-24 RX ADMIN — METOPROLOL TARTRATE 12.5 MG: 25 TABLET, FILM COATED ORAL at 08:54

## 2025-06-24 RX ADMIN — LIDOCAINE 4% 1 PATCH: 40 PATCH TOPICAL at 08:20

## 2025-06-24 RX ADMIN — AMLODIPINE BESYLATE 10 MG: 10 TABLET ORAL at 09:38

## 2025-06-24 RX ADMIN — OXYCODONE 5 MG: 5 TABLET ORAL at 08:23

## 2025-06-24 RX ADMIN — CEFAZOLIN SODIUM 2 G: 2 INJECTION, SOLUTION INTRAVENOUS at 08:19

## 2025-06-24 RX ADMIN — ASPIRIN 81 MG: 81 TABLET, COATED ORAL at 20:26

## 2025-06-24 RX ADMIN — OXYCODONE 5 MG: 5 TABLET ORAL at 20:25

## 2025-06-24 RX ADMIN — HYDROCHLOROTHIAZIDE 12.5 MG: 25 TABLET ORAL at 09:38

## 2025-06-24 RX ADMIN — DOCUSATE SODIUM 50 MG AND SENNOSIDES 8.6 MG 2 TABLET: 8.6; 5 TABLET, FILM COATED ORAL at 20:25

## 2025-06-24 RX ADMIN — ACETAMINOPHEN 650 MG: 325 TABLET, FILM COATED ORAL at 17:25

## 2025-06-24 RX ADMIN — GABAPENTIN 100 MG: 100 CAPSULE ORAL at 08:19

## 2025-06-24 RX ADMIN — HEPARIN SODIUM 5000 UNITS: 5000 INJECTION, SOLUTION INTRAVENOUS; SUBCUTANEOUS at 14:46

## 2025-06-24 RX ADMIN — GABAPENTIN 100 MG: 100 CAPSULE ORAL at 20:26

## 2025-06-24 ASSESSMENT — COGNITIVE AND FUNCTIONAL STATUS - GENERAL
DAILY ACTIVITIY SCORE: 24
MOBILITY SCORE: 18
CLIMB 3 TO 5 STEPS WITH RAILING: A LITTLE
WALKING IN HOSPITAL ROOM: A LITTLE
MOBILITY SCORE: 24
DAILY ACTIVITIY SCORE: 24
STANDING UP FROM CHAIR USING ARMS: A LITTLE
MOVING TO AND FROM BED TO CHAIR: A LITTLE
TURNING FROM BACK TO SIDE WHILE IN FLAT BAD: A LITTLE
MOVING FROM LYING ON BACK TO SITTING ON SIDE OF FLAT BED WITH BEDRAILS: A LITTLE

## 2025-06-24 ASSESSMENT — PAIN SCALES - GENERAL
PAINLEVEL_OUTOF10: 0 - NO PAIN
PAINLEVEL_OUTOF10: 8
PAINLEVEL_OUTOF10: 3
PAINLEVEL_OUTOF10: 1
PAINLEVEL_OUTOF10: 4
PAINLEVEL_OUTOF10: 0 - NO PAIN

## 2025-06-24 ASSESSMENT — PAIN DESCRIPTION - ORIENTATION
ORIENTATION: LEFT
ORIENTATION: LEFT

## 2025-06-24 ASSESSMENT — PAIN DESCRIPTION - LOCATION
LOCATION: CHEST
LOCATION: CHEST

## 2025-06-24 ASSESSMENT — PAIN - FUNCTIONAL ASSESSMENT
PAIN_FUNCTIONAL_ASSESSMENT: 0-10

## 2025-06-24 ASSESSMENT — PAIN DESCRIPTION - DESCRIPTORS: DESCRIPTORS: ACHING;DISCOMFORT

## 2025-06-24 ASSESSMENT — ACTIVITIES OF DAILY LIVING (ADL)
ADL_ASSISTANCE: INDEPENDENT
BATHING_ASSISTANCE: INDEPENDENT

## 2025-06-24 NOTE — CARE PLAN
The patient's goals for the shift include      The clinical goals for the shift include Pt will report adequate pain control this shift      Problem: Pain - Adult  Goal: Verbalizes/displays adequate comfort level or baseline comfort level  Outcome: Progressing     Problem: Safety - Adult  Goal: Free from fall injury  Outcome: Progressing     Problem: Discharge Planning  Goal: Discharge to home or other facility with appropriate resources  Outcome: Progressing     Problem: Chronic Conditions and Co-morbidities  Goal: Patient's chronic conditions and co-morbidity symptoms are monitored and maintained or improved  Outcome: Progressing     Problem: Nutrition  Goal: Nutrient intake appropriate for maintaining nutritional needs  Outcome: Progressing     Problem: Pain  Goal: Takes deep breaths with improved pain control throughout the shift  Outcome: Progressing  Goal: Turns in bed with improved pain control throughout the shift  Outcome: Progressing  Goal: Walks with improved pain control throughout the shift  Outcome: Progressing  Goal: Performs ADL's with improved pain control throughout shift  Outcome: Progressing  Goal: Participates in PT with improved pain control throughout the shift  Outcome: Progressing  Goal: Free from opioid side effects throughout the shift  Outcome: Progressing  Goal: Free from acute confusion related to pain meds throughout the shift  Outcome: Progressing     Problem: Fall/Injury  Goal: Not fall by end of shift  Outcome: Progressing  Goal: Be free from injury by end of the shift  Outcome: Progressing  Goal: Verbalize understanding of personal risk factors for fall in the hospital  Outcome: Progressing  Goal: Verbalize understanding of risk factor reduction measures to prevent injury from fall in the home  Outcome: Progressing  Goal: Use assistive devices by end of the shift  Outcome: Progressing  Goal: Pace activities to prevent fatigue by end of the shift  Outcome: Progressing

## 2025-06-24 NOTE — OP NOTE
"LOBECTOMY, LUNG, ROBOT-ASSISTED (L) Operative Note     Date: 2025  OR Location: [facility] OR    Name: Iam Acevedo \"Brandon\", : 1960, Age: 65 y.o., MRN: [id number], Sex: male    Diagnosis  Pre-op Diagnosis      * Malignant neoplasm of lower lobe of left lung (Multi) [C34.32] Post-op Diagnosis     * Malignant neoplasm of lower lobe of left lung (Multi) [C34.32]     Procedures  LOBECTOMY, LUNG, ROBOT-ASSISTED  69782 - FL THORACOSCOPY W/LOBECTOMY SINGLE LOBE  Flexible bronchoscopy  Robotic assisted left lower lobe lobectomy  Robotic assisted mediastinal lymphadenectomy  Multilevel rib blocks    Surgeons      * Edmundo Lambert - Primary    Resident/Fellow/Other Assistant:  Surgeons and Role:     * Willa Rocha MD - Resident - Assisting     * JEFF Griffith-C - DAMARIS First Assist    Staff:   Circulator: Tati  Scrub Person: Georgie  Circulator: Zaria Sanderson Scrub: Latesha Sanderson Circulator: Georgie    Anesthesia Staff: Anesthesiologist: Roberto Ramirez MD  C-AA: CIRO Knott  Anesthesia Resident: Amita Rosen MD    Procedure Summary  Anesthesia: Anesthesia type not filed in the log.  ASA: ASA status not filed in the log.  Estimated Blood Loss: 50 mL  Intra-op Medications:   Administrations occurring from 1300 to 1635 on 25:   Medication Name Total Dose   sodium chloride 0.9 % irrigation solution 1,000 mL   albumin human bottle 5% 250 mL   ceFAZolin (Ancef) vial 1 g 2 g   dexAMETHasone (Decadron) 4 mg/mL 4 mg   dexmedeTOMIDine 4 mcg/mL in 100 mL NS infusion 9.46 mcg   fentaNYL (Sublimaze) injection 50 mcg/mL 100 mcg   heparin 5,000 units/mL 5,000 Units   LR bolus Cannot be calculated   lidocaine (Xylocaine) injection 2 % 60 mg   phenylephrine 40 mcg/mL syringe 10 mL 480 mcg   propofol (Diprivan) injection 10 mg/mL 200 mg   rocuronium (ZeMuron) 50 mg/5 mL injection 130 mg   vasopressin 20 units/mL 1 Units              Anesthesia Record               Intraprocedure I/O " Totals          Intake    Dexmedetomidine 0.00 mL    The total shown is the total volume documented since Anesthesia Start was filed.    Total Intake 0 mL       Output    Urine 185 mL    Total Output 185 mL       Net    Net Volume -185 mL          Specimen:   ID Type Source Tests Collected by Time   1 : LEVEL 8 LYMPH NODE Tissue LYMPH NODE PULMONARY (SPECIFY SITE) SURGICAL PATHOLOGY EXAM Edmundo Lambert MD 6/23/2025 1522   2 : LEVEL 9 LYMPH NODE Tissue LYMPH NODE PULMONARY (SPECIFY SITE) SURGICAL PATHOLOGY EXAM Edmundo Lambert MD 6/23/2025 1525   3 : LEVEL 7 LYMPH NODE Tissue LYMPH NODE PULMONARY (SPECIFY SITE) SURGICAL PATHOLOGY EXAM Edmundo Lambert MD 6/23/2025 1533   4 : LEVEL 5 LYMPH NODE Tissue LYMPH NODE PULMONARY (SPECIFY SITE) SURGICAL PATHOLOGY EXAM Edmundo Lambert MD 6/23/2025 1543   5 : LEVEL 10 LYMPH NODE Tissue LYMPH NODE PULMONARY (SPECIFY SITE) SURGICAL PATHOLOGY EXAM Edmundo Lambert MD 6/23/2025 1547   6 : LEVEL 11 LYMPH NODE Tissue LYMPH NODE PULMONARY (SPECIFY SITE) SURGICAL PATHOLOGY EXAM Edmundo Lambert MD 6/23/2025 1615   7 : LEVEL 11 LYMPH NODE #2 Tissue LYMPH NODE PULMONARY (SPECIFY SITE) SURGICAL PATHOLOGY EXAM Edmundo Lambert MD 6/23/2025 1635   8 : LEFT LOWER LOBE Tissue LUNG LOBECTOMY/SEGMENTECTOMY LEFT (SPECIFY SITE) SURGICAL PATHOLOGY EXAM Edmundo Lambert MD 6/23/2025 1642                 Drains and/or Catheters:   Chest Tube 1 Left Pleural 28 Fr (Active)   Present on Admission to Healthcare Facility Y 06/23/25 1840   Function To water seal 06/23/25 1840   Chest Tube Air Leak Yes 06/23/25 1840   Patency Intervention Tip/tilt 06/23/25 1840   Drainage Description Serosanguineous 06/23/25 1840   Dressing Changed Reinforced 06/23/25 1840   Dressing Status Clean;Dry 06/23/25 1840   Site Assessment Clean;Dry;Intact 06/23/25 1840   Surrounding Skin Intact;Dry 06/23/25 1840       Urethral Catheter Non-latex (Active)   Site  "Assessment Skin intact;Clean 06/23/25 1840   Collection Container Standard drainage bag 06/23/25 1840   Securement Method Securing device (Describe) 06/23/25 1840   Reason for Continuing Urinary Catheterization surgical procedures: urological/gynecological, pelvic oncology, anal, prolonged surgical procedure 06/23/25 1840   Output (mL) 35 mL 06/23/25 1800       Tourniquet Times:         Implants:     Findings: Adequate left lower lobe lobectomy.      Indications: Iam Acevedo \"Ignacio" is an 65 y.o. male who is having surgery for Malignant neoplasm of lower lobe of left lung (Multi) [C34.32].  Biopsy-proven adenocarcinoma here for resection.    The patient was seen in the preoperative area. The risks, benefits, complications, treatment options, non-operative alternatives, expected recovery and outcomes were discussed with the patient. The possibilities of reaction to medication, pulmonary aspiration, injury to surrounding structures, bleeding, recurrent infection, the need for additional procedures, failure to diagnose a condition, and creating a complication requiring transfusion or operation were discussed with the patient. The patient concurred with the proposed plan, giving informed consent.  The site of surgery was properly noted/marked if necessary per policy. The patient has been actively warmed in preoperative area. Preoperative antibiotics have been ordered and given within 1 hours of incision. Venous thrombosis prophylaxis have been ordered including bilateral sequential compression devices and chemical prophylaxis    Procedure Details: After identifying the patient in the preoperative area, the patient was brought to the room.  Patient placed in the supine position.  Timeout performed.  General anesthesia induced with a double-lumen tube.  Flexible bronchoscopy performed to assess position of endotracheal tube and also to assess the airway.  No endobronchial lesions were identified.  Patient placed in " the right lateral decubitus with the left side up.  All bony prominences were padded.  Chest was prepped and draped in surgical fashion.  A 1 cm incision was performed in the eighth intercostal space with midaxillary line.  After gaining access to the chest cavity, multilevel rib blocks with Marcaine were done under direct visualization.  1 to 2 cc of quarter percent Marcaine were injected into each interspace from spaces 3-8.  Additional ports were placed in the same interspace 1 anterior and 2 posterior.  An assist port was placed between arms 2 and 3.  Robot was docked. I proceeded to scrub out and moved to the console.  Attention was turned to the inferior pulmonary ligament which was dissected and Station 8 and 9 lymph nodes were harvested and sent for permanent section.  The dissection was taken posteriorly to the level of the hilum and also the subcarinal space lymph nodes were harvested and sent for permanent section.  Pulmonary artery was identified.  Posterior and anterior fissures were developed with the help of black loads of the robotic stapler.  Superior segmental branch branch was identified as well as the basilar segmental branches.  These were  circumferentially dissected and transected with white loads of the robotic stapler.  Attention was turned to the vein where the inferior vein was circumferentially dissected and transected with a white load of the robotic stapler.  Finally the bronchus was transected with a black load of the robotic stapler.  Station 5 lymph nodes were harvested and sent for permanent section.  Specimen was removed in an Endo Catch bag.  Hemostasis was achieved at all times.  28 Iceland[place] chest tube placed.  Robot undocked.  Lung insufflated under direct visualization.  All wounds were closed in layers.  Patient tolerated procedure well and transferred to the postanesthesia care unit in adequate conditions.  All counts were correct.  No qualified resident or fellow to bedside  assist in this robotic case so Deisi Moore acted as a first assistant during this robotic case.  Evidence of Infection: No   Complications:  None; patient tolerated the procedure well.    Disposition: PACU - hemodynamically stable.  Condition: stable             Task Performed by DAMARIS First Assist or Physician Assistant:   Deisi Moore PA/NP, was necessary to assist on this case due to the nature of the case and difficulty. During the case she served as my assist by helping with port placement, robotic docking, robotic instrument exchange, and wound closure.        Attending Attestation: I was present and scrubbed for the entire procedure.    Edmundo Lambert  Phone Number: [phone]

## 2025-06-24 NOTE — PROGRESS NOTES
06/24/25 0853   Discharge Planning   Living Arrangements Spouse/significant other   Support Systems Spouse/significant other   Assistance Needed independent   Type of Residence [place] residence  ([place]   Number of Stairs to Enter Residence 2   Do you have animals or pets at home? Yes   Type of Animals or Pets 2 dogs, 5 cats. 3 birds   Who is requesting discharge planning? Provider   Home or Post Acute Services None   Expected Discharge Disposition Home   Does the patient need discharge transport arranged? No     Met with patient and introduced myself as Care Coordinator and member of the discharge planning team.  Patient is s/p L lower lobectomy. He plans to return home at time of discharge with his wife. He was independent in ADL's prior to hospitalization. No device used. His PCP is Dr. Mccarty. He uses [facility]. No home care needs were identified at this time. Care Coordinator will continue to follow for home going needs.

## 2025-06-24 NOTE — PROGRESS NOTES
"Thoracic Surgery Progress Note  6/24/2025    Iam Acevedo is a 65 y.o. male with a history of HTN, COPD, CAD, PAD, and a lung mass. Now 1 Day Post-Op status post robotic left lower lobectomy.    Overnight issues: admitted to [place] [id number] from the PACU    Physical Exam:  General: He is a pleasant male currently in no distress.  Visit Vitals  /73 (BP Location: Left arm, Patient Position: Lying)   Pulse 72   Temp 37 °C (98.6 °F) (Temporal)   Resp 18   Ht 1.651 m (5' 5\")   Wt 67.8 kg (149 lb 8 oz)   SpO2 94%   BMI 24.88 kg/m²   Smoking Status Former   BSA 1.76 m²     Body mass index is 24.88 kg/m².   HEENT: Normocephalic and atraumatic.   NECK: Supple. Trach midline. No JVD.   CHEST: Breathing comfortably on room air;  Lungs CTA R, diminished in L base;  Chest tube with 160ml drainage, +AL  HEART: Regular rate and rhythm. NSR with rate in 70s per tele review.   ABDOMEN: Soft, ND, nontender.   NEUROLOGIC: Alert and oriented. Grossly intact.   EXTREMITIES: Moves all extremities equally. No lower extremity edema. No calf tenderness.     Diagnostics:     Intake/Output Summary (Last 24 hours) at 6/24/2025 0851  Last data filed at 6/24/2025 0451  Gross per 24 hour   Intake 360 ml   Output 655 ml   Net -295 ml               No lab exists for component: \"LABGLOM\"      No lab exists for component: \"LABGLOM\"  Scheduled medications  Scheduled Medications[1]  Continuous medications  Continuous Medications[2]  PRN medications  PRN Medications[3]    Imaging:   AM CXR reviewed. Expected post op changes and chest tube placement. No clinically significant pneumothorax. Remains stable in comparison with previous exam.    Assessment:  Iam Acevedo is a 65 y.o. male with a history of HTN, COPD, CAD, PAD, and a lung mass. Now 1 Day Post-Op status post robotic left lower lobectomy.    Plan:  Neurology: post operative pain  -cont PO analgesics  -Scheduled Motrin if kidney function permits  -Bowel regimen available for " constipation secondary to pain medication   -Out of bed to the chair throughout the day  -Encourage ambulation as tolerated    Cardiovascular: h/o HTN, CAD, PAD  -Continue telemetry  -cont Q4h VS  -stop metoprolol for post operative arrhythmia prophylaxis   -resume home regimen of hydrochlorothiazide, Norvasc  -cont home regimen of ASA, atorvastatin  -cont to hold Lisinopril and Eliquis in immediate post-op setting  -Replace electrolytes as needed for K >4, Mg >2    Pulmonology: post op atelectasis, chest tube management, COPD  -Encourage incentive spirometer use every hour  -Continue pulmonary hygiene  -Wean oxygen as tolerated   -Maintain chest tube to WS  -Obtain daily CXR  -Monitor and record chest tube drainage every shift  -cont Breo/Ellipta in place of home Advair    Gastrointestinal:   -regular diet as tolerated  -Zofran available for nausea  -scheduled colace, PRN bowel regimen    Genitourinary:   -Continue to monitor daily electrolytes with routine BMPs   -Adequate urine output    Infectious Disease: afebrile  -Continue to trend daily temperatures to monitor for signs of post-operative infection   -Monitor surgical incisions for signs of infection   -Perioperative antibiotics completed     Hematology/DVT Prophylaxis:  PAD - on eliquis  -Continue subcutaneous heparin and SCDs, early ambulation  -Monitor for signs of acute blood loss  -Trend CBCs   -cont to hold eliquis in immediate post-op setting     Disposition:  -Plan for discharge to home once stable from a surgical standpoint.  -Continue to assess for home-going needs     Patient seen and examined by this provider.     NEVA LazoC  Thoracic Surgery, #[id number]       [1] acetaminophen, 650 mg, oral, q6h  [Held by provider] amLODIPine, 10 mg, oral, Daily  aspirin, 81 mg, oral, Daily  atorvastatin, 40 mg, oral, Daily  ferrous sulfate, 65 mg of elemental iron, oral, Daily  fluticasone furoate-vilanteroL, 1 puff, inhalation, Daily  gabapentin, 100 mg,  oral, TID  heparin (porcine), 5,000 Units, subcutaneous, q8h  lidocaine, 1 patch, transdermal, Daily  metoprolol tartrate, 12.5 mg, oral, q8h GAGE  sennosides-docusate sodium, 2 tablet, oral, BID  [2]    [3] PRN medications: HYDROmorphone, ipratropium-albuteroL, methocarbamol, naloxone, ondansetron, oxyCODONE, oxyCODONE, oxygen

## 2025-06-24 NOTE — PROGRESS NOTES
"Physical Therapy    Physical Therapy Evaluation    Patient Name: Iam Acevedo \"Brandon\"  MRN: [id number]  Room: [facility]3068-A  Today's Date: 6/24/2025   Time Calculation  Start Time: 0902  Stop Time: 0929  Time Calculation (min): 27 min    Assessment/Plan   PT Assessment  Rehab Prognosis: Good  Barriers to Discharge Home: No anticipated barriers  End of Session Communication: Bedside nurse  End of Session Patient Position: Up in chair, Alarm off, not on at start of session  IP OR SWING BED PT PLAN  Inpatient or Swing Bed: Inpatient  PT Plan: PT Eval only  PT Eval Only Reason: At baseline function  PT Frequency: PT eval only  PT Discharge Recommendations: No further acute PT  PT Recommended Transfer Status: Contact guard    Subjective      General Visit Information:  Subjective: pt alert and agreeable to PT. Patient is in good spirits to this date. Reported feeling much better pain wise compared to last night.  Reason for Referral: 1 Day Post-Op status post robotic left lower lobectomy.  Past Medical History Relevant to Rehab: history of HTN, COPD, CAD, PAD, and a lung mass  Prior to Session Communication: Bedside nurse  Patient Position Received: Up in chair, Alarm off, not on at start of session  Family/Caregiver Present: No   Home Living:  Home Living  Type of Home: House  Lives With: Spouse  Home Adaptive Equipment: None  Home Layout: One level  Home Access: Stairs to enter with rails  Entrance Stairs-Number of Steps: 2  Prior Level of Function:  Prior Function Per Pt/Caregiver Report  Level of [place]: Independent with ADLs and functional transfers, Independent with homemaking with ambulation  ADL Assistance: Independent  Homemaking Assistance: Independent  Ambulatory Assistance: Independent  Vocational: Full time employment  Prior Function Comments: wood worker,   Precautions:  Precautions  Hearing/Visual Limitations: WFL  Medical Precautions: Chest tube, Fall precautions  Vital " Signs:  Pre Vitals  Vital Signs  Heart Rate: 75  BP: 118/73  Vital Signs Comment: Pt vitals stable throuhgout session   Post Vitals  Vital Signs  Heart Rate: 69  BP: 118/73  Vital Signs Comment: Pt vitals stable throuhgout session   Lines/Tubes/Drains:  Chest Tube 1 Left Pleural 28 Fr (Active)   Number of days:         Continuous Medications/Drips:  Continuous Medications[1]    Objective   Pain:  Pain Assessment  Pain Assessment: 0-10  0-10 (Numeric) Pain Score: 0 - No pain (Post-0)  Pain Type: Surgical pain  Pain Location: Chest  Pain Orientation: Left    Cognition:  Cognition  Orientation Level: Oriented X4    General Assessments:  Extremity/Trunk Assessments:  Tone: No abnormalities noted  Sensation  Light Touch: No apparent deficits  Coordination  Movements are Fluid and Coordinated: Yes  Upper Extremity  ROM: PROM WFL  Strength:WFL  Lower Extremity  ROM: WFL  Strength: WFL   Sitting Static Balance Normal  Sitting Dynamic Balance Normal  Standing Static Balance Normal  Standing Dynamic Balance Normal    Functional Assessments:  Bed Mobility  Bed Mobility: No    Transfers  Transfer: Yes  Transfer 1  Transfer From 1: Sit to  Transfer to 1: Stand  Transfer Level of Assistance 1: Close supervision  Transfers 2  Transfer From 2: Stand to  Transfer to 2: Sit  Transfer Level of Assistance 2: Close supervision  Transfers 3  Transfer From 3: Chair with arms to  Transfer to 3: Chair with arms  Transfer Level of Assistance 3: Close supervision    Ambulation/Gait Training  Ambulation/Gait Training Performed: Yes  Ambulation/Gait Training 1  Surface 1: Level tile  Device 1: No device  Assistance 1: Close supervision  Quality of Gait 1: Narrow base of support, Decreased step length, Forward flexed posture (slow cadance)  Comments/Distance (ft) 1: 250 ft, 20 ft    Stairs  Stairs: No         Outcome Measures:  [facility] Basic Mobility  Turning from your back to your side while in a flat bed without using bedrails: A little  Moving  from lying on your back to sitting on the side of a flat bed without using bedrails: A little  Moving to and from bed to chair (including a wheelchair): A little  Standing up from a chair using your arms (e.g. wheelchair or bedside chair): A little  To walk in hospital room: A little  Climbing 3-5 steps with railing: A little  Basic Mobility - Total Score: 18                 Tinetti  Sitting Balance: Steady, safe  Arises: Able, uses arms to help  Attempts to Arise: Able to arise, one attempt  Immediate Standing Balance (First 5 Seconds): Steady without walker or other support  Standing Balance: Narrow stance without support  Nudged: Steady without walker or other support  Eyes Closed: Steady  Turned 360 Degrees: Steadiness: Steady  Turned 360 Degrees: Continuity of Steps: Discontinuous steps  Sitting Down: Uses arms or not a smooth motion  Balance Score: 13  Initiation of Gait: No hesitancy  Step Height: R Swing Foot: Right foot complete clears floor  Step Length: R Swing Foot: Passes left stance foot  Step Height: L Swing Foot: Left foot complete clears floor  Step Length: L Swing Foot: Passes right stance foot  Step Symmetry: Right and left step appear equal  Step Continuity: Steps appear continuous  Path: Mild/moderate deviation or uses walking aid  Trunk: No sway but flexion of knees or back or spreads arms out while walking  Walking Time: Heels almost touching while walking  Gait Score: 10  Total Score: 23  Timed Up and Go Test  How many seconds did it take to complete the 5 tasks?: 20 seconds  TUG Comment: 2 trials performed       Encounter Problems       Encounter Problems (Active)       Pain - Adult          Therapeutic Treatment:  L GH flexion x10   Incentive Spirometer x10 <=1000    Assessment: Pt is a 65 y.o. male that is S/P L lower lobectomy. Patient is close supervision in all functional capacities with stable vitals. L GH flexion limited due to pain, passive ROM WNL. Based on gait pattern, balance,  and tinetti, patient scores as a low falls risk. No further acute PT warranted at this time.  Please continue mobility during acute stay with nursing staff.  PT to sign off with no further needs.      Education Documentation  Body Mechanics, taught by ADONAY Torre at 6/24/2025 10:06 AM.  Learner: Patient  Readiness: Acceptance  Method: Explanation  Response: Verbalizes Understanding  Comment: POC Review    Precautions, taught by ADONAY Torre at 6/24/2025 10:06 AM.  Learner: Patient  Readiness: Acceptance  Method: Explanation  Response: Verbalizes Understanding  Comment: POC Review    Mobility Training, taught by ADONAY Torre at 6/24/2025 10:06 AM.  Learner: Patient  Readiness: Acceptance  Method: Explanation  Response: Verbalizes Understanding  Comment: POC Review    Education Comments  No comments found.          06/24/25 at 10:07 AM   ADONAY TORRE   Rehab Office: [phone]                 [1]

## 2025-06-24 NOTE — PROGRESS NOTES
"Occupational Therapy    Evaluation    Patient Name: Iam Acevedo \"Brandon\"  MRN: [id number]  Department: [facility] [address]  Room: [address] [place] [address]  Today's Date: 6/24/2025  Time Calculation  Start Time: 1041  Stop Time: 1055  Time Calculation (min): 14 min    Assessment  IP OT Assessment  OT Assessment: NN  Prognosis: Excellent  Barriers to Discharge Home: No anticipated barriers  Evaluation/Treatment Tolerance: Patient tolerated treatment well  Medical Staff Made Aware: Yes  End of Session Communication: Bedside nurse  End of Session Patient Position: Up in chair, Alarm off, not on at start of session  Plan:  No Skilled OT: No acute OT goals identified  OT Frequency: OT eval only  OT Discharge Recommendations: No OT needed after discharge, No further acute OT  Equipment Recommended upon Discharge:  (n/a)  OT Recommended Transfer Status: Independent  OT - OK to Discharge: Yes    Subjective   Current Problem:  1. Non-small cell cancer of left lung (Multi)        2. Malignant neoplasm of lower lobe of left lung (Multi)  Surgical Pathology Exam    Surgical Pathology Exam        OT Visit Info:  OT Received On: 06/24/25  General Visit Info:  General  Reason for Referral: status post robotic left lower lobectomy.  Past Medical History Relevant to Rehab: Anemia, Atherosclerosis of native arteries of extremities with intermittent claudication, left leg (03/21/2022), COPD (chronic obstructive pulmonary disease) (Multi), Coronary artery disease, Hyperlipidemia, Hypertension, Malignant neoplasm of lower lobe of left lung (Multi), Myocardial infarction (Multi), Other acute postprocedural pain (02/08/2021), PAD (peripheral artery disease), Personal history of other diseases of the digestive system (03/11/2021), Solitary pulmonary nodule (04/30/2015), and Unspecified abdominal hernia without obstruction or gangrene.  Family/Caregiver Present: No  Prior to Session Communication: Bedside nurse  Patient Position Received: Up in chair, Alarm off, " not on at start of session  Precautions:  Medical Precautions: Fall precautions, Chest tube     Date/Time Vitals Session Patient Position Pulse Resp SpO2 BP MAP (mmHg)    06/24/25 1041 During OT  --  69  --  --  --  --     06/24/25 1200 --  --  67  19  98 %  86/53  64     06/24/25 1209 --  --  70  19  --  104/66  79                Pain:  Pain Assessment  Pain Assessment: 0-10  0-10 (Numeric) Pain Score: 0 - No pain    Objective   Cognition:  Overall Cognitive Status: Within Functional Limits  Orientation Level: Oriented X4  Impulsive: Within functional limits           Home Living:  Type of Home: House  Lives With: Spouse  Home Adaptive Equipment:  (WHW, cane, wheelchair, reachers)  Home Layout: One level  Home Access:  (2 BRIA HR)  Bathroom Shower/Tub: Tub/shower unit  Bathroom Toilet: Standard  Bathroom Equipment:  (shower seat)   Prior Function:  Level of [place]: Independent with ADLs and functional transfers, Independent with homemaking with ambulation  Vocational: Full time employment ()  IADL History:  IADL Comments: I I/ADLS, + pets, +drive  ADL:  Grooming Assistance: Independent  Bathing Assistance: Independent  UE Dressing Assistance: Independent  LE Dressing Assistance: Independent  Toileting Assistance with Device: Independent  Activity Tolerance:  Endurance: Endurance does not limit participation in activity  Bed Mobility/Transfers:      Transfers  Transfer:  (sit/stand S)      Functional Mobility:  Functional Mobility  Functional Mobility Performed:  (pt performed fxnl mob to/from chair x1 lap floor I)  Sitting Balance:  Dynamic Sitting Balance  Dynamic Sitting-Level of Assistance: Independent  Standing Balance:  Dynamic Standing Balance  Dynamic Standing-Level of Assistance: Independent    IADL's:   IADL Comments: I I/ADLS, + pets, +drive  Vision: Vision - Basic Assessment  Current Vision: Wears glasses only for reading  Sensation:  Light Touch: No apparent  deficits  Strength:  Strength Comments: BUE WFL  Perception:     Coordination:  Movements are Fluid and Coordinated: Yes   Hand Function:  Hand Function  Gross Grasp: Functional  Extremities: RUE   RUE : Within Functional Limits and LUE   LUE: Within Functional Limits      Outcome Measures: [facility] Daily Activity  Putting on and taking off regular lower body clothing: None  Bathing (including washing, rinsing, drying): None  Putting on and taking off regular upper body clothing: None  Toileting, which includes using toilet, bedpan or urinal: None  Taking care of personal grooming such as brushing teeth: None  Eating Meals: None  Daily Activity - Total Score: 24         and OT Adult Other Outcome Measures  4AT: -  Education Documentation  Body Mechanics, taught by Veronica Mathew OT at 6/24/2025 12:13 PM.  Learner: Patient  Readiness: Acceptance  Method: Explanation, Demonstration  Response: Verbalizes Understanding, Demonstrated Understanding  Comment: barry bui ADLs    Precautions, taught by Veronica Mathew OT at 6/24/2025 12:13 PM.  Learner: Patient  Readiness: Acceptance  Method: Explanation, Demonstration  Response: Verbalizes Understanding, Demonstrated Understanding  Comment: barry WEBERs    ADL Training, taught by Veronica Mathew OT at 6/24/2025 12:13 PM.  Learner: Patient  Readiness: Acceptance  Method: Explanation, Demonstration  Response: Verbalizes Understanding, Demonstrated Understanding  Comment: barry bui ADLs    Education Comments  No comments found.

## 2025-06-25 ENCOUNTER — APPOINTMENT (OUTPATIENT)
Dept: RADIOLOGY | Facility: HOSPITAL | Age: 65
DRG: 164 | End: 2025-06-25
Payer: COMMERCIAL

## 2025-06-25 PROCEDURE — 2500000005 HC RX 250 GENERAL PHARMACY W/O HCPCS: Performed by: PHYSICIAN ASSISTANT

## 2025-06-25 PROCEDURE — 71045 X-RAY EXAM CHEST 1 VIEW: CPT | Performed by: RADIOLOGY

## 2025-06-25 PROCEDURE — 2500000004 HC RX 250 GENERAL PHARMACY W/ HCPCS (ALT 636 FOR OP/ED): Performed by: PHYSICIAN ASSISTANT

## 2025-06-25 PROCEDURE — 99232 SBSQ HOSP IP/OBS MODERATE 35: CPT | Performed by: PHYSICIAN ASSISTANT

## 2025-06-25 PROCEDURE — 94640 AIRWAY INHALATION TREATMENT: CPT

## 2025-06-25 PROCEDURE — 2500000001 HC RX 250 WO HCPCS SELF ADMINISTERED DRUGS (ALT 637 FOR MEDICARE OP): Performed by: PHYSICIAN ASSISTANT

## 2025-06-25 PROCEDURE — 1200000002 HC GENERAL ROOM WITH TELEMETRY DAILY

## 2025-06-25 PROCEDURE — 71045 X-RAY EXAM CHEST 1 VIEW: CPT

## 2025-06-25 RX ADMIN — ATORVASTATIN CALCIUM 40 MG: 40 TABLET, FILM COATED ORAL at 20:24

## 2025-06-25 RX ADMIN — OXYCODONE HYDROCHLORIDE 10 MG: 10 TABLET ORAL at 20:23

## 2025-06-25 RX ADMIN — ASPIRIN 81 MG: 81 TABLET, COATED ORAL at 20:23

## 2025-06-25 RX ADMIN — HYDROCHLOROTHIAZIDE 12.5 MG: 25 TABLET ORAL at 08:57

## 2025-06-25 RX ADMIN — ACETAMINOPHEN 650 MG: 325 TABLET, FILM COATED ORAL at 12:27

## 2025-06-25 RX ADMIN — HEPARIN SODIUM 5000 UNITS: 5000 INJECTION, SOLUTION INTRAVENOUS; SUBCUTANEOUS at 17:28

## 2025-06-25 RX ADMIN — GABAPENTIN 100 MG: 100 CAPSULE ORAL at 08:57

## 2025-06-25 RX ADMIN — OXYCODONE HYDROCHLORIDE 10 MG: 10 TABLET ORAL at 12:26

## 2025-06-25 RX ADMIN — ACETAMINOPHEN 650 MG: 325 TABLET, FILM COATED ORAL at 05:29

## 2025-06-25 RX ADMIN — AMLODIPINE BESYLATE 10 MG: 10 TABLET ORAL at 08:57

## 2025-06-25 RX ADMIN — ACETAMINOPHEN 650 MG: 325 TABLET, FILM COATED ORAL at 00:19

## 2025-06-25 RX ADMIN — GABAPENTIN 100 MG: 100 CAPSULE ORAL at 20:23

## 2025-06-25 RX ADMIN — HEPARIN SODIUM 5000 UNITS: 5000 INJECTION, SOLUTION INTRAVENOUS; SUBCUTANEOUS at 00:18

## 2025-06-25 RX ADMIN — GABAPENTIN 100 MG: 100 CAPSULE ORAL at 15:28

## 2025-06-25 RX ADMIN — DOCUSATE SODIUM 50 MG AND SENNOSIDES 8.6 MG 2 TABLET: 8.6; 5 TABLET, FILM COATED ORAL at 20:22

## 2025-06-25 RX ADMIN — ACETAMINOPHEN 650 MG: 325 TABLET, FILM COATED ORAL at 20:23

## 2025-06-25 RX ADMIN — DOCUSATE SODIUM 50 MG AND SENNOSIDES 8.6 MG 2 TABLET: 8.6; 5 TABLET, FILM COATED ORAL at 08:57

## 2025-06-25 RX ADMIN — HEPARIN SODIUM 5000 UNITS: 5000 INJECTION, SOLUTION INTRAVENOUS; SUBCUTANEOUS at 08:57

## 2025-06-25 RX ADMIN — FERROUS SULFATE TAB 325 MG (65 MG ELEMENTAL FE) 1 TABLET: 325 (65 FE) TAB at 20:24

## 2025-06-25 RX ADMIN — FLUTICASONE FUROATE AND VILANTEROL TRIFENATATE 1 PUFF: 200; 25 POWDER RESPIRATORY (INHALATION) at 10:16

## 2025-06-25 RX ADMIN — LIDOCAINE 4% 1 PATCH: 40 PATCH TOPICAL at 08:58

## 2025-06-25 ASSESSMENT — PAIN - FUNCTIONAL ASSESSMENT
PAIN_FUNCTIONAL_ASSESSMENT: 0-10
PAIN_FUNCTIONAL_ASSESSMENT: 0-10
PAIN_FUNCTIONAL_ASSESSMENT: UNABLE TO SELF-REPORT
PAIN_FUNCTIONAL_ASSESSMENT: 0-10

## 2025-06-25 ASSESSMENT — PAIN DESCRIPTION - DESCRIPTORS
DESCRIPTORS: ACHING;DISCOMFORT

## 2025-06-25 ASSESSMENT — PAIN DESCRIPTION - LOCATION
LOCATION: CHEST

## 2025-06-25 ASSESSMENT — PAIN SCALES - GENERAL
PAINLEVEL_OUTOF10: 4
PAINLEVEL_OUTOF10: 7
PAINLEVEL_OUTOF10: 7
PAINLEVEL_OUTOF10: 3
PAINLEVEL_OUTOF10: 3

## 2025-06-25 ASSESSMENT — PAIN DESCRIPTION - ORIENTATION
ORIENTATION: LEFT

## 2025-06-25 NOTE — PROGRESS NOTES
"Thoracic Surgery Progress Note  6/25/2025    Iam Acevedo is a 65 y.o. male with a history of HTN, COPD, CAD, PAD s/p balloon angioplasty of the R SFA/pop on 6/3/24 + left iliofemdart with patch angioplasty in March of 2025, and a LLL adenocarcinoma. Pt is now s/p a bronchoscopy, left robotic assisted lower lobectomy, mediastinal lymph node dissection, and multilevel rib blocks with Dr. Young on 6/23/25.     Overnight issues: Pt with no concerns this morning. Denies fevers, chills, sweating, shortness of breath, nausea, or vomiting. Voiding spontaneously s/p surgery. Reports using his incentive spirometer and ambulating 3+ times a day.     Physical Exam:  Visit Vitals  /78 (BP Location: Left arm, Patient Position: Lying)   Pulse 81   Temp 36.4 °C (97.5 °F) (Temporal)   Resp 19   Ht 1.651 m (5' 5\")   Wt 69.2 kg (152 lb 8 oz)   SpO2 94%   BMI 25.38 kg/m²   Smoking Status Former   BSA 1.78 m²     GENERAL: He is a pleasant male currently in no distress.  HEENT: Normocephalic and atraumatic.   NECK: Supple. Trach midline. No JVD.   CHEST: Breathing comfortably on room air,  Lungs CTA, left chest tube with 250ml of serosanguinous drainage since surgery, +AL  HEART: Regular rate and rhythm.  ABDOMEN: Soft, ND, nontender abdomen. No rebound or guarding.  NEUROLOGIC: Alert and oriented. Grossly intact.   EXTREMITIES: Moves all extremities equally. No lower extremity edema. No calf tenderness.     Diagnostics:     Intake/Output Summary (Last 24 hours) at 6/25/2025 1144  Last data filed at 6/25/2025 1100  Gross per 24 hour   Intake 921 ml   Output 1041 ml   Net -120 ml     Scheduled medications  Scheduled Medications[1]  Continuous medications  Continuous Medications[2]  PRN medications  PRN Medications[3]    XR chest 1 view  Result Date: 6/24/2025  Interpreted By:  Sahara Arora, STUDY: XR CHEST 1 VIEW; 6/24/2025 5:35 am   INDICATION: Signs/Symptoms:Daily CXR on morning rounds, upright please.   " COMPARISON: Radiograph dated 06/23/2025   ACCESSION NUMBER(S): [id number]   ORDERING CLINICIAN: DICK AGUILA   FINDINGS: Postsurgical changes in the left lung with left apical chest tube in place.   The cardiac silhouette size is within normal limits.   Small left apical pneumothorax is again seen. Interval increase in bibasilar atelectasis. No shahzad edema.   Small amount of subcutaneous air in the left chest wall.   No acute osseous change.       1. Postsurgical changes in the left lung with trace left apical pneumothorax. 2. Mild bibasilar atelectasis, increased compared to prior study.       Signed by: Sahara Villa 6/24/2025 9:38 AM Dictation workstation:   [id number]    XR chest 1 view  Result Date: 6/24/2025  Interpreted By:  Sahara Arora,  and Carlos Hall STUDY: XR CHEST 1 VIEW;  6/23/2025 5:43 pm   INDICATION: Signs/Symptoms:s/p surgery, upright please. Status post thoracostomy, left lower lobectomy, mediastinal lymph node dissection.     COMPARISON: XR CHEST 1 VIEW 5/1/2025, XR CHEST 1 VIEW 5/1/2025, XR CHEST 1 VIEW 5/1/2025, CT ANGIO CHEST W AND WO IV CONTRAST 3/24/2025, CHEST 1 VIEW 8/1/2016, CHEST 2 VIEW PA AND LAT 3/16/2015   ACCESSION NUMBER(S): [id number]   ORDERING CLINICIAN: DICK AGUILA   FINDINGS: AP radiograph of the chest was provided.   Large-bore chest tube projects over the left hemithorax with the tip terminating over the superior mediastinum. New from prior.   CARDIOMEDIASTINAL SILHOUETTE: Cardiomediastinal silhouette is normal in size and configuration.   LUNGS: Interval resolution of a left hilar mass. There has been interval left lower lobectomy with associated architectural changes. Coarse interstitial markings which are nonspecific but can be seen in setting of COPD. Mild streak like bibasilar opacities, suggesting atelectasis. Trace left apical pneumothorax no new focal consolidation.   ABDOMEN: Subcutaneous emphysema extends in the soft tissues  along the left lateral chest.   BONES: No acute osseous changes.       1.  Interval left lower lobectomy with resection of the lower lobe mass and placement of a large-bore left-sided chest tube. Trace left apical pneumothorax. There is a small amount of subcutaneous emphysema along the left lateral chest wall. 2. Mild bibasilar atelectasis.   I personally reviewed the images/study and I agree with the findings as stated by Isabel Gonzalez MD (resident).   MACRO: None   Signed by: Sahara Villa 6/24/2025 9:37 AM Dictation workstation:   [id number]     CXR on 6/25: AM CXR reviewed by this provider. Expected post op changes and chest tube placement. No clinically significant pneumothorax. Remains stable in comparison with previous exam.    Assessment:  Iam Acevedo is a 65 y.o. male with a history of HTN, COPD, CAD, PAD s/p balloon angioplasty of the R SFA/pop on 6/3/24 + left iliofemdart with patch angioplasty in March of 2025, and a LLL adenocarcinoma. Pt is now s/p a bronchoscopy, left robotic assisted lower lobectomy, mediastinal lymph node dissection, and multilevel rib blocks with Dr. Young on 6/23/25. Postoperatively, the patient maintained a single left chest tube. Chest tube maintained to waterseal with a small air leak.    Plan:  Neurology: Post operative pain  -Continue PO pain medications, oxycodone and Tylenol, monitor effectiveness, adjust dose as needed  -Continue gabapentin and Robaxin as needed for multimodal pain control   -Lidocaine patches for incisional discomfort  -Bowel regimen available for constipation secondary to pain medication   -Out of bed to the chair throughout the day  -Encourage ambulation as tolerated  -PT/OT consulted, appreciate recs, no needs on discharge anticipated    Cardiovascular: Hx of HTN, CAD, PAD s/p balloon angioplasty of the R SFA/pop on 6/3/24 + left iliofemdart with patch angioplasty in March of 2025. On atorvastatin, amlodipine, aspirin, and  Eliquis at home.   -Continue telemetry  -Continue Q4h VS  -Continue home regimen of hydrochlorothiazide, Norvasc  -Continue home regimen of ASA, atorvastatin  -Continue to hold Lisinopril and Eliquis in the immediate post-op setting, resume lisinopril as blood pressure allows, timing of restarting Eliquis postoperatively to be determined by Dr. Young.  -Replace electrolytes as needed for K >4, Mg >2    Pulmonology: Hx of LLL adenocarcinoma. Now s/p a bronchoscopy, left robotic assisted lower lobectomy, mediastinal lymph node dissection, and multilevel rib blocks with Dr. Young on 6/23/25. Post op atelectasis, chest tube management, COPD. On albuterol and Advair at home.   -Encourage incentive spirometer use every hour  -Continue pulmonary hygiene  -Maintain left chest tube to WS, anticipate removal once air leak resolves  -Obtain daily CXR  -Monitor and record chest tube drainage every shift  -Continue Breo Ellipta in place of home Advair    Gastrointestinal:   -Regular diet as tolerated  -Zofran available for nausea  -Scheduled colace, PRN bowel regimen    Genitourinary:   -BMPs only as needed  -Adequate urine output    Infectious Disease: Afebrile  -Continue to trend daily temperatures to monitor for signs of post-operative infection   -Monitor surgical incisions for signs of infection   -Perioperative antibiotics completed     Hematology/DVT Prophylaxis: PAD - On Eliquis at home.  -Continue subcutaneous heparin and SCDs, early ambulation  -Monitor for signs of acute blood loss  -Trend CBCs only as needed  -Continue to home ferrous sulfate  -Continue to hold Eliquis in immediate post-op setting, timing of restart to be determined by Dr. Young    Disposition:  -Plan for discharge to home once stable from a surgical standpoint.  -Continue to assess for home-going needs     Patient seen and examined by this provider. Pt discussed with attending surgeon Dr. Young.     NEVA BanerjeeC       [1]  acetaminophen, 650 mg, oral, q6h  amLODIPine, 10 mg, oral, Daily  aspirin, 81 mg, oral, Daily  atorvastatin, 40 mg, oral, Daily  ferrous sulfate, 65 mg of elemental iron, oral, Daily  fluticasone furoate-vilanteroL, 1 puff, inhalation, Daily  gabapentin, 100 mg, oral, TID  heparin (porcine), 5,000 Units, subcutaneous, q8h  hydroCHLOROthiazide, 12.5 mg, oral, Daily  lidocaine, 1 patch, transdermal, Daily  sennosides-docusate sodium, 2 tablet, oral, BID     [2]    [3] PRN medications: HYDROmorphone, ipratropium-albuteroL, methocarbamol, naloxone, ondansetron, oxyCODONE, oxyCODONE, oxygen

## 2025-06-26 ENCOUNTER — APPOINTMENT (OUTPATIENT)
Dept: RADIOLOGY | Facility: HOSPITAL | Age: 65
DRG: 164 | End: 2025-06-26
Payer: COMMERCIAL

## 2025-06-26 PROCEDURE — 1200000002 HC GENERAL ROOM WITH TELEMETRY DAILY

## 2025-06-26 PROCEDURE — 2500000001 HC RX 250 WO HCPCS SELF ADMINISTERED DRUGS (ALT 637 FOR MEDICARE OP): Performed by: PHYSICIAN ASSISTANT

## 2025-06-26 PROCEDURE — 94640 AIRWAY INHALATION TREATMENT: CPT

## 2025-06-26 PROCEDURE — 71045 X-RAY EXAM CHEST 1 VIEW: CPT

## 2025-06-26 PROCEDURE — 2500000005 HC RX 250 GENERAL PHARMACY W/O HCPCS: Performed by: PHYSICIAN ASSISTANT

## 2025-06-26 PROCEDURE — 99232 SBSQ HOSP IP/OBS MODERATE 35: CPT | Performed by: NURSE PRACTITIONER

## 2025-06-26 PROCEDURE — 2500000004 HC RX 250 GENERAL PHARMACY W/ HCPCS (ALT 636 FOR OP/ED): Performed by: NURSE PRACTITIONER

## 2025-06-26 PROCEDURE — 2500000004 HC RX 250 GENERAL PHARMACY W/ HCPCS (ALT 636 FOR OP/ED): Performed by: PHYSICIAN ASSISTANT

## 2025-06-26 RX ORDER — GUAIFENESIN 600 MG/1
600 TABLET, EXTENDED RELEASE ORAL 2 TIMES DAILY
Status: DISCONTINUED | OUTPATIENT
Start: 2025-06-26 | End: 2025-06-27 | Stop reason: HOSPADM

## 2025-06-26 RX ADMIN — ACETAMINOPHEN 650 MG: 325 TABLET, FILM COATED ORAL at 01:05

## 2025-06-26 RX ADMIN — DOCUSATE SODIUM 50 MG AND SENNOSIDES 8.6 MG 2 TABLET: 8.6; 5 TABLET, FILM COATED ORAL at 20:45

## 2025-06-26 RX ADMIN — GABAPENTIN 100 MG: 100 CAPSULE ORAL at 15:10

## 2025-06-26 RX ADMIN — ATORVASTATIN CALCIUM 40 MG: 40 TABLET, FILM COATED ORAL at 20:45

## 2025-06-26 RX ADMIN — HEPARIN SODIUM 5000 UNITS: 5000 INJECTION, SOLUTION INTRAVENOUS; SUBCUTANEOUS at 08:28

## 2025-06-26 RX ADMIN — GUAIFENESIN 600 MG: 600 TABLET, EXTENDED RELEASE ORAL at 08:28

## 2025-06-26 RX ADMIN — GUAIFENESIN 600 MG: 600 TABLET, EXTENDED RELEASE ORAL at 20:45

## 2025-06-26 RX ADMIN — LIDOCAINE 4% 1 PATCH: 40 PATCH TOPICAL at 08:27

## 2025-06-26 RX ADMIN — GABAPENTIN 100 MG: 100 CAPSULE ORAL at 08:27

## 2025-06-26 RX ADMIN — GABAPENTIN 100 MG: 100 CAPSULE ORAL at 20:45

## 2025-06-26 RX ADMIN — HEPARIN SODIUM 5000 UNITS: 5000 INJECTION, SOLUTION INTRAVENOUS; SUBCUTANEOUS at 00:58

## 2025-06-26 RX ADMIN — FERROUS SULFATE TAB 325 MG (65 MG ELEMENTAL FE) 1 TABLET: 325 (65 FE) TAB at 20:45

## 2025-06-26 RX ADMIN — HEPARIN SODIUM 5000 UNITS: 5000 INJECTION, SOLUTION INTRAVENOUS; SUBCUTANEOUS at 15:10

## 2025-06-26 RX ADMIN — FLUTICASONE FUROATE AND VILANTEROL TRIFENATATE 1 PUFF: 200; 25 POWDER RESPIRATORY (INHALATION) at 09:01

## 2025-06-26 RX ADMIN — AMLODIPINE BESYLATE 10 MG: 10 TABLET ORAL at 08:28

## 2025-06-26 RX ADMIN — ACETAMINOPHEN 650 MG: 325 TABLET, FILM COATED ORAL at 06:00

## 2025-06-26 RX ADMIN — OXYCODONE 5 MG: 5 TABLET ORAL at 20:46

## 2025-06-26 RX ADMIN — ASPIRIN 81 MG: 81 TABLET, COATED ORAL at 20:45

## 2025-06-26 RX ADMIN — ACETAMINOPHEN 650 MG: 325 TABLET, FILM COATED ORAL at 18:40

## 2025-06-26 RX ADMIN — HYDROCHLOROTHIAZIDE 12.5 MG: 25 TABLET ORAL at 08:28

## 2025-06-26 RX ADMIN — OXYCODONE 5 MG: 5 TABLET ORAL at 05:56

## 2025-06-26 RX ADMIN — ACETAMINOPHEN 650 MG: 325 TABLET, FILM COATED ORAL at 12:00

## 2025-06-26 ASSESSMENT — COGNITIVE AND FUNCTIONAL STATUS - GENERAL
DAILY ACTIVITIY SCORE: 23
MOBILITY SCORE: 23
HELP NEEDED FOR BATHING: A LITTLE
DRESSING REGULAR LOWER BODY CLOTHING: A LITTLE
DAILY ACTIVITIY SCORE: 21
MOBILITY SCORE: 24
CLIMB 3 TO 5 STEPS WITH RAILING: A LITTLE
DRESSING REGULAR LOWER BODY CLOTHING: A LITTLE
DRESSING REGULAR UPPER BODY CLOTHING: A LITTLE

## 2025-06-26 ASSESSMENT — PAIN DESCRIPTION - ORIENTATION: ORIENTATION: LEFT

## 2025-06-26 ASSESSMENT — PAIN SCALES - GENERAL
PAINLEVEL_OUTOF10: 6
PAINLEVEL_OUTOF10: 2
PAINLEVEL_OUTOF10: 4
PAINLEVEL_OUTOF10: 6
PAINLEVEL_OUTOF10: 0 - NO PAIN
PAINLEVEL_OUTOF10: 0 - NO PAIN

## 2025-06-26 ASSESSMENT — PAIN - FUNCTIONAL ASSESSMENT
PAIN_FUNCTIONAL_ASSESSMENT: 0-10

## 2025-06-26 ASSESSMENT — PAIN DESCRIPTION - LOCATION
LOCATION: CHEST

## 2025-06-26 ASSESSMENT — PAIN DESCRIPTION - DESCRIPTORS
DESCRIPTORS: DISCOMFORT;ACHING
DESCRIPTORS: ACHING;DISCOMFORT

## 2025-06-26 NOTE — PROGRESS NOTES
"Thoracic Surgery Progress Note  6/26/2025    Iam Acevedo is a 65 y.o. male with a history of HTN, COPD, CAD, PAD s/p balloon angioplasty of the R SFA/pop on 6/3/24 + left iliofemdart with patch angioplasty in March of 2025, and a LLL adenocarcinoma. Pt is now s/p a bronchoscopy, left robotic assisted lower lobectomy, mediastinal lymph node dissection, and multilevel rib blocks with Dr. Young on 6/23/25.     Overnight issues: No acute events overnight. Patient offers no complaints this morning. Breathing comfortably on room air with rhonchorous cough.  CT remains to WS with stable imaging, small airleak seen at the end of valsalva.     Physical Exam:  Visit Vitals  /71 (BP Location: Left arm, Patient Position: Sitting)   Pulse 77   Temp 36.6 °C (97.9 °F) (Temporal)   Resp 18   Ht 1.651 m (5' 5\")   Wt 69.2 kg (152 lb 8 oz)   SpO2 92%   BMI 25.38 kg/m²   Smoking Status Former   BSA 1.78 m²     GENERAL: He is a pleasant male currently in no distress, seen sitting up in the chair.  HEENT: Normocephalic and atraumatic.   NECK: Supple. Trach midline. No JVD.   CHEST: Breathing comfortably on room air with rhonchorous cough.  CT remains to WS with stable imaging, small airleak seen at the end of valsalva.   HEART: Regular rate and rhythm.  ABDOMEN: Soft, ND, nontender abdomen. No rebound or guarding.  NEUROLOGIC: Alert and oriented. Grossly intact.   EXTREMITIES: Moves all extremities equally. No lower extremity edema. No calf tenderness.     Diagnostics:     Intake/Output Summary (Last 24 hours) at 6/26/2025 0833  Last data filed at 6/26/2025 0741  Gross per 24 hour   Intake 600 ml   Output 1920 ml   Net -1320 ml     [facility]      CXR on 6/26: AM CXR reviewed by this provider. Expected post op changes and chest tube placement. No clinically significant pneumothorax. Remains stable in comparison with previous exam.    Assessment:  Iam Acevedo is a 65 y.o. male with a history of HTN, " COPD, CAD, PAD s/p balloon angioplasty of the R SFA/pop on 6/3/24 + left iliofemdart with patch angioplasty in March of 2025, and a LLL adenocarcinoma. Pt is now s/p a bronchoscopy, left robotic assisted lower lobectomy, mediastinal lymph node dissection, and multilevel rib blocks with Dr. Young on 6/23/25. Postoperatively, the patient maintained a single left chest tube. Chest tube maintained to waterseal with a small air leak.    Plan:  Neurology: Post operative pain  -Continue PO pain medications, oxycodone and Tylenol, monitor effectiveness, adjust dose as needed  -Continue gabapentin and Robaxin as needed for multimodal pain control   -Lidocaine patches for incisional discomfort  -Bowel regimen available for constipation secondary to pain medication   -Out of bed to the chair throughout the day  -Encourage ambulation as tolerated  -PT/OT consulted, appreciate recs, no needs on discharge anticipated    Cardiovascular: Hx of HTN, CAD, PAD s/p balloon angioplasty of the R SFA/pop on 6/3/24 + left iliofemdart with patch angioplasty in March of 2025. On atorvastatin, amlodipine, aspirin, and Eliquis at home.   -Continue telemetry  -Continue Q4h VS  -Continue home regimen of hydrochlorothiazide, Norvasc  -Continue home regimen of ASA, atorvastatin  -Continue to hold Lisinopril and Eliquis in the immediate post-op setting, resume lisinopril as blood pressure allows, timing of restarting Eliquis postoperatively to be determined by Dr. Young.  -Replace electrolytes as needed for K >4, Mg >2    Pulmonology: Hx of LLL adenocarcinoma. Now s/p a bronchoscopy, left robotic assisted lower lobectomy, mediastinal lymph node dissection, and multilevel rib blocks with Dr. Young on 6/23/25. Post op atelectasis, chest tube management, COPD. On albuterol and Advair at home.   -Encourage incentive spirometer use every hour  -Continue pulmonary hygiene--guaifenesin and PEP valve added this AM   -Maintain left chest tube to WS,  anticipate removal once air leak resolves  -Obtain daily CXR  -Monitor and record chest tube drainage every shift  -Continue Breo Ellipta in place of home Advair    Gastrointestinal:   -Regular diet as tolerated  -Zofran available for nausea  -Scheduled colace, PRN bowel regimen    Genitourinary:   -BMPs only as needed  -Adequate urine output    Infectious Disease: Afebrile  -Continue to trend daily temperatures to monitor for signs of post-operative infection   -Monitor surgical incisions for signs of infection   -Perioperative antibiotics completed     Hematology/DVT Prophylaxis: PAD - On Eliquis at home.  -Continue subcutaneous heparin and SCDs, early ambulation  -Monitor for signs of acute blood loss  -Trend CBCs only as needed  -Continue to home ferrous sulfate  -Continue to hold Eliquis in immediate post-op setting, timing of restart to be determined by Dr. Young    Disposition:  -Plan for discharge to home once stable from a surgical standpoint.  -Continue to assess for home-going needs     Patient seen and examined by this provider. Pt discussed with attending surgeon Dr. Young.     KAILEY Fierro-CNP  Department of Thoracic and Esophageal Surgery  Thoracic Surgery pager [phone]

## 2025-06-26 NOTE — CARE PLAN
Problem: Pain - Adult  Goal: Verbalizes/displays adequate comfort level or baseline comfort level  Outcome: Progressing     Problem: Safety - Adult  Goal: Free from fall injury  Outcome: Progressing     Problem: Discharge Planning  Goal: Discharge to home or other facility with appropriate resources  Outcome: Progressing     Problem: Chronic Conditions and Co-morbidities  Goal: Patient's chronic conditions and co-morbidity symptoms are monitored and maintained or improved  Outcome: Progressing     Problem: Nutrition  Goal: Nutrient intake appropriate for maintaining nutritional needs  Outcome: Progressing     Problem: Pain  Goal: Takes deep breaths with improved pain control throughout the shift  Outcome: Progressing  Goal: Turns in bed with improved pain control throughout the shift  Outcome: Progressing  Goal: Walks with improved pain control throughout the shift  Outcome: Progressing  Goal: Performs ADL's with improved pain control throughout shift  Outcome: Progressing  Goal: Participates in PT with improved pain control throughout the shift  Outcome: Progressing  Goal: Free from opioid side effects throughout the shift  Outcome: Progressing  Goal: Free from acute confusion related to pain meds throughout the shift  Outcome: Progressing     Problem: Fall/Injury  Goal: Not fall by end of shift  Outcome: Progressing  Goal: Be free from injury by end of the shift  Outcome: Progressing  Goal: Verbalize understanding of personal risk factors for fall in the hospital  Outcome: Progressing  Goal: Verbalize understanding of risk factor reduction measures to prevent injury from fall in the home  Outcome: Progressing  Goal: Use assistive devices by end of the shift  Outcome: Progressing  Goal: Pace activities to prevent fatigue by end of the shift  Outcome: Progressing

## 2025-06-27 ENCOUNTER — APPOINTMENT (OUTPATIENT)
Dept: RADIOLOGY | Facility: HOSPITAL | Age: 65
DRG: 164 | End: 2025-06-27
Payer: COMMERCIAL

## 2025-06-27 VITALS
RESPIRATION RATE: 18 BRPM | TEMPERATURE: 97.9 F | WEIGHT: 152.5 LBS | DIASTOLIC BLOOD PRESSURE: 79 MMHG | HEART RATE: 83 BPM | SYSTOLIC BLOOD PRESSURE: 131 MMHG | OXYGEN SATURATION: 95 % | HEIGHT: 65 IN | BODY MASS INDEX: 25.41 KG/M2

## 2025-06-27 PROCEDURE — 94668 MNPJ CHEST WALL SBSQ: CPT

## 2025-06-27 PROCEDURE — 71045 X-RAY EXAM CHEST 1 VIEW: CPT

## 2025-06-27 PROCEDURE — 2500000001 HC RX 250 WO HCPCS SELF ADMINISTERED DRUGS (ALT 637 FOR MEDICARE OP): Performed by: PHYSICIAN ASSISTANT

## 2025-06-27 PROCEDURE — 94669 MECHANICAL CHEST WALL OSCILL: CPT

## 2025-06-27 PROCEDURE — 2500000005 HC RX 250 GENERAL PHARMACY W/O HCPCS: Performed by: PHYSICIAN ASSISTANT

## 2025-06-27 PROCEDURE — 99239 HOSP IP/OBS DSCHRG MGMT >30: CPT | Performed by: NURSE PRACTITIONER

## 2025-06-27 PROCEDURE — 2500000004 HC RX 250 GENERAL PHARMACY W/ HCPCS (ALT 636 FOR OP/ED): Performed by: NURSE PRACTITIONER

## 2025-06-27 PROCEDURE — 2500000004 HC RX 250 GENERAL PHARMACY W/ HCPCS (ALT 636 FOR OP/ED): Performed by: PHYSICIAN ASSISTANT

## 2025-06-27 PROCEDURE — 94640 AIRWAY INHALATION TREATMENT: CPT

## 2025-06-27 RX ORDER — OXYCODONE HYDROCHLORIDE 5 MG/1
5 TABLET ORAL EVERY 6 HOURS PRN
Qty: 18 TABLET | Refills: 0 | Status: SHIPPED | OUTPATIENT
Start: 2025-06-27

## 2025-06-27 RX ORDER — LIDOCAINE 560 MG/1
1 PATCH PERCUTANEOUS; TOPICAL; TRANSDERMAL DAILY
Start: 2025-06-28

## 2025-06-27 RX ORDER — GUAIFENESIN 600 MG/1
600 TABLET, EXTENDED RELEASE ORAL 2 TIMES DAILY
Qty: 28 TABLET | Refills: 0 | Status: SHIPPED | OUTPATIENT
Start: 2025-06-27

## 2025-06-27 RX ORDER — GABAPENTIN 100 MG/1
100 CAPSULE ORAL 3 TIMES DAILY
Qty: 30 CAPSULE | Refills: 0 | Status: SHIPPED | OUTPATIENT
Start: 2025-06-27

## 2025-06-27 RX ORDER — ACETAMINOPHEN 325 MG/1
650 TABLET ORAL EVERY 6 HOURS
Start: 2025-06-27

## 2025-06-27 RX ORDER — METHOCARBAMOL 500 MG/1
500 TABLET, FILM COATED ORAL EVERY 8 HOURS PRN
Qty: 24 TABLET | Refills: 0 | Status: SHIPPED | OUTPATIENT
Start: 2025-06-27

## 2025-06-27 RX ADMIN — DOCUSATE SODIUM 50 MG AND SENNOSIDES 8.6 MG 2 TABLET: 8.6; 5 TABLET, FILM COATED ORAL at 08:11

## 2025-06-27 RX ADMIN — HYDROCHLOROTHIAZIDE 12.5 MG: 25 TABLET ORAL at 08:11

## 2025-06-27 RX ADMIN — AMLODIPINE BESYLATE 10 MG: 10 TABLET ORAL at 08:11

## 2025-06-27 RX ADMIN — HEPARIN SODIUM 5000 UNITS: 5000 INJECTION, SOLUTION INTRAVENOUS; SUBCUTANEOUS at 05:49

## 2025-06-27 RX ADMIN — GUAIFENESIN 600 MG: 600 TABLET, EXTENDED RELEASE ORAL at 08:11

## 2025-06-27 RX ADMIN — ACETAMINOPHEN 650 MG: 325 TABLET, FILM COATED ORAL at 05:49

## 2025-06-27 RX ADMIN — ACETAMINOPHEN 650 MG: 325 TABLET, FILM COATED ORAL at 00:00

## 2025-06-27 RX ADMIN — GABAPENTIN 100 MG: 100 CAPSULE ORAL at 08:11

## 2025-06-27 RX ADMIN — HEPARIN SODIUM 5000 UNITS: 5000 INJECTION, SOLUTION INTRAVENOUS; SUBCUTANEOUS at 00:00

## 2025-06-27 RX ADMIN — FLUTICASONE FUROATE AND VILANTEROL TRIFENATATE 1 PUFF: 200; 25 POWDER RESPIRATORY (INHALATION) at 11:01

## 2025-06-27 RX ADMIN — ACETAMINOPHEN 650 MG: 325 TABLET, FILM COATED ORAL at 12:56

## 2025-06-27 RX ADMIN — LIDOCAINE 4% 1 PATCH: 40 PATCH TOPICAL at 08:11

## 2025-06-27 ASSESSMENT — PAIN SCALES - GENERAL: PAINLEVEL_OUTOF10: 0 - NO PAIN

## 2025-06-27 ASSESSMENT — PAIN - FUNCTIONAL ASSESSMENT: PAIN_FUNCTIONAL_ASSESSMENT: 0-10

## 2025-06-27 NOTE — PROGRESS NOTES
"Thoracic Surgery Progress Note  6/27/2025    Iam Acevedo is a 65 y.o. male with a history of HTN, COPD, CAD, PAD s/p balloon angioplasty of the R SFA/pop on 6/3/24 + left iliofemdart with patch angioplasty in March of 2025, and a LLL adenocarcinoma. Pt is now s/p a bronchoscopy, left robotic assisted lower lobectomy, mediastinal lymph node dissection, and multilevel rib blocks with Dr. Young on 6/23/25.     Overnight issues: No acute events overnight. Patient offers no complaints this morning. Breathing comfortably on room air with improved rhonchorous cough.  CT remains to  with stable imaging, exam is unchanged with small airleak seen at the end of valsalva.     Physical Exam:  Visit Vitals  /71 (BP Location: Left arm, Patient Position: Lying)   Pulse 79   Temp 36.6 °C (97.9 °F) (Temporal)   Resp 18   Ht 1.651 m (5' 5\")   Wt 69.2 kg (152 lb 8 oz)   SpO2 93%   BMI 25.38 kg/m²   Smoking Status Former   BSA 1.78 m²     GENERAL: He is a pleasant male currently in no distress, seen sitting up in the chair.  HEENT: Normocephalic and atraumatic.   NECK: Supple. Trach midline. No JVD.   CHEST: Breathing comfortably on room air with rhonchorous cough.  CT remains to  with stable imaging, small airleak seen at the end of valsalva. Clamped on AM rounds.   HEART: Regular rate and rhythm.  ABDOMEN: Soft, ND, nontender abdomen. No rebound or guarding.  NEUROLOGIC: Alert and oriented. Grossly intact. Gait stable without use of devices.  EXTREMITIES: Moves all extremities equally. No lower extremity edema. No calf tenderness.     Diagnostics:     Intake/Output Summary (Last 24 hours) at 6/27/2025 0843  Last data filed at 6/27/2025 0757  Gross per 24 hour   Intake 902.37 ml   Output 270 ml   Net 632.37 ml     Medications reviewed in [facility]      CXR on 6/2: AM CXR reviewed by this provider. Expected post op changes and chest tube placement. No clinically significant pneumothorax. Remains stable in comparison with " previous exam.    Assessment:  Iam Acevedo is a 65 y.o. male with a history of HTN, COPD, CAD, PAD s/p balloon angioplasty of the R SFA/pop on 6/3/24 + left iliofemdart with patch angioplasty in March of 2025, and a LLL adenocarcinoma. Pt is now s/p a bronchoscopy, left robotic assisted lower lobectomy, mediastinal lymph node dissection, and multilevel rib blocks with Dr. Young on 6/23/25. Postoperatively, the patient maintained a single left chest tube. Chest tube maintained to waterseal with a small air leak. Clamp trial initiated this am at 730.     Plan:  Neurology: Post operative pain  -Continue PO pain medications, oxycodone and Tylenol, monitor effectiveness, adjust dose as needed  -Continue gabapentin and Robaxin as needed for multimodal pain control   -Lidocaine patches for incisional discomfort  -Bowel regimen available for constipation secondary to pain medication   -Out of bed to the chair throughout the day  -Encourage ambulation as tolerated  -PT/OT consulted, appreciate recs, no needs on discharge anticipated    Cardiovascular: Hx of HTN, CAD, PAD s/p balloon angioplasty of the R SFA/pop on 6/3/24 + left iliofemdart with patch angioplasty in March of 2025. On atorvastatin, amlodipine, aspirin, and Eliquis at home.   -Continue telemetry  -Continue Q4h VS  -Continue home regimen of hydrochlorothiazide, Norvasc  -Continue home regimen of ASA, atorvastatin  -Continue to hold Lisinopril and Eliquis in the immediate post-op setting, resume lisinopril as blood pressure allows, timing of restarting Eliquis postoperatively to be determined by Dr. Young.  -Replace electrolytes as needed for K >4, Mg >2    Pulmonology: Hx of LLL adenocarcinoma. Now s/p a bronchoscopy, left robotic assisted lower lobectomy, mediastinal lymph node dissection, and multilevel rib blocks with Dr. Young on 6/23/25. Post op atelectasis, chest tube management, COPD. On albuterol and Advair at home.   -Encourage incentive  spirometer use every hour  -Continue pulmonary hygiene--guaifenesin and PEP valve added 6/26  -Left chest tube clamped. CXR at 1130  -Obtain daily CXR  -Monitor and record chest tube drainage every shift  -Continue Breo Ellipta in place of home Advair    Gastrointestinal:   -Regular diet as tolerated  -Zofran available for nausea  -Scheduled colace, PRN bowel regimen    Genitourinary:   -BMPs only as needed  -Adequate urine output    Infectious Disease: Afebrile  -Continue to trend daily temperatures to monitor for signs of post-operative infection   -Monitor surgical incisions for signs of infection   -Perioperative antibiotics completed     Hematology/DVT Prophylaxis: PAD - On Eliquis at home.  -Continue subcutaneous heparin and SCDs, early ambulation  -Monitor for signs of acute blood loss  -Trend CBCs only as needed  -Continue to home ferrous sulfate  -Continue to hold Eliquis in immediate post-op setting, timing of restart to be determined by Dr. Young    Disposition:  -Plan for discharge to home once stable from a surgical standpoint.  -Continue to assess for home-going needs, but none percieved    Patient seen and examined by this provider. Pt discussed with attending surgeon Dr. Young.     KAILEY Fierro-CNP  Department of Thoracic and Esophageal Surgery  Thoracic Surgery pager [phone]

## 2025-06-27 NOTE — CARE PLAN
The patient's goals for the shift include      The clinical goals for the shift include Patient will remain safe throughout the shift.    Patient will remain hemodynamically stable throughout the shift.

## 2025-06-27 NOTE — NURSING NOTE
Discharge instructions reviewed with patient and wife. All questions answered. Patient to go home with mini atrium CT-teaching completed with patient and wife per KAILEY Roger. No further concerns at time of discharge.

## 2025-06-27 NOTE — DISCHARGE SUMMARY
Discharge Diagnosis  Malignant neoplasm of lower lobe of left lung (Multi)    Issues Requiring Follow-Up  Final pathology   Chest tube removal    Test Results Pending At Discharge  Pending Labs       Order Current Status    Surgical Pathology Exam In process          Hospital Course   Iam Acevedo is a 65 y.o. male with a history of HTN, COPD, CAD, PAD s/p balloon angioplasty of the R SFA/pop on 6/3/24 + left iliofemdart with patch angioplasty in March of 2025, and a LLL adenocarcinoma. Pt is now s/p a bronchoscopy, left robotic assisted lower lobectomy, mediastinal lymph node dissection, and multilevel rib blocks with Dr. Young on 6/23/25. Post operatively he was left with a single chest tube.  Unsuccessful clamp trial on pod #4 with accumulation of subcutaneous emphysema.  Chest tube placed to breeze one way valve on 6/27/2025 and patient was discharged home.    At the time of discharge, his pain was controlled on an oral pain regimen, He was breathing comfortably on room air.  Hewas ambulating independently.  He was tolerating a regular diet without nausea. He was voiding regularly.      The patient was educated on post operative activity restrictions, dietary guidelines, and pain management. He will follow up with Dr. Young in the outpatient setting on Tuesday 7/1/2025 with a CXR just prior to this visit. The patient has been instructed to add an OTC stool softeners or laxative while taking oral analgesia.  Deep breathing, coughing, and walking at home encouraged. All questions have been answered and concerns addressed until there were none. Chest tube management including removal of fluid and dressing changes to the site reviewed with the patient and patient's wife.  Supplies have been given to them to change the dressing and empty the breeze one way valve.     Visit Vitals  /79 (BP Location: Left arm, Patient Position: Sitting)   Pulse 83   Temp 36.6 °C (97.9 °F) (Temporal)   Resp 18     Vitals:  "   06/25/25 0630   Weight: 69.2 kg (152 lb 8 oz)       Immunization History   Administered Date(s) Administered    Flu vaccine (IIV4), preservative free *Check age/dose* 10/09/2015, 09/22/2016, 03/22/2022    Flu vaccine, quadrivalent, recombinant, preservative free, adult (FLUBLOK) 09/16/2020    Influenza, seasonal, injectable 03/18/2015    Pneumococcal conjugate vaccine, 13-valent (PREVNAR 13) 10/09/2015     Pertinent Physical Exam At Time of Discharge  Visit Vitals  /71 (BP Location: Left arm, Patient Position: Lying)   Pulse 79   Temp 36.6 °C (97.9 °F) (Temporal)   Resp 18   Ht 1.651 m (5' 5\")   Wt 69.2 kg (152 lb 8 oz)   SpO2 93%   BMI 25.38 kg/m²   Smoking Status Former   BSA 1.78 m²      GENERAL: He is a pleasant male currently in no distress, seen sitting up in the chair.  HEENT: Normocephalic and atraumatic.   NECK: Supple. Trach midline. No JVD.   CHEST: Breathing comfortably on room air with rhonchorous cough.  CT remains to WS with stable imaging, small airleak seen at the end of valsalva. Clamped on AM rounds.   HEART: Regular rate and rhythm.  ABDOMEN: Soft, ND, nontender abdomen. No rebound or guarding.  NEUROLOGIC: Alert and oriented. Grossly intact. Gait stable without use of devices.  EXTREMITIES: Moves all extremities equally. No lower extremity edema. No calf tenderness.     Home Medications     Medication List      START taking these medications     acetaminophen 325 mg tablet; Commonly known as: Tylenol; Take 2 tablets   (650 mg) by mouth every 6 hours.   gabapentin 100 mg capsule; Commonly known as: Neurontin; Take 1 capsule   (100 mg) by mouth 3 times a day.   guaiFENesin 600 mg 12 hr tablet; Commonly known as: Mucinex; Take 1   tablet (600 mg) by mouth 2 times a day. Do not crush, chew, or split.   lidocaine 4 % patch; Place 1 patch over 12 hours on the skin once daily.   Remove & discard patch within 12 hours or as directed by MD.; Start taking   on: June 28, 2025   methocarbamol 500 " mg tablet; Commonly known as: Robaxin; Take 1 tablet   (500 mg) by mouth every 8 hours if needed for muscle spasms.   oxyCODONE 5 mg immediate release tablet; Commonly known as: Roxicodone;   Take 1 tablet (5 mg) by mouth every 6 hours if needed for severe pain (7 -   10).     CONTINUE taking these medications     albuterol 90 mcg/actuation inhaler; Inhale 1 puff every 4 hours if   needed for wheezing or shortness of breath.   amLODIPine 10 mg tablet; Commonly known as: Norvasc; Take 1 tablet (10   mg) by mouth once daily.   aspirin 81 mg EC tablet   atorvastatin 40 mg tablet; Commonly known as: Lipitor; Take 1 tablet (40   mg) by mouth once daily.   b complex 0.4 mg tablet   ferrous sulfate 325 (65 Fe) mg EC tablet   fluticasone propion-salmeteroL 250-50 mcg/dose diskus inhaler; Commonly   known as: Advair Diskus; Inhale 1 puff 2 times a day.   lisinopriL-hydrochlorothiazide 20-12.5 mg tablet; Take 1 tablet by mouth   once daily.   multivitamin with minerals tablet     STOP taking these medications     apixaban 5 mg tablet; Commonly known as: Eliquis   nicotine 14 mg/24 hr patch; Commonly known as: Nicoderm CQ       Outpatient Follow-Up  Future Appointments   Date Time Provider Department [facility]   7/1/2025 10:15 AM Edmundo Lambert MD [id number] [facility]   7/8/2025  9:15 AM Edmundo Lambert MD [id number] [facility]   7/10/2025  2:00 PM [facility] ROOM [id number] [facility]   7/10/2025  3:00 PM Mabel Turner MD [address]   Time spent with discharge was >30 minutes and included explanation of discharge instructions, arranging homegoing prescriptions, checking OARRS, supplies and follow-up, and creating the discharge summary of the patient's hospialization.    Acacia Tamayo, APRN-CNP

## 2025-06-29 DIAGNOSIS — I73.9 PAD (PERIPHERAL ARTERY DISEASE): ICD-10-CM

## 2025-06-29 DIAGNOSIS — I25.10 CORONARY ARTERY DISEASE INVOLVING NATIVE HEART, UNSPECIFIED VESSEL OR LESION TYPE, UNSPECIFIED WHETHER ANGINA PRESENT: ICD-10-CM

## 2025-06-29 DIAGNOSIS — I15.9 SECONDARY HYPERTENSION: ICD-10-CM

## 2025-06-29 DIAGNOSIS — J44.9 CHRONIC OBSTRUCTIVE PULMONARY DISEASE, UNSPECIFIED COPD TYPE (MULTI): ICD-10-CM

## 2025-06-30 RX ORDER — LISINOPRIL AND HYDROCHLOROTHIAZIDE 12.5; 2 MG/1; MG/1
1 TABLET ORAL DAILY
Qty: 90 TABLET | Refills: 3 | Status: SHIPPED | OUTPATIENT
Start: 2025-06-30

## 2025-06-30 RX ORDER — ATORVASTATIN CALCIUM 40 MG/1
40 TABLET, FILM COATED ORAL DAILY
Qty: 90 TABLET | Refills: 3 | Status: SHIPPED | OUTPATIENT
Start: 2025-06-30

## 2025-06-30 RX ORDER — FLUTICASONE PROPIONATE AND SALMETEROL 250; 50 UG/1; UG/1
POWDER RESPIRATORY (INHALATION)
Qty: 180 EACH | Refills: 3 | Status: SHIPPED | OUTPATIENT
Start: 2025-06-30

## 2025-06-30 RX ORDER — AMLODIPINE BESYLATE 10 MG/1
10 TABLET ORAL DAILY
Qty: 90 TABLET | Refills: 3 | Status: SHIPPED | OUTPATIENT
Start: 2025-06-30

## 2025-06-30 RX ORDER — ALBUTEROL SULFATE 90 UG/1
2 INHALANT RESPIRATORY (INHALATION) EVERY 6 HOURS PRN
Qty: 18 G | Refills: 2 | Status: SHIPPED | OUTPATIENT
Start: 2025-06-30

## 2025-07-01 ENCOUNTER — HOSPITAL ENCOUNTER (OUTPATIENT)
Dept: RADIOLOGY | Facility: CLINIC | Age: 65
Discharge: HOME | End: 2025-07-01
Payer: COMMERCIAL

## 2025-07-01 ENCOUNTER — OFFICE VISIT (OUTPATIENT)
Facility: CLINIC | Age: 65
End: 2025-07-01
Payer: COMMERCIAL

## 2025-07-01 VITALS
OXYGEN SATURATION: 97 % | SYSTOLIC BLOOD PRESSURE: 136 MMHG | HEART RATE: 75 BPM | DIASTOLIC BLOOD PRESSURE: 80 MMHG | RESPIRATION RATE: 18 BRPM | HEIGHT: 65 IN | BODY MASS INDEX: 25.33 KG/M2 | WEIGHT: 152 LBS

## 2025-07-01 DIAGNOSIS — J95.811 POSTPROCEDURAL PNEUMOTHORAX: ICD-10-CM

## 2025-07-01 DIAGNOSIS — Z09 S/P LUNG SURGERY, FOLLOW-UP EXAM: ICD-10-CM

## 2025-07-01 DIAGNOSIS — C34.32 MALIGNANT NEOPLASM OF LOWER LOBE OF LEFT LUNG (MULTI): Primary | ICD-10-CM

## 2025-07-01 PROCEDURE — 3079F DIAST BP 80-89 MM HG: CPT | Performed by: PHYSICIAN ASSISTANT

## 2025-07-01 PROCEDURE — 3075F SYST BP GE 130 - 139MM HG: CPT | Performed by: PHYSICIAN ASSISTANT

## 2025-07-01 PROCEDURE — 1159F MED LIST DOCD IN RCRD: CPT | Performed by: PHYSICIAN ASSISTANT

## 2025-07-01 PROCEDURE — 71046 X-RAY EXAM CHEST 2 VIEWS: CPT | Performed by: STUDENT IN AN ORGANIZED HEALTH CARE EDUCATION/TRAINING PROGRAM

## 2025-07-01 PROCEDURE — 1036F TOBACCO NON-USER: CPT | Performed by: PHYSICIAN ASSISTANT

## 2025-07-01 PROCEDURE — 1111F DSCHRG MED/CURRENT MED MERGE: CPT | Performed by: PHYSICIAN ASSISTANT

## 2025-07-01 PROCEDURE — 1125F AMNT PAIN NOTED PAIN PRSNT: CPT | Performed by: PHYSICIAN ASSISTANT

## 2025-07-01 PROCEDURE — 3008F BODY MASS INDEX DOCD: CPT | Performed by: PHYSICIAN ASSISTANT

## 2025-07-01 PROCEDURE — 99213 OFFICE O/P EST LOW 20 MIN: CPT | Mod: 25 | Performed by: PHYSICIAN ASSISTANT

## 2025-07-01 PROCEDURE — 99213 OFFICE O/P EST LOW 20 MIN: CPT | Performed by: PHYSICIAN ASSISTANT

## 2025-07-01 PROCEDURE — 71046 X-RAY EXAM CHEST 2 VIEWS: CPT

## 2025-07-01 ASSESSMENT — LIFESTYLE VARIABLES
HOW OFTEN DO YOU HAVE A DRINK CONTAINING ALCOHOL: 2-3 TIMES A WEEK
HOW MANY STANDARD DRINKS CONTAINING ALCOHOL DO YOU HAVE ON A TYPICAL DAY: 3 OR 4
HOW OFTEN DO YOU HAVE SIX OR MORE DRINKS ON ONE OCCASION: NEVER
SKIP TO QUESTIONS 9-10: 0
AUDIT-C TOTAL SCORE: 4

## 2025-07-01 ASSESSMENT — ENCOUNTER SYMPTOMS
OCCASIONAL FEELINGS OF UNSTEADINESS: 0
DEPRESSION: 0
LOSS OF SENSATION IN FEET: 0

## 2025-07-01 ASSESSMENT — PAIN SCALES - GENERAL: PAINLEVEL_OUTOF10: 2

## 2025-07-01 NOTE — PROGRESS NOTES
Chest tube continues to drain fills 2-3 times daily with daily dressing changes. States color started from red and has advanced to light orange.

## 2025-07-01 NOTE — PROGRESS NOTES
CARDIOTHORACIC SURGERY FOLLOW-UP PROGRESS NOTE    Subjective   Patient is a 65 y.o. male who presents for routine post-operative follow up. He comes in today complaining of nothing. His activity level has been good.       Objective   Physical Exam  There were no vitals taken for this visit.  Heart: regular rate and rhythm, no murmurs  Lungs: clear to auscultation bilaterally  Extremities: warm and well-perfused, peripheral pulses intact, no cyanosis, clubbing, or edema  Incision(s): Left chest tube in place,serous drainage around CT insertion. Wound with out signs of infection.    Data Review:    X-ray: Apical and basilar pneumothorax with sub q air.    Assessment/Plan   There are no diagnoses linked to this encounter.  Patient is S/P Robotic assisted LLL with MLND on 6/23/25. His post op course was significant for a persistent air leak . He was discharged with a left CT in place. CXR today shows enlarged basilar and apical pneumothorax with continued air leak.  placed a  u stitch around CT to help prevent air leak. His final pathology is pending. We will keep CT in place.   We will follow up in one week with CXR.  Jen Sánchez PA-C

## 2025-07-02 ENCOUNTER — TELEPHONE (OUTPATIENT)
Dept: CARDIOTHORACIC SURGERY | Facility: HOSPITAL | Age: 65
End: 2025-07-02
Payer: COMMERCIAL

## 2025-07-02 ENCOUNTER — TELEPHONE (OUTPATIENT)
Dept: CARDIAC SURGERY | Facility: CLINIC | Age: 65
End: 2025-07-02
Payer: COMMERCIAL

## 2025-07-02 ENCOUNTER — HOSPITAL ENCOUNTER (OUTPATIENT)
Dept: RADIOLOGY | Facility: HOSPITAL | Age: 65
Discharge: HOME | End: 2025-07-02
Payer: COMMERCIAL

## 2025-07-02 DIAGNOSIS — Z09 S/P LUNG SURGERY, FOLLOW-UP EXAM: ICD-10-CM

## 2025-07-02 PROCEDURE — 71046 X-RAY EXAM CHEST 2 VIEWS: CPT

## 2025-07-02 PROCEDURE — 71046 X-RAY EXAM CHEST 2 VIEWS: CPT | Performed by: RADIOLOGY

## 2025-07-02 NOTE — TELEPHONE ENCOUNTER
Aminata wife of patient and patient call c/o discomfort at chest tube site. States Brandon constantly leaking through tube, needs to drain every two hours at least. States none of his medication helps with this newly started pain. Pain began last night after draining the container. Changing every 2 hours. Its clear drainage and each time she dumps it there is 50-60 ml. If she does not drain then it leaks. Pain started after using a large syringe to draw. He is better draining with a smaller syringe. Describes pain at site of tube and beneath shoulder blade. Describes feeling a sucking feeling inside chest when using syringe to empty container. CXR ordered per Jen PAC. Pt states will go to [facility] and get it now.

## 2025-07-02 NOTE — TELEPHONE ENCOUNTER
Patients wife called stating patient was experiencing more pain. Mandi when removing the fluid from the atrium. Sent patient for 2 view chest. Upon examination cxr reveals a marked improvement in the basilar pneumothorax and slight improvement in the apical portion. Called patient's wife with results. They will follow up next week for potential removal of CT.

## 2025-07-03 ENCOUNTER — APPOINTMENT (OUTPATIENT)
Dept: VASCULAR SURGERY | Facility: CLINIC | Age: 65
End: 2025-07-03
Payer: COMMERCIAL

## 2025-07-03 ENCOUNTER — APPOINTMENT (OUTPATIENT)
Dept: VASCULAR MEDICINE | Facility: CLINIC | Age: 65
End: 2025-07-03
Payer: COMMERCIAL

## 2025-07-04 LAB
LAB AP BLOCK FOR ADDITIONAL STUDIES: NORMAL
LABORATORY COMMENT REPORT: NORMAL
PATH REPORT.ADDENDUM SPEC: NORMAL
PATH REPORT.FINAL DX SPEC: NORMAL
PATH REPORT.GROSS SPEC: NORMAL
PATH REPORT.RELEVANT HX SPEC: NORMAL
PATH REPORT.TOTAL CANCER: NORMAL
PATHOLOGY SYNOPTIC REPORT: NORMAL

## 2025-07-08 ENCOUNTER — OFFICE VISIT (OUTPATIENT)
Facility: CLINIC | Age: 65
End: 2025-07-08
Payer: COMMERCIAL

## 2025-07-08 ENCOUNTER — HOSPITAL ENCOUNTER (OUTPATIENT)
Dept: RADIOLOGY | Facility: CLINIC | Age: 65
Discharge: HOME | End: 2025-07-08
Payer: COMMERCIAL

## 2025-07-08 VITALS
DIASTOLIC BLOOD PRESSURE: 69 MMHG | HEIGHT: 69 IN | HEART RATE: 93 BPM | BODY MASS INDEX: 22.69 KG/M2 | SYSTOLIC BLOOD PRESSURE: 111 MMHG | WEIGHT: 153.2 LBS | RESPIRATION RATE: 18 BRPM

## 2025-07-08 DIAGNOSIS — C34.32 MALIGNANT NEOPLASM OF LOWER LOBE OF LEFT LUNG (MULTI): Primary | ICD-10-CM

## 2025-07-08 DIAGNOSIS — Z09 S/P LUNG SURGERY, FOLLOW-UP EXAM: ICD-10-CM

## 2025-07-08 DIAGNOSIS — Z98.890 HISTORY OF LUNG SURGERY: ICD-10-CM

## 2025-07-08 PROCEDURE — 1126F AMNT PAIN NOTED NONE PRSNT: CPT | Performed by: THORACIC SURGERY (CARDIOTHORACIC VASCULAR SURGERY)

## 2025-07-08 PROCEDURE — 1111F DSCHRG MED/CURRENT MED MERGE: CPT | Performed by: THORACIC SURGERY (CARDIOTHORACIC VASCULAR SURGERY)

## 2025-07-08 PROCEDURE — 99211 OFF/OP EST MAY X REQ PHY/QHP: CPT | Mod: 25 | Performed by: THORACIC SURGERY (CARDIOTHORACIC VASCULAR SURGERY)

## 2025-07-08 PROCEDURE — 3008F BODY MASS INDEX DOCD: CPT | Performed by: THORACIC SURGERY (CARDIOTHORACIC VASCULAR SURGERY)

## 2025-07-08 PROCEDURE — 32552 REMOVE LUNG CATHETER: CPT | Performed by: THORACIC SURGERY (CARDIOTHORACIC VASCULAR SURGERY)

## 2025-07-08 PROCEDURE — 3078F DIAST BP <80 MM HG: CPT | Performed by: THORACIC SURGERY (CARDIOTHORACIC VASCULAR SURGERY)

## 2025-07-08 PROCEDURE — 71046 X-RAY EXAM CHEST 2 VIEWS: CPT

## 2025-07-08 PROCEDURE — 1159F MED LIST DOCD IN RCRD: CPT | Performed by: THORACIC SURGERY (CARDIOTHORACIC VASCULAR SURGERY)

## 2025-07-08 PROCEDURE — 71046 X-RAY EXAM CHEST 2 VIEWS: CPT | Performed by: STUDENT IN AN ORGANIZED HEALTH CARE EDUCATION/TRAINING PROGRAM

## 2025-07-08 PROCEDURE — 3074F SYST BP LT 130 MM HG: CPT | Performed by: THORACIC SURGERY (CARDIOTHORACIC VASCULAR SURGERY)

## 2025-07-08 ASSESSMENT — LIFESTYLE VARIABLES
HOW OFTEN DO YOU HAVE A DRINK CONTAINING ALCOHOL: NEVER
SKIP TO QUESTIONS 9-10: 1
AUDIT-C TOTAL SCORE: 0
HOW MANY STANDARD DRINKS CONTAINING ALCOHOL DO YOU HAVE ON A TYPICAL DAY: PATIENT DOES NOT DRINK
HOW OFTEN DO YOU HAVE SIX OR MORE DRINKS ON ONE OCCASION: NEVER

## 2025-07-08 ASSESSMENT — ENCOUNTER SYMPTOMS
DEPRESSION: 0
LOSS OF SENSATION IN FEET: 0

## 2025-07-08 ASSESSMENT — PAIN SCALES - GENERAL: PAINLEVEL_OUTOF10: 0-NO PAIN

## 2025-07-09 ASSESSMENT — ENCOUNTER SYMPTOMS
CHOKING: 0
DIARRHEA: 0
VOMITING: 0
COUGH: 0
DIAPHORESIS: 0
ABDOMINAL PAIN: 0
APPETITE CHANGE: 0
HEMATOLOGIC/LYMPHATIC NEGATIVE: 1
NEUROLOGICAL NEGATIVE: 1
ABDOMINAL DISTENTION: 0
PALPITATIONS: 0
UNEXPECTED WEIGHT CHANGE: 0
STRIDOR: 0
CHEST TIGHTNESS: 0
PSYCHIATRIC NEGATIVE: 1
ALLERGIC/IMMUNOLOGIC NEGATIVE: 1
WHEEZING: 0
MUSCULOSKELETAL NEGATIVE: 1
FATIGUE: 0
SHORTNESS OF BREATH: 0
EYES NEGATIVE: 1
CONSTIPATION: 0
FEVER: 0
NAUSEA: 0
CHILLS: 0
ENDOCRINE NEGATIVE: 1

## 2025-07-09 NOTE — PROGRESS NOTES
"Subjective   Patient ID: Iam Acevedo \"Ignacio" is a 65 y.o. male who presents for Post-op Visit.  HPI    65-year-old male with history of peripheral vascular disease who was found to have a left lower lobe nodule.  This nodule measures 3 x 2.6 cm.  PET scan showing SUV max of 8.9 with negative lymph nodes.  He underwent a biopsy that revealed an adenocarcinoma.  His last echo showed an EF of 65%.  He is a very active man who does not endorse any shortness of breath.  We had a discussion about the next steps.  Will obtain an MRI of the brain to complete his workup.  He will also obtain PFTs to assess his candidacy for surgery.  I do not foresee any contraindications for surgery on those 2.  We discussed the mechanics of a robotic assisted left lower lobe lobectomy with lymphadenectomy.  We discussed length of stay potential complications and expected recovery.  [place] [facility]    Update 7/8/2025  He has been doing well.  Chest tube seems to have no airleak anymore.  Chest x-ray showing adequate lung expansion.  Chest tube has been removed bedside without any complications.  Dressings applied.  Wounds are well-healed.  His pathology revealed invasive poorly differentiated adenocarcinoma of the left lower lobe.  All lymph nodes were negative for malignancy.  This is a pT2a N0.  We discussed next steps will include close follow-up with imaging every 6 months.  I will see him again in 6 months with a CT of the chest.    Review of Systems   Constitutional:  Negative for appetite change, chills, diaphoresis, fatigue, fever and unexpected weight change.   HENT: Negative.     Eyes: Negative.    Respiratory:  Negative for cough, choking, chest tightness, shortness of breath, wheezing and stridor.    Cardiovascular:  Negative for chest pain, palpitations and leg swelling.   Gastrointestinal:  Negative for abdominal distention, abdominal pain, constipation, diarrhea, nausea and vomiting.   Endocrine: " Negative.    Genitourinary: Negative.    Musculoskeletal: Negative.    Skin: Negative.    Allergic/Immunologic: Negative.    Neurological: Negative.    Hematological: Negative.    Psychiatric/Behavioral: Negative.     All other systems reviewed and are negative.      Objective   Physical Exam  Constitutional:       Appearance: Normal appearance.   HENT:      Head: Normocephalic and atraumatic.      Nose: Nose normal.      Mouth/Throat:      Mouth: Mucous membranes are moist.      Pharynx: Oropharynx is clear.   Eyes:      Extraocular Movements: Extraocular movements intact.      Conjunctiva/sclera: Conjunctivae normal.      Pupils: Pupils are equal, round, and reactive to light.   Cardiovascular:      Rate and Rhythm: Normal rate and regular rhythm.      Pulses: Normal pulses.      Heart sounds: Normal heart sounds.   Pulmonary:      Effort: Pulmonary effort is normal. No respiratory distress.      Breath sounds: Normal breath sounds. No stridor. No wheezing, rhonchi or rales.   Chest:      Chest wall: No tenderness.   Abdominal:      General: Abdomen is flat. Bowel sounds are normal.      Palpations: Abdomen is soft.   Musculoskeletal:         General: Normal range of motion.      Cervical back: Normal range of motion and neck supple.   Skin:     General: Skin is warm and dry.      Capillary Refill: Capillary refill takes less than 2 seconds.   Neurological:      General: No focal deficit present.      Mental Status: He is alert and oriented to person, place, and time.         Assessment/Plan   Diagnoses and all orders for this visit:  Malignant neoplasm of lower lobe of left lung (Multi)          - Follow-up in 6 months with a CT of the chest.         Edmundo Lambert MD 07/09/25 3:11 PM

## 2025-07-10 ENCOUNTER — APPOINTMENT (OUTPATIENT)
Dept: VASCULAR SURGERY | Facility: CLINIC | Age: 65
End: 2025-07-10
Payer: COMMERCIAL

## 2025-07-10 ENCOUNTER — HOSPITAL ENCOUNTER (OUTPATIENT)
Dept: VASCULAR MEDICINE | Facility: CLINIC | Age: 65
Discharge: HOME | End: 2025-07-10
Payer: COMMERCIAL

## 2025-07-10 DIAGNOSIS — I73.9 PAD (PERIPHERAL ARTERY DISEASE): ICD-10-CM

## 2025-07-10 DIAGNOSIS — I70.213 ATHEROSCLEROSIS OF NATIVE ARTERIES OF EXTREMITIES WITH INTERMITTENT CLAUDICATION, BILATERAL LEGS: ICD-10-CM

## 2025-07-10 PROCEDURE — 93922 UPR/L XTREMITY ART 2 LEVELS: CPT

## 2025-07-10 PROCEDURE — 93922 UPR/L XTREMITY ART 2 LEVELS: CPT | Performed by: SURGERY

## 2025-07-29 ENCOUNTER — APPOINTMENT (OUTPATIENT)
Facility: CLINIC | Age: 65
End: 2025-07-29
Payer: COMMERCIAL

## 2025-09-05 DIAGNOSIS — I73.9 PAD (PERIPHERAL ARTERY DISEASE): Primary | ICD-10-CM

## 2025-09-11 ENCOUNTER — APPOINTMENT (OUTPATIENT)
Dept: VASCULAR SURGERY | Facility: CLINIC | Age: 65
End: 2025-09-11
Payer: COMMERCIAL

## 2025-10-02 ENCOUNTER — APPOINTMENT (OUTPATIENT)
Dept: VASCULAR SURGERY | Facility: CLINIC | Age: 65
End: 2025-10-02
Payer: COMMERCIAL

## (undated) DEVICE — COLLECTION UNIT, DRAINAGE, THORACIC, SINGLE TUBE, DRY SUCTION, ATS COMPATIBLE, OASIS 3600, LF

## (undated) DEVICE — TORQUE DEVICE, ACCOMODATES WIRES W/DIA .010 TO .038"."

## (undated) DEVICE — MANIFOLD, 4 PORT NEPTUNE STANDARD

## (undated) DEVICE — SHEATH, 45CM, W/DILATOR, 6 FR DIA, ST CURVE STYLE W/CROSS-CUT VALVE

## (undated) DEVICE — TUBING, SUCTION, CONNECTING, STERILE 0.25 X 120 IN., LF

## (undated) DEVICE — RELOAD, SUREFORM 45, 2.5 WHITE

## (undated) DEVICE — SUTURE, MONOCRYL, 4-0, 18 IN, PS2, UNDYED

## (undated) DEVICE — INSERT, CLAMP, SURGICAL, SOFT/TRACTION, STEALTH, 1 MM

## (undated) DEVICE — WOUND SYSTEM, DEBRIDEMENT & CLEANING, O.R DUOPAK

## (undated) DEVICE — GAUZE, KITTNER ROLL, STERILE

## (undated) DEVICE — SUTURE, PROLENE, 7-0, 24 IN, BV1, DA, BLUE

## (undated) DEVICE — SUTURE, VICRYL, 3-0, 27 IN, SH

## (undated) DEVICE — SPONGE, HEMOSTATIC, CELLULOSE, SURGICEL, NU-KNIT, 3 X 4 IN

## (undated) DEVICE — COVER, CART, 45 X 27 X 48 IN, CLEAR

## (undated) DEVICE — DRAPE, INCISE, ANTIMICROBIAL, IOBAN 2, LARGE, 17 X 23 IN, DISPOSABLE, STERILE

## (undated) DEVICE — TOWEL, SURGICAL, NEURO, O/R, 16 X 26, BLUE, STERILE

## (undated) DEVICE — Device

## (undated) DEVICE — TOWEL, OR XRAY, RFID DETECT, WHITE, 17X26, STERILE

## (undated) DEVICE — GUIDEWIRE, .035 X 180 BENTSON STR

## (undated) DEVICE — STRAP, CIRCUMFERENTIAL, 2 X 76""

## (undated) DEVICE — SUTURE, PROLENE, 6-0, 30 IN, BV-1 BV-1

## (undated) DEVICE — TUBE SET, INSUFFLATION, SMOKE EVAC, DAVINCI

## (undated) DEVICE — GOWN, ASTOUND, XL

## (undated) DEVICE — FORCEPS, CADIERE, DAVINCI XI

## (undated) DEVICE — PROTECTOR, NERVE, ULNAR, PINK

## (undated) DEVICE — KIT, ROBOTIC, CUSTOM UHC

## (undated) DEVICE — CATHETER, BALLOON, INPACT AV, DCB 018 6.0 MM X 120 MM X 130 CM

## (undated) DEVICE — ELECTRODE, ELECTROSURGICAL, BLADE, INSULATED, ENT/IMA, STERILE

## (undated) DEVICE — GUIDEWIRE, J-TIP, STARTER, 0.035 IN X 180 CM

## (undated) DEVICE — SUTURE, VICRYL, 2-0, 36 IN, CT-1, UNDYED

## (undated) DEVICE — STAPLER, SUREFORM 45, DAVINCI XI

## (undated) DEVICE — DRAPE, SHEET, FAN FOLDED, HALF, 44 X 58 IN, DISPOSABLE, LF, STERILE

## (undated) DEVICE — TRAY, MINOR, SINGLE BASIN, STERILE

## (undated) DEVICE — LOOP, VESSEL, MAXI, RED

## (undated) DEVICE — STRIP, SKIN CLOSURE, STERI STRIP, REINFORCED, 0.5 X 4 IN

## (undated) DEVICE — DRAPE, COLUMN, DAVINCI XI

## (undated) DEVICE — GUIDEWIRE, COMMAND ST, HI-TORQUE, 0.18 X 300CM

## (undated) DEVICE — ADHESIVE, SKIN, DERMABOND ADVANCED, 15CM, PEN-STYLE

## (undated) DEVICE — CATHETER, TEMPO, UNIV FLUSH, 4FR, 65CM

## (undated) DEVICE — DRAPE, PAD, PREP, W/ 9 IN CUFF, 24 X 41, LF, NS

## (undated) DEVICE — GLOVE, SURGICAL, PROTEXIS PI MICRO, 5.5, PF, LF

## (undated) DEVICE — DRESSING, ADHESIVE, ISLAND, TELFA, 2 X 3.75 IN, LF

## (undated) DEVICE — DEVICE, CLOSURE, PERCLOSE, PROSTYLE

## (undated) DEVICE — DRESSING, SPONGE, GAUZE, CURITY, 4 X 4 IN, STERILE

## (undated) DEVICE — BANDAGE, ELASTIC, MATRIX, SELF-CLOSURE, 4 IN X 5 YD, LF

## (undated) DEVICE — GRASPER, FENESTRATED TIP-UP XI

## (undated) DEVICE — INTRODUCER SET, MICROPUNCT, STIFF, 4FR 10CM,PLATINUM TIP,NITINOLWIRE

## (undated) DEVICE — DEVICE, INFLATION, ENCORE 20

## (undated) DEVICE — TROCAR, OPTICAL, BLADELESS, 12MM, THREADED, 100MM LENGTH

## (undated) DEVICE — SPONGE, HEMOSTATIC, GELATIN, SURGIFOAM, 8 X 12.5 CM X 10 MM

## (undated) DEVICE — COVER PROBE, SOFT FLEX W/ GEL, 5 X 48 IN (13X122CM)

## (undated) DEVICE — SUTURE, VICRYL, 2-0, 27 IN, BR/SH 27, VIOLET

## (undated) DEVICE — DRAPE, SHEET, ENDOSCOPY, GENERAL, FENESTRATED, ARMBOARD COVER, 98 X 123.5 IN, DISPOSABLE, LF, STERILE

## (undated) DEVICE — SHEATH, PINNACLE, 10 CM,  6FR INTRODUCER, 6FR DIA, 2.5 CM DIALATOR

## (undated) DEVICE — CATHETER, ARMADA 18PTA, 3.0MM X 150MM X 150CM

## (undated) DEVICE — GRASPER, LONG, BIPOLAR, DAVINCI XI

## (undated) DEVICE — GUIDEWIRE, ANGLE TIP,  .035 DIA, 260 CM, 3 CM TIP"

## (undated) DEVICE — OBTURATOR, BLADELESS , SU

## (undated) DEVICE — COVER, TABLE, 44 X 75 IN, DISPOSABLE, LF, STERILE

## (undated) DEVICE — RELOAD, SUREFORM 45, 4.6 BLACK

## (undated) DEVICE — GEL, ULTRASOUND, AQUASONIC 100, 20 GM, STERILE

## (undated) DEVICE — DRAPE, ARM XI

## (undated) DEVICE — DRESSING, TRANSPARENT, TEGADERM, 4 X 4-3/4 IN

## (undated) DEVICE — CATHETER TRAY, SURESTEP, 16FR, URINE METER W/STATLOCK

## (undated) DEVICE — BAG, TISSUE RETRIEVAL, 15MM, ANCHOR

## (undated) DEVICE — SUTURE, PROLENE, 5-0, 36 IN, C-1, CV-11, BLUE

## (undated) DEVICE — SUTURE, ETHIBOND, XTRA, 30 IN, 0, CT-1, GREEN

## (undated) DEVICE — GOWN, SURGICAL, SMARTGOWN, XLARGE, STERILE

## (undated) DEVICE — CATHETER, QUICKCROSS SUPPORT .035 X 90CM

## (undated) DEVICE — GUIDEWIRE, STIFF SHAFT, ANGLE TIP, .035 DIA, 260 CM,  3 CM TIP"

## (undated) DEVICE — COVER, PROBE, PULL UP ULTRASOUND KIT, 5 X 48

## (undated) DEVICE — SUTURE, VICRYL, 4-0, 18 IN, PS2, UNDYED

## (undated) DEVICE — CATHETER, THORACIC, STRAIGHT, ADULT, 28 FR, PVC

## (undated) DEVICE — SEAL, UNIVERSAL, 5-12MM

## (undated) DEVICE — DRAPE, MAGENTIC INSTRUMENT, 12X16

## (undated) DEVICE — DRESSING, ISLAND, ADHESIVE, TELFA, 4 X 8 IN